# Patient Record
Sex: MALE | Race: BLACK OR AFRICAN AMERICAN | Employment: OTHER | URBAN - METROPOLITAN AREA
[De-identification: names, ages, dates, MRNs, and addresses within clinical notes are randomized per-mention and may not be internally consistent; named-entity substitution may affect disease eponyms.]

---

## 2017-01-22 ENCOUNTER — HOSPITAL ENCOUNTER (EMERGENCY)
Age: 47
Discharge: HOME | End: 2017-01-22
Attending: EMERGENCY MEDICINE
Payer: SELF-PAY

## 2017-01-22 VITALS
HEIGHT: 75 IN | HEART RATE: 73 BPM | WEIGHT: 247.8 LBS | DIASTOLIC BLOOD PRESSURE: 121 MMHG | RESPIRATION RATE: 16 BRPM | SYSTOLIC BLOOD PRESSURE: 154 MMHG | TEMPERATURE: 98.2 F | OXYGEN SATURATION: 99 % | BODY MASS INDEX: 30.81 KG/M2

## 2017-01-22 DIAGNOSIS — M10.9 ACUTE GOUT OF LEFT ELBOW, UNSPECIFIED CAUSE: Primary | ICD-10-CM

## 2017-01-22 DIAGNOSIS — M25.522 LEFT ELBOW PAIN: ICD-10-CM

## 2017-01-22 PROCEDURE — 74011250637 HC RX REV CODE- 250/637: Performed by: PHYSICIAN ASSISTANT

## 2017-01-22 PROCEDURE — 99284 EMERGENCY DEPT VISIT MOD MDM: CPT

## 2017-01-22 PROCEDURE — 74011250636 HC RX REV CODE- 250/636: Performed by: PHYSICIAN ASSISTANT

## 2017-01-22 PROCEDURE — 93005 ELECTROCARDIOGRAM TRACING: CPT

## 2017-01-22 PROCEDURE — 96372 THER/PROPH/DIAG INJ SC/IM: CPT

## 2017-01-22 RX ORDER — COLCHICINE 0.6 MG/1
0.6 TABLET ORAL DAILY
Qty: 10 TAB | Refills: 0 | Status: SHIPPED | OUTPATIENT
Start: 2017-01-22 | End: 2017-02-01

## 2017-01-22 RX ORDER — OXYCODONE AND ACETAMINOPHEN 5; 325 MG/1; MG/1
1-2 TABLET ORAL
Qty: 20 TAB | Refills: 0 | Status: SHIPPED | OUTPATIENT
Start: 2017-01-22 | End: 2017-01-27

## 2017-01-22 RX ORDER — INDOMETHACIN 50 MG/1
50 CAPSULE ORAL 3 TIMES DAILY
Qty: 30 CAP | Refills: 0 | Status: SHIPPED | OUTPATIENT
Start: 2017-01-22 | End: 2017-02-01

## 2017-01-22 RX ORDER — OXYCODONE AND ACETAMINOPHEN 10; 325 MG/1; MG/1
1 TABLET ORAL
Status: COMPLETED | OUTPATIENT
Start: 2017-01-22 | End: 2017-01-22

## 2017-01-22 RX ORDER — COLCHICINE 0.6 MG/1
0.6 TABLET ORAL
Status: COMPLETED | OUTPATIENT
Start: 2017-01-22 | End: 2017-01-22

## 2017-01-22 RX ORDER — INDOMETHACIN 25 MG/1
50 CAPSULE ORAL
Status: COMPLETED | OUTPATIENT
Start: 2017-01-22 | End: 2017-01-22

## 2017-01-22 RX ADMIN — INDOMETHACIN 50 MG: 25 CAPSULE ORAL at 17:54

## 2017-01-22 RX ADMIN — OXYCODONE HYDROCHLORIDE AND ACETAMINOPHEN 1 TABLET: 10; 325 TABLET ORAL at 17:54

## 2017-01-22 RX ADMIN — METHYLPREDNISOLONE SODIUM SUCCINATE 125 MG: 125 INJECTION, POWDER, FOR SOLUTION INTRAMUSCULAR; INTRAVENOUS at 17:54

## 2017-01-22 RX ADMIN — COLCHICINE 0.6 MG: 0.6 TABLET, FILM COATED ORAL at 18:20

## 2017-01-22 NOTE — ED PROVIDER NOTES
HPI Comments: Santhosh Lehman is a 55 y.o. male with pertinent PMHx of HTN and gout presenting ambulatory to the ED c/o 10/10 throbbing left elbow pain, with associated swelling, x this morning. Pt states that the pain radiates up and down his left arm, but denies any radiation to his neck or jaw. Pt states that he has taken BCs with no relief. Pt states that his current symptoms feel like his past history of gout, which was likely flared by increased consumption of alcohol last night. Pt notes that he was recently seen and had blood work done by his PCP, who told him that his \"acid\" and cholesterol were elevated. Pt denies any allergies to medications. Pt specifically denies any fevers/chills, N/V/D, abdominal pain, SOB or chest pain. PCP: SEMAJ Wan 310 Hx: - tobacco use, + alcohol use (beer/daily), - illicit drug use    There are no other complaints, changes, or physical findings at this time. The history is provided by the patient. No  was used. Past Medical History:   Diagnosis Date    Hypertension     Other ill-defined conditions(799.89)      gout       Past Surgical History:   Procedure Laterality Date    Hx orthopaedic       right knee petella tendon repair    Hx heent       eyes         Family History:   Problem Relation Age of Onset    Diabetes Neg Hx        Social History     Social History    Marital status: SINGLE     Spouse name: N/A    Number of children: N/A    Years of education: N/A     Occupational History    Not on file. Social History Main Topics    Smoking status: Never Smoker    Smokeless tobacco: Never Used    Alcohol use 12.0 oz/week     24 Cans of beer per week      Comment: drinks 12 pack a day    Drug use: No    Sexual activity: Not on file     Other Topics Concern    Not on file     Social History Narrative         ALLERGIES: Review of patient's allergies indicates no known allergies.     Review of Systems Constitutional: Negative. Negative for chills and fever. HENT: Negative. Respiratory: Negative. Negative for cough and shortness of breath. Cardiovascular: Negative. Negative for chest pain. Gastrointestinal: Negative. Negative for abdominal pain, diarrhea, nausea and vomiting. Genitourinary: Negative. Musculoskeletal: Positive for arthralgias (left elbow) and joint swelling (left elbow). Skin: Negative. Negative for rash. Neurological: Negative. Psychiatric/Behavioral: Negative. All other systems reviewed and are negative. Vitals:    01/22/17 1702 01/22/17 1833   BP: (!) 170/115 (!) 154/121   Pulse: 75 73   Resp: 18 16   Temp: 98.2 °F (36.8 °C)    SpO2: 100% 99%   Weight: 112.4 kg (247 lb 12.8 oz)    Height: 6' 3\" (1.905 m)             Physical Exam   Constitutional: He is oriented to person, place, and time. He appears well-developed and well-nourished. No distress. 54 yo -American male   HENT:   Head: Normocephalic and atraumatic. Eyes: Conjunctivae are normal. Right eye exhibits no discharge. Left eye exhibits no discharge. Neck: Normal range of motion. Neck supple. Cardiovascular: Normal rate, regular rhythm, normal heart sounds and intact distal pulses. No murmur heard. Pulmonary/Chest: Effort normal and breath sounds normal. No respiratory distress. He has no wheezes. Musculoskeletal:   L ELBOW: + swelling and marked TTP. ROM limited with pain. FROM of the wrist and fingers. Distal NV intact. Cap refill < 2 sec. Ambulatory. Neurological: He is alert and oriented to person, place, and time. Skin: Skin is warm and dry. He is not diaphoretic. Psychiatric: He has a normal mood and affect. His behavior is normal.   Nursing note and vitals reviewed.        MDM  Number of Diagnoses or Management Options  Diagnosis management comments: DDx: fracture, sprain, strain, bursitis, gout       Amount and/or Complexity of Data Reviewed  Tests in the medicine section of CPT®: ordered and reviewed  Review and summarize past medical records: yes  Independent visualization of images, tracings, or specimens: yes    Patient Progress  Patient progress: stable    ED Course       Procedures  5:35 PM    reviewed. Pt has only filled 3 controlled substance Rx in the past 12 months. LABORATORY TESTS:  Recent Results (from the past 12 hour(s))   EKG, 12 LEAD, INITIAL    Collection Time: 01/22/17  5:11 PM   Result Value Ref Range    Ventricular Rate 67 BPM    Atrial Rate 67 BPM    P-R Interval 184 ms    QRS Duration 94 ms    Q-T Interval 400 ms    QTC Calculation (Bezet) 422 ms    Calculated P Axis 0 degrees    Calculated R Axis -4 degrees    Calculated T Axis -15 degrees    Diagnosis       Normal sinus rhythm  Nonspecific T wave abnormality  Abnormal ECG  When compared with ECG of 02-MAR-2016 01:40,  Vent. rate has decreased BY  35 BPM  Nonspecific T wave abnormality now evident in Anterolateral leads  QT has shortened         MEDICATIONS GIVEN:  Medications   methylPREDNISolone (PF) (SOLU-MEDROL) injection 125 mg (125 mg IntraMUSCular Given 1/22/17 1754)   oxyCODONE-acetaminophen (PERCOCET 10)  mg per tablet 1 Tab (1 Tab Oral Given 1/22/17 1754)   indomethacin (INDOCIN) capsule 50 mg (50 mg Oral Given 1/22/17 1754)   colchicine tablet 0.6 mg (0.6 mg Oral Given 1/22/17 1820)       IMPRESSION:  1. Acute gout of left elbow, unspecified cause    2. Left elbow pain    3. Elevated blood pressure        PLAN:  1. Current Discharge Medication List      START taking these medications    Details   indomethacin (INDOCIN) 50 mg capsule Take 1 Cap by mouth three (3) times daily for 10 days. With food  Qty: 30 Cap, Refills: 0      colchicine 0.6 mg tablet Take 1 Tab by mouth daily for 10 days. Qty: 10 Tab, Refills: 0      oxyCODONE-acetaminophen (PERCOCET) 5-325 mg per tablet Take 1-2 Tabs by mouth every six (6) hours as needed for Pain for up to 5 days.  Max Daily Amount: 8 Tabs.  Qty: 20 Tab, Refills: 0         CONTINUE these medications which have NOT CHANGED    Details   lisinopril (PRINIVIL, ZESTRIL) 10 mg tablet Take 1 Tab by mouth daily. Qty: 14 Tab, Refills: 0      atenolol (TENORMIN) 25 mg tablet TAKE ONE TABLET BY MOUTH ONCE DAILY  Qty: 14 Tab, Refills: 5           2. Follow-up Information     Follow up With Details Comments [address] In 1 week  SANCHEZ Mckeon [address] [id number]  [phone]        Return to ED if worse   DISCHARGE NOTE:  6:37 PM  The patient is ready for discharge. The patient's signs, symptoms, diagnosis, and discharge instructions have been discussed and the patient and/or family has conveyed their understanding. The patient and/or family is to follow up as recommended or return to the ER should their symptoms worsen. Plan has been discussed and the patient and/or family is in agreement. Written by Arnold Hayden, ED Scribe, as dictated by HANK [facility]: This note is prepared by Akin Field. Samanta Hayden, acting as Scribe for HANK Valle: The scribe's documentation has been prepared under my direction and personally reviewed by me in its entirety. I confirm that the note above accurately reflects all work, treatment, procedures, and medical decision making performed by me.

## 2017-01-22 NOTE — DISCHARGE INSTRUCTIONS
Gout: Care Instructions  Your Care Instructions  Gout is a form of arthritis caused by a buildup of uric acid crystals in a joint. It causes sudden attacks of pain, swelling, redness, and stiffness, usually in one joint, especially the big toe. Gout usually comes on without a cause. But it can be brought on by drinking alcohol (especially beer) or eating seafood and red meat. Taking certain medicines, such as diuretics or aspirin, also can bring on an attack of gout. Taking your medicines as prescribed and following up with your doctor regularly can help you avoid gout attacks in the future. Follow-up care is a key part of your treatment and safety. Be sure to make and go to all appointments, and call your doctor if you are having problems. Its also a good idea to know your test results and keep a list of the medicines you take. How can you care for yourself at home? · If the joint is swollen, put ice or a cold pack on the area for 10 to 20 minutes at a time. Put a thin cloth between the ice and your skin. · Prop up the sore limb on a pillow when you ice it or anytime you sit or lie down during the next 3 days. Try to keep it above the level of your heart. This will help reduce swelling. · Rest sore joints. Avoid activities that put weight or strain on the joints for a few days. Take short rest breaks from your regular activities during the day. · Take your medicines exactly as prescribed. Call your doctor if you think you are having a problem with your medicine. · Take pain medicines exactly as directed. ¨ If the doctor gave you a prescription medicine for pain, take it as prescribed. ¨ If you are not taking a prescription pain medicine, ask your doctor if you can take an over-the-counter medicine. · Eat less seafood and red meat. · Check with your doctor before drinking alcohol. · Losing weight, if you are overweight, may help reduce attacks of gout. But do not go on a [employer]. \" Losing a lot of weight in a short amount of time can cause a gout attack. When should you call for help? Call your doctor now or seek immediate medical care if:  · You have a fever. · The joint is so painful you cannot use it. · You have sudden, unexplained swelling, redness, warmth, or severe pain in one or more joints. Watch closely for changes in your health, and be sure to contact your doctor if:  · You have joint pain. · Your symptoms get worse or are not improving after 2 or 3 days. Where can you learn more? Go to http://komal-kameron.info/. Enter Q780 in the search box to learn more about \"Gout: Care Instructions. \"  Current as of: February 24, 2016  Content Version: 11.1  © 5995-4707 [employer]. Care instructions adapted under license by [employer] (which disclaims liability or warranty for this information). If you have questions about a medical condition or this instruction, always ask your healthcare professional. [place] [address] any warranty or liability for your use of this information. High Blood Pressure: Care Instructions  Your Care Instructions  If your blood pressure is usually above 140/90, you have high blood pressure, or hypertension. That means the top number is 140 or higher or the bottom number is 90 or higher, or both. Despite what a lot of people think, high blood pressure usually doesn't cause headaches or make you feel dizzy or lightheaded. It usually has no symptoms. But it does increase your risk for heart attack, stroke, and kidney or eye damage. The higher your blood pressure, the more your risk increases. Your doctor will give you a goal for your blood pressure. Your goal will be based on your health and your age. An example of a goal is to keep your blood pressure below 140/90. Lifestyle changes, such as eating healthy and being active, are always important to help lower blood pressure.  You might also take medicine to reach your blood pressure goal.  Follow-up care is a key part of your treatment and safety. Be sure to make and go to all appointments, and call your doctor if you are having problems. It's also a good idea to know your test results and keep a list of the medicines you take. How can you care for yourself at home? Medical treatment  · If you stop taking your medicine, your blood pressure will go back up. You may take one or more types of medicine to lower your blood pressure. Be safe with medicines. Take your medicine exactly as prescribed. Call your doctor if you think you are having a problem with your medicine. · Talk to your doctor before you start taking aspirin every day. Aspirin can help certain people lower their risk of a heart attack or stroke. But taking aspirin isn't right for everyone, because it can cause serious bleeding. · See your doctor regularly. You may need to see the doctor more often at first or until your blood pressure comes down. · If you are taking blood pressure medicine, talk to your doctor before you take decongestants or anti-inflammatory medicine, such as ibuprofen. Some of these medicines can raise blood pressure. · Learn how to check your blood pressure at home. Lifestyle changes  · Stay at a healthy weight. This is especially important if you put on weight around the waist. Losing even 10 pounds can help you lower your blood pressure. · If your doctor recommends it, get more exercise. Walking is a good choice. Bit by bit, increase the amount you walk every day. Try for at least 30 minutes on most days of the week. You also may want to swim, bike, or do other activities. · Avoid or limit alcohol. Talk to your doctor about whether you can drink any alcohol. · Try to limit how much sodium you eat to less than 2,300 milligrams (mg) a day. Your doctor may ask you to try to eat less than 1,500 mg a day.   · Eat plenty of fruits (such as bananas and oranges), vegetables, legumes, whole grains, and low-fat dairy products. · Lower the amount of saturated fat in your diet. Saturated fat is found in animal products such as milk, cheese, and meat. Limiting these foods may help you lose weight and also lower your risk for heart disease. · Do not smoke. Smoking increases your risk for heart attack and stroke. If you need help quitting, talk to your doctor about stop-smoking programs and medicines. These can increase your chances of quitting for good. When should you call for help? Call 911 anytime you think you may need emergency care. This may mean having symptoms that suggest that your blood pressure is causing a serious heart or blood vessel problem. Your blood pressure may be over 180/110. For example, call 911 if:  · You have symptoms of a heart attack. These may include:  ¨ Chest pain or pressure, or a strange feeling in the chest.  ¨ Sweating. ¨ Shortness of breath. ¨ Nausea or vomiting. ¨ Pain, pressure, or a strange feeling in the back, neck, jaw, or upper belly or in one or both shoulders or arms. ¨ Lightheadedness or sudden weakness. ¨ A fast or irregular heartbeat. · You have symptoms of a stroke. These may include:  ¨ Sudden numbness, tingling, weakness, or loss of movement in your face, arm, or leg, especially on only one side of your body. ¨ Sudden vision changes. ¨ Sudden trouble speaking. ¨ Sudden confusion or trouble understanding simple statements. ¨ Sudden problems with walking or balance. ¨ A sudden, severe headache that is different from past headaches. · You have severe back or belly pain. Do not wait until your blood pressure comes down on its own. Get help right away. Call your doctor now or seek immediate care if:  · Your blood pressure is much higher than normal (such as 180/110 or higher), but you don't have symptoms. · You think high blood pressure is causing symptoms, such as:  ¨ Severe headache. ¨ Blurry vision.   Watch closely for changes in your health, and be sure to contact your doctor if:  · Your blood pressure measures 140/90 or higher at least 2 times. That means the top number is 140 or higher or the bottom number is 90 or higher, or both. · You think you may be having side effects from your blood pressure medicine. · Your blood pressure is usually normal, but it goes above normal at least 2 times. Where can you learn more? Go to http://komal-kameron.info/. Enter D964 in the search box to learn more about \"High Blood Pressure: Care Instructions. \"  Current as of: August 8, 2016  Content Version: 11.1  © 3998-2609 [employer]. Care instructions adapted under license by [employer] (which disclaims liability or warranty for this information). If you have questions about a medical condition or this instruction, always ask your healthcare professional. [address] any warranty or liability for your use of this information.

## 2017-01-22 NOTE — ED NOTES
Speedy STRICKLAND has reviewed discharge instructions with the patient. The patient verbalized understanding. Pt discharged with written instructions. No further concerns at this time.

## 2017-01-23 LAB
ATRIAL RATE: 67 BPM
CALCULATED P AXIS, ECG09: 0 DEGREES
CALCULATED R AXIS, ECG10: -4 DEGREES
CALCULATED T AXIS, ECG11: -15 DEGREES
DIAGNOSIS, 93000: NORMAL
P-R INTERVAL, ECG05: 184 MS
Q-T INTERVAL, ECG07: 400 MS
QRS DURATION, ECG06: 94 MS
QTC CALCULATION (BEZET), ECG08: 422 MS
VENTRICULAR RATE, ECG03: 67 BPM

## 2017-04-02 ENCOUNTER — HOSPITAL ENCOUNTER (EMERGENCY)
Age: 47
Discharge: HOME | End: 2017-04-02
Attending: EMERGENCY MEDICINE | Admitting: EMERGENCY MEDICINE
Payer: SELF-PAY

## 2017-04-02 VITALS
OXYGEN SATURATION: 100 % | HEIGHT: 73 IN | WEIGHT: 251.32 LBS | BODY MASS INDEX: 33.31 KG/M2 | RESPIRATION RATE: 22 BRPM | HEART RATE: 82 BPM | SYSTOLIC BLOOD PRESSURE: 174 MMHG | TEMPERATURE: 97.8 F | DIASTOLIC BLOOD PRESSURE: 114 MMHG

## 2017-04-02 DIAGNOSIS — Z76.0 MEDICATION REFILL: ICD-10-CM

## 2017-04-02 DIAGNOSIS — I10 ESSENTIAL HYPERTENSION: ICD-10-CM

## 2017-04-02 DIAGNOSIS — M10.9 ACUTE GOUT OF LEFT ELBOW, UNSPECIFIED CAUSE: Primary | ICD-10-CM

## 2017-04-02 PROCEDURE — 74011250636 HC RX REV CODE- 250/636: Performed by: PHYSICIAN ASSISTANT

## 2017-04-02 PROCEDURE — 99283 EMERGENCY DEPT VISIT LOW MDM: CPT

## 2017-04-02 PROCEDURE — 96372 THER/PROPH/DIAG INJ SC/IM: CPT

## 2017-04-02 PROCEDURE — 74011250637 HC RX REV CODE- 250/637: Performed by: PHYSICIAN ASSISTANT

## 2017-04-02 RX ORDER — ATENOLOL 25 MG/1
25 TABLET ORAL DAILY
COMMUNITY
End: 2017-07-11

## 2017-04-02 RX ORDER — LISINOPRIL 10 MG/1
10 TABLET ORAL DAILY
Qty: 14 TAB | Refills: 0 | Status: SHIPPED | OUTPATIENT
Start: 2017-04-02 | End: 2017-04-16

## 2017-04-02 RX ORDER — INDOMETHACIN 25 MG/1
25 CAPSULE ORAL 3 TIMES DAILY
Qty: 30 CAP | Refills: 0 | Status: SHIPPED | OUTPATIENT
Start: 2017-04-02 | End: 2017-04-12

## 2017-04-02 RX ORDER — METHYLPREDNISOLONE 4 MG/1
TABLET ORAL
Qty: 1 DOSE PACK | Refills: 0 | Status: SHIPPED | OUTPATIENT
Start: 2017-04-02 | End: 2017-05-04

## 2017-04-02 RX ORDER — OXYCODONE AND ACETAMINOPHEN 5; 325 MG/1; MG/1
2 TABLET ORAL
Status: COMPLETED | OUTPATIENT
Start: 2017-04-02 | End: 2017-04-02

## 2017-04-02 RX ORDER — LISINOPRIL 10 MG/1
10 TABLET ORAL DAILY
COMMUNITY
End: 2017-07-11

## 2017-04-02 RX ORDER — ATENOLOL 25 MG/1
25 TABLET ORAL DAILY
Qty: 14 TAB | Refills: 0 | Status: SHIPPED | OUTPATIENT
Start: 2017-04-02 | End: 2017-05-04

## 2017-04-02 RX ORDER — NAPROXEN 250 MG/1
500 TABLET ORAL
Status: COMPLETED | OUTPATIENT
Start: 2017-04-02 | End: 2017-04-02

## 2017-04-02 RX ORDER — OXYCODONE AND ACETAMINOPHEN 5; 325 MG/1; MG/1
1 TABLET ORAL
Qty: 15 TAB | Refills: 0 | Status: SHIPPED | OUTPATIENT
Start: 2017-04-02 | End: 2017-05-04

## 2017-04-02 RX ADMIN — NAPROXEN 500 MG: 250 TABLET ORAL at 23:14

## 2017-04-02 RX ADMIN — METHYLPREDNISOLONE SODIUM SUCCINATE 125 MG: 125 INJECTION, POWDER, FOR SOLUTION INTRAMUSCULAR; INTRAVENOUS at 23:14

## 2017-04-02 RX ADMIN — OXYCODONE HYDROCHLORIDE AND ACETAMINOPHEN 2 TABLET: 5; 325 TABLET ORAL at 23:14

## 2017-04-03 NOTE — DISCHARGE INSTRUCTIONS
Thank you for allowing us to provide you with care today. We hope we addressed all of your concerns and needs. We strive to provide excellent quality care in the Emergency Department. Please rate us as excellent, as anything less than excellent does not meet our expectations. If you feel that you have not received excellent quality care or timely care, please ask to speak to the nurse manager. Please choose us in the future for your continued health care needs. The exam and treatment you received in the Emergency Department were for an urgent problem and are not intended as complete care. It is important that you follow-up with a doctor, nurse practitioner, or  [id number] assistant to: (1) confirm your diagnosis, (2) re-evaluation of changes in your illness and treatment, and (3) for ongoing care. If your symptoms become worse or you do not improve as expected and you are unable to reach your usual health care provider, you should return to the Emergency Department. We are available 24 hours a day. Take this sheet with you when you go to your follow-up visit. If you have any problem arranging the follow-up visit, contact the Emergency Department immediately. Make an appointment with your Primary Care doctor for follow up of this visit. Return to the ER if you are unable to be seen in the time recommended on your discharge instructions.     ==========================    [facility] Departments     For adult and child immunizations, family planning, TB screening, STD testing and women's health services.    [facility]: [place] [phone]      [facility] 25   [address] [place] [address]   170 [facility]: [facility] 200 [facility] [phone]      [facility]          [address] [place] [address]     For primary care services, woman and child wellness, and some clinics providing specialty care. [facility]. 2525 [facility] [phone]   411 [facility] 200 [facility] 3614 [facility] [phone]   339 [facility] [address] [phone]   [address]  [employer] 1604 [facility] 5850  Community  [phone]   [address] [facility] [address] [address] [phone]   [facility] [address] [phone]   [facility] [place] 1051 [facility] [phone]   [facility]: [facility] 700 [address], ext [address] [place] [address], [address] [phone]     [address] [phone]   [facility] Outreach 5850 [facility]  [phone]   [employer]  [address], [address] ([email or web address]) [phone]         Sexual Health/Woman Wellness Clinics    For STD/HIV testing and treatment, pregnancy testing and services, men's health, birth control services, LGBT services, and hepatitis/HPV vaccine services. [employer] for [employer] 201 [address] [facility] 75 [facility] [address] [facility] 1579 600 NENA [address] [phone]   [facility] [address], 5th floor [phone]   Pregnancy [address] [facility] [address]  Patrick Hatch [phone]         [place] [address]67 [facility] 94 [facility]   [phone]   [place]   [phone]   Women, Infant and Children's Services: [address] [phone]       [address] [phone]   [facility] Crisis Intervention   [phone]   4801 \[facility]\""   [phone] 6125 [facility] Psychiatry     [phone]   [address] Crisis   [address] [facility] [address] [phone]     [facility] Primary [facility]  [facility] Family [facility]icians [phone]  MD Belen Álvarez MD Araceli Hanson, MD [facility] Doctors [phone]  [address], [facility]  MD Antony Lomeli MD Alinda Roux, MD [address] [facility] [phone]  [place] [address] [facility] [phone]  Divine Blanc, MD Rivas Valenzuela, MD Meena Llamas MD   [facility] [phone]  [facility] MZIDVE TK, MD Davonte Rasheed, MD Joyce Mcintosh, NP [address] [phone]  MD Surendra Claros, MD Vanesa Riojas, MD Pranav Camacho, MD Mark Ruiz, MD Lore Nino, MD Buddy Jewell MD   33 57 [facility]  Betty Johnston MD [facility] [phone]  Sienna Darnell, MD Domingo Wakefield, NP  Bonnie Lui, MD Tyson Davenport, MD Sheron Valadez, MD Lilian West, MD Sue Vega MD   8470 [facility] [phone]  MD Mackenzie Mercado, P  Royce Rubio, NP  Holli Trevizo, MD Emily Lama, MD Madeline Galo, MD Marva Archer MD [facility] [phone]  MD Apolinar Coronado MD Alecia Mylar, MD Jon Coke, MD Ana Jeronimo MD   [address] [phone]  MD Nii Benitez MD [place] [phone]  MD Dylan Lin MD Jacquelynn Matters, MD   [facility] Physicians [phone]  MD Angie Chavarria, MD Flory Ramirez, MD Lein Brown, MD Fabiola Nolan, NP  Oc Estes MD [address] [place] [address]   [phone]  Samanta Frost, MD Madeleine Phillip, MD Adelia Prsaad MD   2102 [facility] [phone]  Nidia Kwon, MD Misty Bender, KATHRYN Reyes, HANK Reyes, KATHRYN Yun, HANK Diaz, MD Simon Dickson, ROB Sifuentes, DO Miscellaneous:  Brett Pro [phone]

## 2017-04-03 NOTE — ED PROVIDER NOTES
HPI Comments: Aaron Murillo is a 55 y.o. male with significant PMHx of gout and hypertension, presents ambulatory  to the ED with c/o acute onset of worsening constant left elbow pain x this morning. Pt reports he has \"gout in my elbow\" and he normally goes to an ER when it flares up. He states that is in the process of getting a primary care and has an appointment scheduled for 4/30. Pt reports he is normally given a shot of prednisone, percocet, and indomethacin with relief. Pt requested for 2 weeks worth of blood pressure medication. Pt denies injury, currently having a PCP, nausea, vomiting, fever, or chills. PCP: None  Social Hx: -tobacco, + EtOH (14.4 oz per week)    There are no other complaints, changes or physical findings at this time. Written by Jose Antonio Magaña ED Scribe, as dictated by Elian Simental      The history is provided by the patient. Past Medical History:   Diagnosis Date    Gout     Hypertension        History reviewed. No pertinent surgical history. History reviewed. No pertinent family history. Social History     Social History    Marital status: SINGLE     Spouse name: N/A    Number of children: N/A    Years of education: N/A     Occupational History    Not on file. Social History Main Topics    Smoking status: Never Smoker    Smokeless tobacco: Not on file    Alcohol use 14.4 oz/week     24 Cans of beer per week    Drug use: No    Sexual activity: Not on file     Other Topics Concern    Not on file     Social History Narrative    No narrative on file         ALLERGIES: Review of patient's allergies indicates no known allergies. Review of Systems   Constitutional: Negative for chills, fatigue and fever. HENT: Negative for congestion, ear pain and rhinorrhea. Eyes: Negative for pain and redness. Respiratory: Negative for cough and wheezing. Cardiovascular: Negative for chest pain and palpitations.    Gastrointestinal: Negative for abdominal pain, nausea and vomiting. Genitourinary: Negative for dysuria, frequency and urgency. Musculoskeletal: Positive for arthralgias (left elbow). Negative for back pain, neck pain and neck stiffness. Skin: Negative for rash and wound. Neurological: Negative for weakness, light-headedness, numbness and headaches. All other systems reviewed and are negative. Vitals:    04/02/17 2205   BP: (!) 174/114   Pulse: 82   Resp: 22   Temp: 97.8 °F (36.6 °C)   SpO2: 100%   Weight: 114 kg (251 lb 5.2 oz)   Height: 6' 1\" (1.854 m)            Physical Exam   Constitutional: He is oriented to person, place, and time. He appears well-developed and well-nourished. Non-toxic appearance. No distress. HENT:   Head: Normocephalic and atraumatic. Head is without right periorbital erythema and without left periorbital erythema. Right Ear: External ear normal.   Left Ear: External ear normal.   Nose: Nose normal.   Mouth/Throat: Uvula is midline. No trismus in the jaw. Eyes: Conjunctivae and EOM are normal. Pupils are equal, round, and reactive to light. No scleral icterus. Neck: Normal range of motion and full passive range of motion without pain. Cardiovascular: Normal rate, regular rhythm and normal heart sounds. Pulmonary/Chest: Effort normal and breath sounds normal. No accessory muscle usage. No tachypnea. No respiratory distress. He has no decreased breath sounds. He has no wheezes. Abdominal: Soft. There is no tenderness. There is no rigidity and no guarding. Musculoskeletal: Normal range of motion. Left elbow: He exhibits swelling. LEFT ELBOW:  Mild swelling of olecranon, no break in skin or redness. Full active ROM with discomfort. Neurological: He is alert and oriented to person, place, and time. He is not disoriented. No cranial nerve deficit or sensory deficit. GCS eye subscore is 4. GCS verbal subscore is 5. GCS motor subscore is 6. Skin: Skin is intact. No rash noted. Psychiatric: He has a normal mood and affect. His speech is normal.   Nursing note and vitals reviewed. MDM  Number of Diagnoses or Management Options  Acute gout of left elbow, unspecified cause:   Essential hypertension:   Medication refill:   Diagnosis management comments:     DDx: gout flare, hypertension, medication refill     Patient Progress  Patient progress: stable    ED Course       Procedures      MEDICATIONS GIVEN:  Medications   methylPREDNISolone (PF) (SOLU-MEDROL) injection 125 mg (125 mg IntraMUSCular Given 4/2/17 2314)   oxyCODONE-acetaminophen (PERCOCET) 5-325 mg per tablet 2 Tab (2 Tabs Oral Given 4/2/17 2314)   naproxen (NAPROSYN) tablet 500 mg (500 mg Oral Given 4/2/17 2314)       IMPRESSION:  1. Acute gout of left elbow, unspecified cause    2. Essential hypertension    3. Medication refill        PLAN:  1. Discharge Medication List as of 4/2/2017 10:52 PM      START taking these medications    Details   !! atenolol (TENORMIN) 25 mg tablet Take 1 Tab by mouth daily. , Print, Disp-14 Tab, R-0      !! lisinopril (PRINIVIL) 10 mg tablet Take 1 Tab by mouth daily for 14 days. , Print, Disp-14 Tab, R-0      oxyCODONE-acetaminophen (PERCOCET) 5-325 mg per tablet Take 1 Tab by mouth every four (4) hours as needed for Pain. Max Daily Amount: 6 Tabs., Print, Disp-15 Tab, R-0      indomethacin (INDOCIN) 25 mg capsule Take 1 Cap by mouth three (3) times daily for 10 days. With food, Print, Disp-30 Cap, R-0      methylPREDNISolone (MEDROL, KANG,) 4 mg tablet Per Dose Pack instructions, Print, Disp-1 Dose Pack, R-0       !! - Potential duplicate medications found. Please discuss with provider. CONTINUE these medications which have NOT CHANGED    Details   !! atenolol (TENORMIN) 25 mg tablet Take 25 mg by mouth daily. , Historical Med      !! lisinopril (PRINIVIL, ZESTRIL) 10 mg tablet Take 10 mg by mouth daily. , Historical Med       !! - Potential duplicate medications found.  Please discuss with provider. 2.   Follow-up Information     Follow up With Details Comments [address] Schedule an appointment as soon as possible for a visit PRIMARY CARE: call tomorrow to schedule follow up [address] [id number] [phone]        Return to ED if worse     DISCHARGE NOTE  11:10 PM  The patient has been re-evaluated and is ready for discharge. Reviewed available results with patient. Counseled pt on diagnosis and care plan. Pt has expressed understanding, and all questions have been answered. Pt agrees with plan and agrees to F/U as recommended, or return to the ED if their sxs worsen. Discharge instructions have been provided and explained to the pt, along with reasons to return to the ED. Written by Cliff Henry, ED Scribe, as dictated by Nikhil Samuel. This note is prepared by Cliff Henry, acting as Scribe for Nikhil Samuel. HANK Love : The scribe's documentation has been prepared under my direction and personally reviewed by me in its entirety. I confirm that the note above accurately reflects all work, treatment, procedures, and medical decision making performed by me.

## 2017-04-03 NOTE — ED NOTES
Discharge instructions reviewed with pt and copy given along with RX by PERNELL Gutierrez. Patient ambulatory from ED accompanied by spouse in no sign of distress or discomfort.

## 2017-04-03 NOTE — ED NOTES
Patient reports Gabby Vogel is out of his atenolol 25 mg and lisinopril 10 mg prescription as well and needs a refill on his prescription because he has not taken his medication in about a week. \"

## 2017-04-03 NOTE — ED NOTES
Patient states \"I got the gout in my left elbow and I normally get a prednisone shot and a percocet for the pain and another pill that helps the pain. \" Patient reports left elbow pain started around noon today and is a 10/10. Patient denies all other complaints at this time.

## 2017-05-04 ENCOUNTER — APPOINTMENT (OUTPATIENT)
Dept: GENERAL RADIOLOGY | Age: 47
End: 2017-05-04
Attending: PHYSICIAN ASSISTANT
Payer: SELF-PAY

## 2017-05-04 ENCOUNTER — HOSPITAL ENCOUNTER (EMERGENCY)
Age: 47
Discharge: HOME | End: 2017-05-04
Attending: EMERGENCY MEDICINE
Payer: SELF-PAY

## 2017-05-04 VITALS
OXYGEN SATURATION: 99 % | TEMPERATURE: 97.7 F | SYSTOLIC BLOOD PRESSURE: 189 MMHG | HEIGHT: 75 IN | BODY MASS INDEX: 31.36 KG/M2 | DIASTOLIC BLOOD PRESSURE: 107 MMHG | WEIGHT: 252.21 LBS | RESPIRATION RATE: 18 BRPM | HEART RATE: 79 BPM

## 2017-05-04 DIAGNOSIS — I10 ESSENTIAL HYPERTENSION: ICD-10-CM

## 2017-05-04 DIAGNOSIS — M25.561 ACUTE PAIN OF RIGHT KNEE: Primary | ICD-10-CM

## 2017-05-04 PROCEDURE — 73562 X-RAY EXAM OF KNEE 3: CPT

## 2017-05-04 PROCEDURE — 99284 EMERGENCY DEPT VISIT MOD MDM: CPT

## 2017-05-04 PROCEDURE — L1830 KO IMMOB CANVAS LONG PRE OTS: HCPCS

## 2017-05-04 PROCEDURE — 74011250637 HC RX REV CODE- 250/637: Performed by: PHYSICIAN ASSISTANT

## 2017-05-04 RX ORDER — NAPROXEN 250 MG/1
500 TABLET ORAL
Status: COMPLETED | OUTPATIENT
Start: 2017-05-04 | End: 2017-05-04

## 2017-05-04 RX ORDER — OXYCODONE AND ACETAMINOPHEN 5; 325 MG/1; MG/1
1 TABLET ORAL
Qty: 10 TAB | Refills: 0 | Status: SHIPPED | OUTPATIENT
Start: 2017-05-04 | End: 2017-05-07

## 2017-05-04 RX ORDER — ATENOLOL 25 MG/1
25 TABLET ORAL DAILY
Qty: 60 TAB | Refills: 0 | Status: SHIPPED | OUTPATIENT
Start: 2017-05-04 | End: 2017-07-03

## 2017-05-04 RX ORDER — ACETAMINOPHEN 500 MG
1000 TABLET ORAL
Status: COMPLETED | OUTPATIENT
Start: 2017-05-04 | End: 2017-05-04

## 2017-05-04 RX ORDER — LISINOPRIL 10 MG/1
10 TABLET ORAL DAILY
Qty: 60 TAB | Refills: 0 | Status: SHIPPED | OUTPATIENT
Start: 2017-05-04 | End: 2017-07-03

## 2017-05-04 RX ORDER — NAPROXEN 500 MG/1
500 TABLET ORAL 2 TIMES DAILY WITH MEALS
Qty: 20 TAB | Refills: 0 | Status: SHIPPED | OUTPATIENT
Start: 2017-05-04 | End: 2017-05-14

## 2017-05-04 RX ORDER — LISINOPRIL 5 MG/1
10 TABLET ORAL
Status: COMPLETED | OUTPATIENT
Start: 2017-05-04 | End: 2017-05-04

## 2017-05-04 RX ORDER — ATENOLOL 25 MG/1
25 TABLET ORAL
Status: COMPLETED | OUTPATIENT
Start: 2017-05-04 | End: 2017-05-04

## 2017-05-04 RX ADMIN — ATENOLOL 25 MG: 25 TABLET ORAL at 11:37

## 2017-05-04 RX ADMIN — LISINOPRIL 10 MG: 5 TABLET ORAL at 11:24

## 2017-05-04 RX ADMIN — ACETAMINOPHEN 1000 MG: 500 TABLET ORAL at 10:32

## 2017-05-04 RX ADMIN — NAPROXEN 500 MG: 250 TABLET ORAL at 10:32

## 2017-05-04 NOTE — DISCHARGE INSTRUCTIONS
Thank you for allowing us to provide you with excellent care today. We hope we addressed all of your concerns and needs. We strive to provide excellent quality care in the Emergency Department. Please rate us as excellent, as anything less than excellent does not meet our expectations. If you feel that you have not received excellent quality care or timely care, please ask to speak to the nurse manager. Please choose us in the future for your continued health care needs. The exam and treatment you received in the Emergency Department were for an urgent problem and are not intended as complete care. It is important that you follow-up with a doctor, nurse practitioner, or physician assistant to:  (1) confirm your diagnosis,  (2) re-evaluation of changes in your illness and treatment, and  (3) for ongoing care. If your symptoms become worse or you do not improve as expected and you are unable to reach your usual health care provider, you should return to the Emergency Department. We are available 24 hours a day. Take this sheet with you when you go to your follow-up visit. If you have any problem arranging the follow-up visit, contact [address] [place] [address] 21 [phone])    Make an appointment with your Primary Care doctor for follow up of this visit. Return to the ER if you are unable to be seen in the time recommended on your discharge instructions. Joint Pain: Care Instructions  Your Care Instructions  Many people have small aches and pains from overuse or injury to muscles and joints. Joint injuries often happen during sports or recreation, work tasks, or projects around the home. An overuse injury can happen when you put too much stress on a joint or when you do an activity that stresses the joint over and over, such as using the computer or rowing a boat. You can take action at home to help your muscles and joints get better.  You should feel better in 1 to 2 weeks, but it can take 3 months or more to heal completely. Follow-up care is a key part of your treatment and safety. Be sure to make and go to all appointments, and call your doctor if you are having problems. It's also a good idea to know your test results and keep a list of the medicines you take. How can you care for yourself at home? · Do not put weight on the injured joint for at least a day or two. · For the first day or two after an injury, do not take hot showers or baths, and do not use hot packs. The heat could make swelling worse. · Put ice or a cold pack on the sore joint for 10 to 20 minutes at a time. Try to do this every 1 to 2 hours for the next 3 days (when you are awake) or until the swelling goes down. Put a thin cloth between the ice and your skin. · Wrap the injury in an elastic bandage. Do not wrap it too tightly because this can cause more swelling. · Prop up the sore joint on a pillow when you ice it or anytime you sit or lie down during the next 3 days. Try to keep it above the level of your heart. This will help reduce swelling. · Take an over-the-counter pain medicine, such as acetaminophen (Tylenol), ibuprofen (Advil, Motrin), or naproxen (Aleve). Read and follow all instructions on the label. · After 1 or 2 days of rest, begin moving the joint gently. While the joint is still healing, you can begin to exercise using activities that do not strain or hurt the painful joint. When should you call for help? Call your doctor now or seek immediate medical care if:  · You have signs of infection, such as:  ¨ Increased pain, swelling, warmth, and redness. ¨ Red streaks leading from the joint. ¨ A fever. Watch closely for changes in your health, and be sure to contact your doctor if:  · Your movement or symptoms are not getting better after 1 to 2 weeks of home treatment. Where can you learn more? Go to http://komal-kameron.info/.   Enter P205 in the search box to learn more about \"Joint Pain: Care Instructions. \"  Current as of: May 23, 2016  Content Version: 11.2  © 0727-3352 [employer], [facility]. Care instructions adapted under license by [employer] (which disclaims liability or warranty for this information). If you have questions about a medical condition or this instruction, always ask your healthcare professional. [place] [address] any warranty or liability for your use of this information.

## 2017-05-04 NOTE — LETTER
[address] 
\[facility]\"" EMERGENCY DEPT 
[address] [place] [address] [address] [id number] 
[phone] Work/School Note Date: 5/4/2017 To Whom It May concern: 
 
Sunny Josue was seen and treated today in the emergency room by the following provider(s): 
Attending Provider: Samia Tee. MD Brenda 
Physician Assistant: Jacqueline Singer PA-C. Sunny Josue may return to work on 7 May 2017. Sincerely, Jacqueline Singer PA-C

## 2017-05-04 NOTE — ED NOTES
Patient presents ambulatory to the ED with complaint of right knee swelling and pain after missing a step coming out of his truck yesterday. Patient denies falling or any other complaints. Patient reports surgery on right knee and injuring it in the past the same way about two years ago.

## 2017-05-04 NOTE — ED PROVIDER NOTES
The history is provided by the patient. No  was used. Maribel Harkins is a 55 y.o. 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 Patient did not fall, but rather he missed the bottom step and his right foot hit pavement hard causing a \"jamming\" type mechanism to right knee. Patient is able to bend right knee, but painfully. Patient continued to work the remainder of his shift and being in truck all day, getting into and out of truck frequently remainder of the day. As day progressed, the \"tightness\" in right knee worsened. Patient denies CP, SOB, h/a, left lower leg/calf pain or other specific c/o or concerns today. Patient took BC 0600 today without improvement. Patient with hx similar injury with same mechanism x 2 years ago; patient with hx right knee surgery/patellar tendon repair 1999 and again in 2004 due to hardware failure; both surgeries by Dr Tai Brock at [facility]. No crutches or knee immobilizer at home to use. PCP: None  Allergies: none  Social Hx: never tobacco, yes alcohol    There are no other complaints, changes or physical findings at this time. Past Medical History:   Diagnosis Date    Gout     Hypertension     Other ill-defined conditions     gout       Past Surgical History:   Procedure Laterality Date    HX HEENT      eyes    HX ORTHOPAEDIC      right knee petella tendon repair         Family History:   Problem Relation Age of Onset    Diabetes Neg Hx        Social History     Social History    Marital status: SINGLE     Spouse name: N/A    Number of children: N/A    Years of education: N/A     Occupational History    Not on file.      Social History Main Topics    Smoking status: Never Smoker    Smokeless tobacco: Never Used    Alcohol use 14.4 oz/week     24 Cans of beer per week      Comment: 12 a week    Drug use: No    Sexual activity: Not on file     Other Topics Concern    Not on file     Social History Narrative    ** Merged History Encounter **              ALLERGIES: Review of patient's allergies indicates no known allergies. Review of Systems   Constitutional: Negative for chills, diaphoresis and fever. HENT: Negative for rhinorrhea and sore throat. Eyes: Negative for pain. Respiratory: Negative for shortness of breath, wheezing and stridor. Cardiovascular: Negative for chest pain and leg swelling. Gastrointestinal: Negative for abdominal pain, diarrhea, nausea and vomiting. Genitourinary: Negative for difficulty urinating, dysuria, flank pain and hematuria. Musculoskeletal: Positive for arthralgias (right knee). Negative for back pain and neck stiffness. Right knee pain   Skin: Negative for rash and wound. Neurological: Negative for dizziness, seizures, syncope, weakness, light-headedness and headaches. Psychiatric/Behavioral: Negative for behavioral problems and confusion. All other systems reviewed and are negative. Vitals:    05/04/17 1004 05/04/17 1118   BP: (!) 170/103 (!) 189/107   Pulse: 79    Resp: 18    Temp: 97.7 °F (36.5 °C)    SpO2: 100% 99%   Weight: 114.4 kg (252 lb 3.3 oz)    Height: 6' 3\" (1.905 m)             Physical Exam   Constitutional: He is oriented to person, place, and time. He appears well-developed and well-nourished. No distress. 56 y/o AAM in no acute distress   HENT:   Head: Normocephalic and atraumatic. Right Ear: External ear normal.   Left Ear: External ear normal.   Nose: Nose normal.   Eyes: Conjunctivae and EOM are normal.   Neck: Normal range of motion. Neck supple. Cardiovascular: Normal rate. No murmur heard. Pulmonary/Chest: Effort normal.   Abdominal: Soft. Bowel sounds are normal. He exhibits no distension. There is no tenderness. There is no guarding. Musculoskeletal:        Right knee: He exhibits swelling and effusion.  He exhibits normal range of motion. Right knee: No erythema or bruising. Well healed midline surgical scar. Nontender to quadriceps or patellar tendon. Tenderness and edema to the medial knee. No patellar facet tenderness. No laxity to cruiciate or collateral ligaments. FROM of knee. NVI distally. Left knee: No erythema, edema, or bruising. Nontender to quadriceps tendon Nontender to patellar tendon or tibial tuberosity. No joint line tenderness laterally or medially. No patellar facet tenderness. No laxity to cruiciate or collateral ligaments. FROM and NVI distally   Neurological: He is alert and oriented to person, place, and time. Skin: Skin is warm and dry. No rash noted. He is not diaphoretic. Psychiatric: He has a normal mood and affect. His behavior is normal. Judgment normal.   Nursing note and vitals reviewed. MDM  Number of Diagnoses or Management Options  Acute pain of right knee:   Essential hypertension:   Diagnosis management comments: DDx: sprain, strain, contusion, joint effusion, fracture, hardware failure    Patient presents with knee pain since injury yesterday. Will get analgesics and xray to further evaluate. Amount and/or Complexity of Data Reviewed  Tests in the radiology section of CPT®: ordered and reviewed    Patient Progress  Patient progress: stable    ED Course       Procedures     Chief Complaint   Patient presents with    Knee Pain     Right knee pain after injured yesterday. 10:10 AM  The patients presenting problems have been discussed, and they are in agreement with the care plan formulated and outlined with them. I have encouraged them to ask questions as they arise throughout their visit.     MEDICATIONS GIVEN:  Medications   acetaminophen (TYLENOL) tablet 1,000 mg (1,000 mg Oral Given 5/4/17 1032)   naproxen (NAPROSYN) tablet 500 mg (500 mg Oral Given 5/4/17 1032)   lisinopril (PRINIVIL, ZESTRIL) tablet 10 mg (10 mg Oral Given 5/4/17 1124)   atenolol (TENORMIN) tablet 25 mg (25 mg Oral Given 5/4/17 1137)       LABS REVIEWED:  No results found for this or any previous visit (from the past 24 hour(s)). VITAL SIGNS:  Patient Vitals for the past 12 hrs:   Temp Pulse Resp BP SpO2   05/04/17 1118 - - - (!) 189/107 99 %   05/04/17 1004 97.7 °F (36.5 °C) 79 18 (!) 170/103 100 %       RADIOLOGY RESULTS:  The following have been ordered and reviewed:  XR KNEE RT 3 V   Final Result   INDICATION: pain/swelling     Exam: AP, lateral, oblique views of the right knee.     FINDINGS: There is no acute fracture-dislocation. There are small osteophytes  along the superior and inferior margins of the patella. Bones are  well-mineralized. Soft tissues are normal.     IMPRESSION  IMPRESSION: No acute fracture or dislocation. PROCEDURES:  Procedure Note - Splint Assessment: right knee immobilizer application  16:95 AM  Applied by ER nursing staff  Eval by EM Jacinto  Knee immobilizer applied to patient's right knee for stabilization/swelling control was evaluated by EM Jacinto. Immobilizer in good position. N/V intact after treatment. The procedure took 1-15  minutes, and patient tolerated well    PROGRESS NOTES:  10:10 AM  Initial assessment of patient complete, and patient was given the opportunity to ask questions. Plan of care reviewed with patient and will update when results are available. 11:04 AM  I have just reevaluated the patient. I have reviewed his vital signs and determined there is currently no worsening in their condition or physical exam. Results have been reviewed with them and their questions have been answered. Discussed with patient use of knee immobilizer and crutches. Patient also requesting note for work. Patient states that he has been out of his BP medications for the past week and is requesting refill of his BP medications until his appointment with his PCP on 6/16. HYPERTENSION COUNSELING  Patient denies chest pain, headache, shortness of breath.  Patient is made aware of their elevated blood pressure and is instructed to follow up this week with their Primary Care for a recheck. Patient is counseled regarding consequences of chronic, uncontrolled hypertension including kidney disease, heart disease, stroke or even death. Patient states their understanding and agrees to follow up this week. 11:37 AM  I have reviewed discharge instructions with the patient and explained medications that he is being discharged with. The patient verbalized understanding and agrees with plan. DIAGNOSIS:    1. Acute pain of right knee    2. Essential hypertension        PLAN:  1. Right knee immobilizer applied appropriately with crutches  2. Medications as directed  3. F/u with Orthopedic surgeon in 2-3 days  4. Return precautions reviewed  5. Discharge home    Follow-up Information     Follow up With Details Comments Contact Info    Your orthopedic surgeon Schedule an appointment as soon as possible for a visit      [facility] EMERGENCY DEPT  As needed, If symptoms worsen [address] [place] [address]  [phone]        Current Discharge Medication List      START taking these medications    Details   !! lisinopril (PRINIVIL) 10 mg tablet Take 1 Tab by mouth daily for 60 days. Qty: 60 Tab, Refills: 0      !! atenolol (TENORMIN) 25 mg tablet Take 1 Tab by mouth daily for 60 days. Qty: 60 Tab, Refills: 0      naproxen (NAPROSYN) 500 mg tablet Take 1 Tab by mouth two (2) times daily (with meals) for 10 days. Qty: 20 Tab, Refills: 0      oxyCODONE-acetaminophen (PERCOCET) 5-325 mg per tablet Take 1 Tab by mouth every six (6) hours as needed for Pain for up to 3 days. Max Daily Amount: 4 Tabs. Qty: 10 Tab, Refills: 0       !! - Potential duplicate medications found. Please discuss with provider. CONTINUE these medications which have NOT CHANGED    Details   !! atenolol (TENORMIN) 25 mg tablet Take 25 mg by mouth daily.       !! lisinopril (PRINIVIL, ZESTRIL) 10 mg tablet Take 10 mg by mouth daily. !! - Potential duplicate medications found. Please discuss with provider. ED COURSE: The patients hospital course has been uncomplicated. DISCHARGE NOTE:  11:36 AM  The care plan has been outline with the patient and/or family, who verbally conveyed understanding and agreement. Available results have been reviewed. Patient and/or family understand the follow up plan as outlined and discharge instructions. Should their condition deterioration at any time after discharge the patient agrees to return, follow up sooner than outlined or seek medical assistance at the closest Emergency Room as soon as possible. Questions have been answered. Discharge instructions and educational information regarding the patient's diagnosis as well a list of reasons why the patient would want to seek immediate medical attention, should their condition change, were reviewed directly with the patient/family     This note is prepared by Alton Reese, acting as Scribe for HANK Izquierdo PA-C: The scribe's documentation has been prepared under my direction and personally reviewed by me in its entirety. I confirm that the note above accurately reflects all work, treatment, procedures, and medical decision making performed by me.

## 2017-05-04 NOTE — ED NOTES
Shaquille Morrison reviewed discharge instructions with the patient. The patient verbalized understanding. EM Morrison at bedside to provide discharge paperwork. Vital signs stable. Pt in no apparent distress at this time. Mental status at baseline. Ambulatory to waiting room with steady gate, discharge paperwork in hand. Accompanied by self.

## 2017-05-04 NOTE — ED NOTES
Knee immobilizer applied to patient's right knee. Crutches fit to patient height. Patient comfortable. Patient educated on knee immobilizer and crutches. Patient verbalizes understanding.

## 2017-05-12 ENCOUNTER — TELEPHONE (OUTPATIENT)
Dept: FAMILY MEDICINE CLINIC | Age: 47
End: 2017-05-12

## 2017-06-02 ENCOUNTER — HOSPITAL ENCOUNTER (EMERGENCY)
Age: 47
Discharge: HOME | End: 2017-06-02
Attending: EMERGENCY MEDICINE
Payer: SELF-PAY

## 2017-06-02 ENCOUNTER — APPOINTMENT (OUTPATIENT)
Dept: GENERAL RADIOLOGY | Age: 47
End: 2017-06-02
Attending: EMERGENCY MEDICINE
Payer: SELF-PAY

## 2017-06-02 VITALS
HEART RATE: 91 BPM | OXYGEN SATURATION: 97 % | HEIGHT: 75 IN | BODY MASS INDEX: 31.85 KG/M2 | WEIGHT: 256.17 LBS | DIASTOLIC BLOOD PRESSURE: 94 MMHG | TEMPERATURE: 98.2 F | RESPIRATION RATE: 18 BRPM | SYSTOLIC BLOOD PRESSURE: 161 MMHG

## 2017-06-02 DIAGNOSIS — J06.9 ACUTE UPPER RESPIRATORY INFECTION: ICD-10-CM

## 2017-06-02 DIAGNOSIS — R05.9 COUGH: Primary | ICD-10-CM

## 2017-06-02 DIAGNOSIS — F14.91 HISTORY OF COCAINE USE: ICD-10-CM

## 2017-06-02 DIAGNOSIS — J01.00 ACUTE MAXILLARY SINUSITIS, RECURRENCE NOT SPECIFIED: ICD-10-CM

## 2017-06-02 LAB
ALBUMIN SERPL BCP-MCNC: 3.8 G/DL (ref 3.5–5)
ALBUMIN/GLOB SERPL: 1.1 {RATIO} (ref 1.1–2.2)
ALP SERPL-CCNC: 55 U/L (ref 45–117)
ALT SERPL-CCNC: 47 U/L (ref 12–78)
ANION GAP BLD CALC-SCNC: 8 MMOL/L (ref 5–15)
AST SERPL W P-5'-P-CCNC: 47 U/L (ref 15–37)
BASOPHILS # BLD AUTO: 0 K/UL (ref 0–0.1)
BASOPHILS # BLD: 1 % (ref 0–1)
BILIRUB SERPL-MCNC: 0.5 MG/DL (ref 0.2–1)
BUN SERPL-MCNC: 11 MG/DL (ref 6–20)
BUN/CREAT SERPL: 8 (ref 12–20)
CALCIUM SERPL-MCNC: 8.7 MG/DL (ref 8.5–10.1)
CHLORIDE SERPL-SCNC: 110 MMOL/L (ref 97–108)
CO2 SERPL-SCNC: 24 MMOL/L (ref 21–32)
CREAT SERPL-MCNC: 1.31 MG/DL (ref 0.7–1.3)
EOSINOPHIL # BLD: 0.3 K/UL (ref 0–0.4)
EOSINOPHIL NFR BLD: 6 % (ref 0–7)
ERYTHROCYTE [DISTWIDTH] IN BLOOD BY AUTOMATED COUNT: 14.3 % (ref 11.5–14.5)
GLOBULIN SER CALC-MCNC: 3.6 G/DL (ref 2–4)
GLUCOSE SERPL-MCNC: 109 MG/DL (ref 65–100)
HCT VFR BLD AUTO: 37.6 % (ref 36.6–50.3)
HGB BLD-MCNC: 12.1 G/DL (ref 12.1–17)
LACTATE SERPL-SCNC: 1.3 MMOL/L (ref 0.4–2)
LYMPHOCYTES # BLD AUTO: 20 % (ref 12–49)
LYMPHOCYTES # BLD: 1.1 K/UL (ref 0.8–3.5)
MCH RBC QN AUTO: 28.7 PG (ref 26–34)
MCHC RBC AUTO-ENTMCNC: 32.2 G/DL (ref 30–36.5)
MCV RBC AUTO: 89.1 FL (ref 80–99)
MONOCYTES # BLD: 0.5 K/UL (ref 0–1)
MONOCYTES NFR BLD AUTO: 9 % (ref 5–13)
NEUTS SEG # BLD: 3.7 K/UL (ref 1.8–8)
NEUTS SEG NFR BLD AUTO: 64 % (ref 32–75)
PLATELET # BLD AUTO: 140 K/UL (ref 150–400)
POTASSIUM SERPL-SCNC: 3.6 MMOL/L (ref 3.5–5.1)
PROT SERPL-MCNC: 7.4 G/DL (ref 6.4–8.2)
RBC # BLD AUTO: 4.22 M/UL (ref 4.1–5.7)
SODIUM SERPL-SCNC: 142 MMOL/L (ref 136–145)
WBC # BLD AUTO: 5.6 K/UL (ref 4.1–11.1)

## 2017-06-02 PROCEDURE — 85025 COMPLETE CBC W/AUTO DIFF WBC: CPT | Performed by: EMERGENCY MEDICINE

## 2017-06-02 PROCEDURE — 36415 COLL VENOUS BLD VENIPUNCTURE: CPT | Performed by: EMERGENCY MEDICINE

## 2017-06-02 PROCEDURE — 99283 EMERGENCY DEPT VISIT LOW MDM: CPT

## 2017-06-02 PROCEDURE — 74011000250 HC RX REV CODE- 250: Performed by: EMERGENCY MEDICINE

## 2017-06-02 PROCEDURE — 77030029684 HC NEB SM VOL KT MONA -A

## 2017-06-02 PROCEDURE — 71020 XR CHEST PA LAT: CPT

## 2017-06-02 PROCEDURE — 74011250637 HC RX REV CODE- 250/637: Performed by: EMERGENCY MEDICINE

## 2017-06-02 PROCEDURE — 74011250636 HC RX REV CODE- 250/636: Performed by: EMERGENCY MEDICINE

## 2017-06-02 PROCEDURE — 83605 ASSAY OF LACTIC ACID: CPT | Performed by: EMERGENCY MEDICINE

## 2017-06-02 PROCEDURE — 94640 AIRWAY INHALATION TREATMENT: CPT

## 2017-06-02 PROCEDURE — 96372 THER/PROPH/DIAG INJ SC/IM: CPT

## 2017-06-02 PROCEDURE — 80053 COMPREHEN METABOLIC PANEL: CPT | Performed by: EMERGENCY MEDICINE

## 2017-06-02 RX ORDER — CODEINE PHOSPHATE AND GUAIFENESIN 10; 100 MG/5ML; MG/5ML
10 SOLUTION ORAL
Status: COMPLETED | OUTPATIENT
Start: 2017-06-02 | End: 2017-06-02

## 2017-06-02 RX ORDER — IPRATROPIUM BROMIDE AND ALBUTEROL SULFATE 2.5; .5 MG/3ML; MG/3ML
3 SOLUTION RESPIRATORY (INHALATION) ONCE
Status: COMPLETED | OUTPATIENT
Start: 2017-06-02 | End: 2017-06-02

## 2017-06-02 RX ORDER — AMOXICILLIN AND CLAVULANATE POTASSIUM 875; 125 MG/1; MG/1
1 TABLET, FILM COATED ORAL 2 TIMES DAILY
Qty: 20 TAB | Refills: 0 | Status: SHIPPED | OUTPATIENT
Start: 2017-06-02 | End: 2017-06-12

## 2017-06-02 RX ORDER — AMOXICILLIN AND CLAVULANATE POTASSIUM 875; 125 MG/1; MG/1
1 TABLET, FILM COATED ORAL
Status: COMPLETED | OUTPATIENT
Start: 2017-06-02 | End: 2017-06-02

## 2017-06-02 RX ORDER — IBUPROFEN 600 MG/1
600 TABLET ORAL
Qty: 30 TAB | Refills: 0 | Status: SHIPPED | OUTPATIENT
Start: 2017-06-02 | End: 2018-02-08

## 2017-06-02 RX ORDER — KETOROLAC TROMETHAMINE 30 MG/ML
30 INJECTION, SOLUTION INTRAMUSCULAR; INTRAVENOUS
Status: COMPLETED | OUTPATIENT
Start: 2017-06-02 | End: 2017-06-02

## 2017-06-02 RX ADMIN — KETOROLAC TROMETHAMINE 30 MG: 30 INJECTION, SOLUTION INTRAMUSCULAR at 03:19

## 2017-06-02 RX ADMIN — GUAIFENESIN AND CODEINE PHOSPHATE 10 ML: 100; 10 SOLUTION ORAL at 02:38

## 2017-06-02 RX ADMIN — IPRATROPIUM BROMIDE AND ALBUTEROL SULFATE 3 ML: .5; 3 SOLUTION RESPIRATORY (INHALATION) at 03:19

## 2017-06-02 RX ADMIN — AMOXICILLIN AND CLAVULANATE POTASSIUM 1 TABLET: 875; 125 TABLET, FILM COATED ORAL at 03:56

## 2017-06-02 NOTE — ED NOTES
Dr. Gold Mar reviewed discharge instructions with the patient. The patient verbalized understanding.

## 2017-06-02 NOTE — DISCHARGE INSTRUCTIONS
Cough: Care Instructions  Your Care Instructions  A cough is your body's response to something that bothers your throat or airways. Many things can cause a cough. You might cough because of a cold or the flu, bronchitis, or asthma. Smoking, postnasal drip, allergies, and stomach acid that backs up into your throat also can cause coughs. A cough is a symptom, not a disease. Most coughs stop when the cause, such as a cold, goes away. You can take a few steps at home to cough less and feel better. Follow-up care is a key part of your treatment and safety. Be sure to make and go to all appointments, and call your doctor if you are having problems. It's also a good idea to know your test results and keep a list of the medicines you take. How can you care for yourself at home? · Drink lots of water and other fluids. This helps thin the mucus and soothes a dry or sore throat. Honey or lemon juice in hot water or tea may ease a dry cough. · Take cough medicine as directed by your doctor. · Prop up your head on pillows to help you breathe and ease a dry cough. · Try cough drops to soothe a dry or sore throat. Cough drops don't stop a cough. Medicine-flavored cough drops are no better than candy-flavored drops or hard candy. · Do not smoke. Avoid secondhand smoke. If you need help quitting, talk to your doctor about stop-smoking programs and medicines. These can increase your chances of quitting for good. When should you call for help? Call 911 anytime you think you may need emergency care. For example, call if:  · You have severe trouble breathing. Call your doctor now or seek immediate medical care if:  · You cough up blood. · You have new or worse trouble breathing. · You have a new or higher fever. · You have a new rash.   Watch closely for changes in your health, and be sure to contact your doctor if:  · You cough more deeply or more often, especially if you notice more mucus or a change in the color of your mucus. · You have new symptoms, such as a sore throat, an earache, or sinus pain. · You do not get better as expected. Where can you learn more? Go to http://komal-kameron.info/. Enter D279 in the search box to learn more about \"Cough: Care Instructions. \"  Current as of: May 27, 2016  Content Version: 11.2  © 1323-0646 [employer]. Care instructions adapted under license by [employer] (which disclaims liability or warranty for this information). If you have questions about a medical condition or this instruction, always ask your healthcare professional. [address] any warranty or liability for your use of this information.

## 2017-06-02 NOTE — ED PROVIDER NOTES
HPI Comments: Reynold Hines is a 55 y.o. Male, with a pertinent PMHx of HTN, who presents ambulatory to the ED c/o a progressively worsening dry cough for 3 days. Pt reports associated symptoms of chills, congestion, and generalized aching. Pt reports he is currently in a treatment facility for cocaine use. Pt denies a h/o asthma. Pt specifically denies a fever. Of note, pt reports he is compliant with taking his BP medication. Social hx: - Tobacco use, + EtOH use, - Illicit drug use    PCP: None    There are no other complaints, changes or physical findings at this time. The history is provided by the patient. No  was used. Past Medical History:   Diagnosis Date    Gout     Hypertension     Other ill-defined conditions     gout       Past Surgical History:   Procedure Laterality Date    HX HEENT      eyes    HX ORTHOPAEDIC      right knee petella tendon repair         Family History:   Problem Relation Age of Onset    Diabetes Neg Hx        Social History     Social History    Marital status: SINGLE     Spouse name: N/A    Number of children: N/A    Years of education: N/A     Occupational History    Not on file. Social History Main Topics    Smoking status: Never Smoker    Smokeless tobacco: Never Used    Alcohol use 14.4 oz/week     24 Cans of beer per week      Comment: 12 a week    Drug use: No    Sexual activity: Not on file     Other Topics Concern    Not on file     Social History Narrative    ** Merged History Encounter **              ALLERGIES: Review of patient's allergies indicates no known allergies. Review of Systems   Constitutional: Positive for chills. Negative for fever. HENT: Positive for congestion. Negative for rhinorrhea, sneezing and sore throat. Eyes: Negative. Negative for redness and visual disturbance. Respiratory: Positive for cough. Negative for shortness of breath and wheezing. Cardiovascular: Negative.   Negative for chest pain and leg swelling. Gastrointestinal: Negative. Negative for abdominal pain, diarrhea, nausea and vomiting. Genitourinary: Negative. Negative for difficulty urinating, discharge and frequency. Musculoskeletal: Positive for myalgias (diffuse). Negative for arthralgias, back pain and neck stiffness. Skin: Negative. Negative for color change and rash. Neurological: Negative. Negative for dizziness, syncope, weakness, numbness and headaches. Hematological: Negative for adenopathy. Psychiatric/Behavioral: Negative. All other systems reviewed and are negative. Vitals:    06/02/17 0159 06/02/17 0202   BP:  (!) 161/94   Pulse: 91    Resp: 18    Temp: 98.2 °F (36.8 °C)    SpO2: 96% 97%   Weight: 116.2 kg (256 lb 2.8 oz)    Height: 6' 3\" (1.905 m)             Physical Exam   Constitutional: He is oriented to person, place, and time. HENT:   Head: Atraumatic. Frontal and maxillary sinus tenderness, BL nasal congestion, intermittent paroxysm of dry cough   Eyes: EOM are normal.   Cardiovascular: Normal rate, regular rhythm, normal heart sounds and intact distal pulses. Exam reveals no gallop and no friction rub. No murmur heard. Pulmonary/Chest: Effort normal and breath sounds normal. No respiratory distress. He has no wheezes. He has no rales. He exhibits no tenderness. Lungs were clear   Abdominal: Soft. Bowel sounds are normal. He exhibits no distension and no mass. There is no tenderness. There is no rebound and no guarding. Musculoskeletal: Normal range of motion. He exhibits no edema or tenderness. Neurological: He is alert and oriented to person, place, and time. Psychiatric: He has a normal mood and affect. Nursing note and vitals reviewed.        MDM  Number of Diagnoses or Management Options  Diagnosis management comments: DDx: URI, bronchitis, low suspicion for PNA, sinusitis, in patient currently undergoing rehabilitation for cocaine addiction       Amount and/or Complexity of Data Reviewed  Clinical lab tests: ordered and reviewed  Tests in the radiology section of CPT®: ordered and reviewed  Review and summarize past medical records: yes  Independent visualization of images, tracings, or specimens: yes    Patient Progress  Patient progress: stable    ED Course       Procedures  PROGRESS NOTE:  3:38 AM  Pt has been re-evaluated. Pt states he feels an improvement with his cough after the treatment. Pt's vital signs remain stable up to the time of discharge. LABORATORY TESTS:  Recent Results (from the past 12 hour(s))   CBC WITH AUTOMATED DIFF    Collection Time: 06/02/17  2:12 AM   Result Value Ref Range    WBC 5.6 4.1 - 11.1 K/uL    RBC 4.22 4.10 - 5.70 M/uL    HGB 12.1 12.1 - 17.0 g/dL    HCT 37.6 36.6 - 50.3 %    MCV 89.1 80.0 - 99.0 FL    MCH 28.7 26.0 - 34.0 PG    MCHC 32.2 30.0 - 36.5 g/dL    RDW 14.3 11.5 - 14.5 %    PLATELET 789 (L) 081 - 400 K/uL    NEUTROPHILS 64 32 - 75 %    LYMPHOCYTES 20 12 - 49 %    MONOCYTES 9 5 - 13 %    EOSINOPHILS 6 0 - 7 %    BASOPHILS 1 0 - 1 %    ABS. NEUTROPHILS 3.7 1.8 - 8.0 K/UL    ABS. LYMPHOCYTES 1.1 0.8 - 3.5 K/UL    ABS. MONOCYTES 0.5 0.0 - 1.0 K/UL    ABS. EOSINOPHILS 0.3 0.0 - 0.4 K/UL    ABS. BASOPHILS 0.0 0.0 - 0.1 K/UL   METABOLIC PANEL, COMPREHENSIVE    Collection Time: 06/02/17  2:12 AM   Result Value Ref Range    Sodium 142 136 - 145 mmol/L    Potassium 3.6 3.5 - 5.1 mmol/L    Chloride 110 (H) 97 - 108 mmol/L    CO2 24 21 - 32 mmol/L    Anion gap 8 5 - 15 mmol/L    Glucose 109 (H) 65 - 100 mg/dL    BUN 11 6 - 20 MG/DL    Creatinine 1.31 (H) 0.70 - 1.30 MG/DL    BUN/Creatinine ratio 8 (L) 12 - 20      GFR est AA >60 >60 ml/min/1.73m2    GFR est non-AA 59 (L) >60 ml/min/1.73m2    Calcium 8.7 8.5 - 10.1 MG/DL    Bilirubin, total 0.5 0.2 - 1.0 MG/DL    ALT (SGPT) 47 12 - 78 U/L    AST (SGOT) 47 (H) 15 - 37 U/L    Alk.  phosphatase 55 45 - 117 U/L    Protein, total 7.4 6.4 - 8.2 g/dL    Albumin 3.8 3.5 - 5.0 g/dL Globulin 3.6 2.0 - 4.0 g/dL    A-G Ratio 1.1 1.1 - 2.2     LACTIC ACID, PLASMA    Collection Time: 06/02/17  2:12 AM   Result Value Ref Range    Lactic acid 1.3 0.4 - 2.0 MMOL/L       IMAGING RESULTS:  CXR Results  (Last 48 hours)               06/02/17 0252  XR CHEST PA LAT Final result    Impression:  Impression: No acute process or change compared to the prior exam.           Narrative:  Exam:  2 view chest       Indication: Cough       Comparison to 12/26/2016. PA and lateral views demonstrate normal heart size. There is no acute process in   the lung fields. The osseous structures are unremarkable. MEDICATIONS GIVEN:  Medications   guaiFENesin-codeine (ROBITUSSIN AC) 100-10 mg/5 mL solution 10 mL (10 mL Oral Given 6/2/17 0238)   albuterol-ipratropium (DUO-NEB) 2.5 MG-0.5 MG/3 ML (3 mL Nebulization Given 6/2/17 0319)   ketorolac (TORADOL) injection 30 mg (30 mg IntraMUSCular Given 6/2/17 0319)       IMPRESSION:  1. Cough    2. Acute maxillary sinusitis, recurrence not specified    3. Acute upper respiratory infection    4. History of cocaine use        PLAN:  1. Discharge  Current Discharge Medication List      START taking these medications    Details   amoxicillin-clavulanate (AUGMENTIN) 875-125 mg per tablet Take 1 Tab by mouth two (2) times a day for 10 days. Qty: 20 Tab, Refills: 0      ibuprofen (MOTRIN) 600 mg tablet Take 1 Tab by mouth every eight (8) hours as needed for Pain. Qty: 30 Tab, Refills: 0           2. Follow-up Information     Follow up With Details Comments Contact Info    [facility]  As needed, If symptoms worsen [address]  [place] [address] [address]      [facility] EMERGENCY DEPT  As needed, If symptoms worsen [address] [place] [address]  [phone]        Return to ED if worse     DISCHARGE NOTE  3:38 AM  The patient has been re-evaluated and is ready for discharge. Reviewed available results with patient.  Counseled patient on diagnosis and care plan. Patient has expressed understanding, and all questions have been answered. Patient agrees with plan and agrees to follow up as recommended, or return to the ED if their symptoms worsen. Discharge instructions have been provided and explained to the patient, along with reasons to return to the ED. ATTESTATION:  This note is prepared by Romie Singh, acting as Scribe for Loretta Doyle MD.    Loretta Doyle MD: The scribe's documentation has been prepared under my direction and personally reviewed by me in its entirety. I confirm that the note above accurately reflects all work, treatment, procedures, and medical decision making performed by me.

## 2017-07-11 ENCOUNTER — APPOINTMENT (OUTPATIENT)
Dept: GENERAL RADIOLOGY | Age: 47
End: 2017-07-11
Attending: EMERGENCY MEDICINE
Payer: SELF-PAY

## 2017-07-11 ENCOUNTER — HOSPITAL ENCOUNTER (EMERGENCY)
Age: 47
Discharge: HOME | End: 2017-07-11
Attending: EMERGENCY MEDICINE
Payer: SELF-PAY

## 2017-07-11 VITALS
SYSTOLIC BLOOD PRESSURE: 147 MMHG | TEMPERATURE: 97.4 F | DIASTOLIC BLOOD PRESSURE: 96 MMHG | RESPIRATION RATE: 22 BRPM | WEIGHT: 249.78 LBS | BODY MASS INDEX: 31.06 KG/M2 | HEIGHT: 75 IN | OXYGEN SATURATION: 99 %

## 2017-07-11 DIAGNOSIS — R05.9 COUGH: Primary | ICD-10-CM

## 2017-07-11 DIAGNOSIS — R09.81 NASAL CONGESTION: ICD-10-CM

## 2017-07-11 DIAGNOSIS — R06.2 WHEEZE: ICD-10-CM

## 2017-07-11 PROCEDURE — 71020 XR CHEST PA LAT: CPT

## 2017-07-11 PROCEDURE — 94640 AIRWAY INHALATION TREATMENT: CPT

## 2017-07-11 PROCEDURE — 77030018744 HC FLOMTR PEAK FLO -A

## 2017-07-11 PROCEDURE — 74011636637 HC RX REV CODE- 636/637: Performed by: EMERGENCY MEDICINE

## 2017-07-11 PROCEDURE — 99283 EMERGENCY DEPT VISIT LOW MDM: CPT

## 2017-07-11 PROCEDURE — 74011000250 HC RX REV CODE- 250: Performed by: EMERGENCY MEDICINE

## 2017-07-11 PROCEDURE — 77030029684 HC NEB SM VOL KT MONA -A

## 2017-07-11 RX ORDER — IPRATROPIUM BROMIDE AND ALBUTEROL SULFATE 2.5; .5 MG/3ML; MG/3ML
3 SOLUTION RESPIRATORY (INHALATION)
Status: COMPLETED | OUTPATIENT
Start: 2017-07-11 | End: 2017-07-11

## 2017-07-11 RX ORDER — ATENOLOL 25 MG/1
25 TABLET ORAL DAILY
Qty: 30 TAB | Refills: 0 | Status: SHIPPED | OUTPATIENT
Start: 2017-07-11 | End: 2017-08-10

## 2017-07-11 RX ORDER — PREDNISONE 20 MG/1
60 TABLET ORAL DAILY
Qty: 15 TAB | Refills: 0 | Status: SHIPPED | OUTPATIENT
Start: 2017-07-11 | End: 2017-07-16

## 2017-07-11 RX ORDER — ALBUTEROL SULFATE 0.83 MG/ML
2.5 SOLUTION RESPIRATORY (INHALATION) ONCE
Status: COMPLETED | OUTPATIENT
Start: 2017-07-11 | End: 2017-07-11

## 2017-07-11 RX ORDER — BENZONATATE 100 MG/1
200 CAPSULE ORAL
Qty: 20 CAP | Refills: 0 | Status: SHIPPED | OUTPATIENT
Start: 2017-07-11 | End: 2017-07-18

## 2017-07-11 RX ORDER — PREDNISONE 20 MG/1
60 TABLET ORAL
Status: COMPLETED | OUTPATIENT
Start: 2017-07-11 | End: 2017-07-11

## 2017-07-11 RX ORDER — LISINOPRIL 10 MG/1
10 TABLET ORAL DAILY
Qty: 30 TAB | Refills: 0 | Status: SHIPPED | OUTPATIENT
Start: 2017-07-11 | End: 2017-09-05

## 2017-07-11 RX ORDER — PREDNISONE 20 MG/1
60 TABLET ORAL DAILY
Qty: 15 TAB | Refills: 0 | Status: SHIPPED | OUTPATIENT
Start: 2017-07-11 | End: 2017-07-11

## 2017-07-11 RX ADMIN — ALBUTEROL SULFATE 2.5 MG: 2.5 SOLUTION RESPIRATORY (INHALATION) at 10:03

## 2017-07-11 RX ADMIN — PREDNISONE 60 MG: 20 TABLET ORAL at 10:02

## 2017-07-11 RX ADMIN — IPRATROPIUM BROMIDE AND ALBUTEROL SULFATE 3 ML: .5; 3 SOLUTION RESPIRATORY (INHALATION) at 10:03

## 2017-07-11 NOTE — ED NOTES
Pt reports onset of nasal congestion/cough x several weeks. Pt denies any fever Pt also requests to have BP medication refilled Pt states he is due to see MD in Aug for BP but states he ran out.  Pt alert oriented x 4 Skin warm dry intact Updated on plan with pending evaluation by MD.

## 2017-07-11 NOTE — ED PROVIDER NOTES
HPI Comments:   Karla Payne is a 55 y.o. male with a hx of HTN, seasonal allergies, and gout presenting to the ED C/O nasal congestion which started over 1 month ago. Associated symptoms include cough with occasional brown sputum production and SOB. He has been taking Mucinex with no relief. Pt was previously seen in this ED for the symptoms 1.5 months ago, rx'd an inhaler, Augmentin, and Prednisone, and his cough had resolved, but has since returned along with the ongoing congestion. Pt denies any cigarette use or secondhand smoke exposure and had a hx of asthma as a child but it resolved. He is also requesting a refill of his HTN medications as he was unable to schedule an appointment with his PCP for several weeks and has run out of his current prescription. Pt takes lisinopril 10mg and atenolol 25mg for his HTN. Patient denies leg swelling or any other symptoms or complaints. There are no other complaints, changes or physical findings at this time. Written by CJ Lew, as dictated by Ashley Howell MD      The history is provided by the patient. No  was used. Past Medical History:   Diagnosis Date    Gout     Hypertension     Other ill-defined conditions     gout       Past Surgical History:   Procedure Laterality Date    HX HEENT      eyes    HX ORTHOPAEDIC      right knee petella tendon repair         Family History:   Problem Relation Age of Onset    Diabetes Neg Hx        Social History     Social History    Marital status: SINGLE     Spouse name: N/A    Number of children: N/A    Years of education: N/A     Occupational History    Not on file.      Social History Main Topics    Smoking status: Never Smoker    Smokeless tobacco: Never Used    Alcohol use 14.4 oz/week     24 Cans of beer per week      Comment: 12 a week    Drug use: No    Sexual activity: Not on file     Other Topics Concern    Not on file     Social History Narrative    ** Merged History Encounter **              ALLERGIES: Review of patient's allergies indicates no known allergies. Review of Systems   HENT: Positive for congestion. Respiratory: Positive for cough and shortness of breath. Cardiovascular: Negative for leg swelling. All other systems reviewed and are negative. Vitals:    07/11/17 0833   BP: (!) 147/96   Resp: 22   Temp: 97.4 °F (36.3 °C)   SpO2: 99%   Weight: 113.3 kg (249 lb 12.5 oz)   Height: 6' 3\" (1.905 m)            Physical Exam     Vital signs and nursing notes reviewed    CONSTITUTIONAL: Alert, in no apparent distress; well-developed; well-nourished. HEAD:  Normocephalic, atraumatic  EYES: PERRL; EOM's intact. ENTM: Clear effusions in bilateral TM's; Nose: clear nasal congestion; Throat: no erythema or exudate, mucous membranes moist  Neck:  Supple. trachea is midline. RESP: Chest clear, equal breath sounds. - W/R/R  CV: S1 and S2 WNL; No murmurs, gallops or rubs. 2+ radial and DP pulses bilaterally. GI: non-distended, normal bowel sounds, abdomen soft and non-tender. No masses or organomegaly. : No costo-vertebral angle tenderness. BACK:  Non-tender, normal appearance  UPPER EXT:  Normal inspection. no joint or soft tissue swelling  LOWER EXT: No edema, no calf tenderness. NEURO: Alert and oriented x3, 5/5 strength and light touch sensation intact in bilateral upper and lower extremities. SKIN: No rashes; Warm and dry  PSYCH: Normal mood, normal affect     MDM  Number of Diagnoses or Management Options  Diagnosis management comments: 55 y.o. male with childhood hx of asthma with coughing and wheezing without hypoxia, differential includes RAD, sinusitis with post nasal drip causing reactive bronchitis, no overt risk factors for lung CA, no hemoptysis, plan for peak flow, treatment of obstructive airway symptoms, discharge with pulmonary info for f/u.        Amount and/or Complexity of Data Reviewed  Tests in the radiology section of CPT®: ordered and reviewed  Review and summarize past medical records: yes    Patient Progress  Patient progress: stable    Procedures    IMAGING RESULTS:  CXR Results  (Last 48 hours)               07/11/17 0943  XR CHEST PA LAT Final result    Impression:  IMPRESSION: No acute cardiopulmonary disease. Narrative: Indication:  acute cough with brown sputum production        Exam: PA and lateral views of the chest.       Direct comparison is made to prior CXR dated 6/2017. Findings: Cardiomediastinal silhouette is within normal limits. Lungs are clear   bilaterally. Pleural spaces are normal. Osseous structures are intact. MEDICATIONS GIVEN:  Medications   albuterol-ipratropium (DUO-NEB) 2.5 MG-0.5 MG/3 ML (3 mL Nebulization Given 7/11/17 1003)   albuterol (PROVENTIL VENTOLIN) nebulizer solution 2.5 mg (2.5 mg Nebulization Given 7/11/17 1003)   predniSONE (DELTASONE) tablet 60 mg (60 mg Oral Given 7/11/17 1002)       IMPRESSION:  1. Cough    2. Wheeze    3. Nasal congestion        PLAN:  1. Current Discharge Medication List      START taking these medications    Details   albuterol sulfate 90 mcg/actuation aepb Take 2 Puffs by inhalation every four (4) hours for 10 days. With spacer  Qty: 1 Inhaler, Refills: 2      predniSONE (DELTASONE) 20 mg tablet Take 3 Tabs by mouth daily for 5 days. Qty: 15 Tab, Refills: 0      benzonatate (TESSALON PERLES) 100 mg capsule Take 2 Caps by mouth three (3) times daily as needed for Cough for up to 7 days. Qty: 20 Cap, Refills: 0         CONTINUE these medications which have CHANGED    Details   atenolol (TENORMIN) 25 mg tablet Take 1 Tab by mouth daily for 30 doses. Qty: 30 Tab, Refills: 0      lisinopril (PRINIVIL, ZESTRIL) 10 mg tablet Take 1 Tab by mouth daily. Qty: 30 Tab, Refills: 0           2.    Follow-up Information     Follow up With Details Comments HEATHER Reyna MD  Please call for an appointment to be seen about your coughing and wheezing. [address]  [facility]  Thai Deleon  [facility]  [phone]      [facility] EMERGENCY DEPT  If symptoms worsen [address] [facility] [address]  [phone]        Return to ED if worse     Discharge Note:  10:31 AM  The patient is ready for discharge. The patient's signs, symptoms, diagnosis, and discharge instruction have been discussed and the patient has conveyed their understanding. The patient is to follow up as recommended or return to the ER should their symptoms worsen. Plan has been discussed and the patient is in agreement. Written by Alvin Rincon, ED Scribe, as dictated by Marlana Seip, MD.     Attestation: This note is prepared by Alvin Rincon, acting as Scribe for Marlana Seip, MD.    Marlana Seip, MD: The scribe's documentation has been prepared under my direction and personally reviewed by me in its entirety. I confirm that the note above accurately reflects all work, treatment, procedures, and medical decision making performed by me.

## 2017-07-11 NOTE — ED NOTES
Pt discharged by Dr Kenny Rousseau. Pt provided with discharge instructions Rx and instructions on follow up care. Pt out of ED under own power with steady gait accompanied by self.

## 2017-07-11 NOTE — DISCHARGE INSTRUCTIONS
Please  and take as directed your inhaler, steroids and cough suppressant. Prescriptions for your blood pressure medications were also sent to your preferred pharmacy. Please call Dr. Crista Sorensen office for lung appointment about your recurrent wheezing. Wheezing or Bronchoconstriction: Care Instructions  Your Care Instructions  Wheezing is a whistling noise made during breathing. It occurs when the small airways, or bronchial tubes, that lead to your lungs swell or contract (spasm) and become narrow. This narrowing is called bronchoconstriction. When your airways constrict, it is hard for air to pass through and this makes it hard for you to breathe. Wheezing and bronchoconstriction can be caused by many problems, including:  · An infection such as the flu or a cold. · Allergies such as hay fever. · Diseases such as asthma or chronic obstructive pulmonary disease. · Smoking. Treatment for your wheezing depends on what is causing the problem. Your wheezing may get better without treatment. But you may need to pay attention to things that cause your wheezing and avoid them. Or you may need medicine to help treat the wheezing and to reduce the swelling or to relieve spasms in your lungs. Follow-up care is a key part of your treatment and safety. Be sure to make and go to all appointments, and call your doctor if you are having problems. It is also a good idea to know your test results and keep a list of the medicines you take. How can you care for yourself at home? · Take your medicine exactly as prescribed. Call your doctor if you think you are having a problem with your medicine. You will get more details on the specific medicine your doctor prescribes. · If your doctor prescribed antibiotics, take them as directed. Do not stop taking them just because you feel better. You need to take the full course of antibiotics.   · Breathe moist air from a humidifier, hot shower, or sink filled with hot water. This may help ease your symptoms and make it easier for you to breathe. · If you have congestion in your nose and throat, drinking plenty of fluids, especially hot fluids, may help relieve your symptoms. If you have kidney, heart, or liver disease and have to limit fluids, talk with your doctor before you increase the amount of fluids you drink. · If you have mucus in your airways, it may help to breathe deeply and cough. · Do not smoke or allow others to smoke around you. Smoking can make your wheezing worse. If you need help quitting, talk to your doctor about stop-smoking programs and medicines. These can increase your chances of quitting for good. · Avoid things that may cause your wheezing. These may include colds, smoke, air pollution, dust, pollen, pets, cockroaches, stress, and cold air. When should you call for help? Call 911 anytime you think you may need emergency care. For example, call if:  · You have severe trouble breathing. · You passed out (lost consciousness). Call your doctor now or seek immediate medical care if:  · You cough up yellow, dark brown, or bloody mucus (sputum). · You have new or worse shortness of breath. · Your wheezing is not getting better or it gets worse after you start taking your medicine. Watch closely for changes in your health, and be sure to contact your doctor if:  · You do not get better as expected. Where can you learn more? Go to http://komal-kameron.info/. Enter 454 6587 in the search box to learn more about \"Wheezing or Bronchoconstriction: Care Instructions. \"  Current as of: March 25, 2017  Content Version: 11.3  © 0297-1507 [employer] Care instructions adapted under license by [employer] (which disclaims liability or warranty for this information).  If you have questions about a medical condition or this instruction, always ask your healthcare professional. Alli Ferreira disclaims any warranty or liability for your use of this information.

## 2017-07-11 NOTE — LETTER
[address] 
[facility] EMERGENCY DEPT 
[address] [place] [address] [address] [phone] 
[phone] Work/School Note Date: 7/11/2017 To Whom It May concern: 
 
Bhavya Arroyo was seen and treated today in the emergency room by the following provider(s): 
Attending Provider: Cally Bravo MD. Bhavya Arroyo Please excuse from work on 7/11/17. Sincerely, Cally Barvo MD

## 2017-09-05 ENCOUNTER — HOSPITAL ENCOUNTER (EMERGENCY)
Age: 47
Discharge: HOME | End: 2017-09-05
Attending: EMERGENCY MEDICINE | Admitting: EMERGENCY MEDICINE
Payer: SELF-PAY

## 2017-09-05 VITALS
DIASTOLIC BLOOD PRESSURE: 108 MMHG | TEMPERATURE: 98.2 F | SYSTOLIC BLOOD PRESSURE: 186 MMHG | RESPIRATION RATE: 16 BRPM | OXYGEN SATURATION: 100 % | HEART RATE: 74 BPM

## 2017-09-05 DIAGNOSIS — M10.9 ACUTE GOUT INVOLVING TOE OF RIGHT FOOT, UNSPECIFIED CAUSE: Primary | ICD-10-CM

## 2017-09-05 DIAGNOSIS — Z76.0 MEDICATION REFILL: ICD-10-CM

## 2017-09-05 DIAGNOSIS — I10 ESSENTIAL HYPERTENSION: ICD-10-CM

## 2017-09-05 PROCEDURE — 74011250637 HC RX REV CODE- 250/637: Performed by: PHYSICIAN ASSISTANT

## 2017-09-05 PROCEDURE — 99283 EMERGENCY DEPT VISIT LOW MDM: CPT

## 2017-09-05 PROCEDURE — 74011250636 HC RX REV CODE- 250/636: Performed by: PHYSICIAN ASSISTANT

## 2017-09-05 PROCEDURE — 96372 THER/PROPH/DIAG INJ SC/IM: CPT

## 2017-09-05 RX ORDER — INDOMETHACIN 25 MG/1
CAPSULE ORAL 3 TIMES DAILY
COMMUNITY
End: 2017-09-05

## 2017-09-05 RX ORDER — ATENOLOL 25 MG/1
25 TABLET ORAL DAILY
COMMUNITY
End: 2017-09-05

## 2017-09-05 RX ORDER — OXYCODONE AND ACETAMINOPHEN 5; 325 MG/1; MG/1
1 TABLET ORAL
Status: COMPLETED | OUTPATIENT
Start: 2017-09-05 | End: 2017-09-05

## 2017-09-05 RX ORDER — ATENOLOL 25 MG/1
25 TABLET ORAL DAILY
Qty: 30 TAB | Refills: 0 | Status: SHIPPED | OUTPATIENT
Start: 2017-09-05 | End: 2017-11-14

## 2017-09-05 RX ORDER — OXYCODONE AND ACETAMINOPHEN 5; 325 MG/1; MG/1
1 TABLET ORAL
Qty: 15 TAB | Refills: 0 | Status: SHIPPED | OUTPATIENT
Start: 2017-09-05 | End: 2017-11-14

## 2017-09-05 RX ORDER — INDOMETHACIN 25 MG/1
50 CAPSULE ORAL 3 TIMES DAILY
Qty: 30 CAP | Refills: 1 | Status: SHIPPED | OUTPATIENT
Start: 2017-09-05 | End: 2017-11-14

## 2017-09-05 RX ORDER — LISINOPRIL 10 MG/1
10 TABLET ORAL DAILY
Qty: 30 TAB | Refills: 0 | Status: SHIPPED | OUTPATIENT
Start: 2017-09-05 | End: 2017-11-14

## 2017-09-05 RX ADMIN — METHYLPREDNISOLONE SODIUM SUCCINATE 125 MG: 125 INJECTION, POWDER, FOR SOLUTION INTRAMUSCULAR; INTRAVENOUS at 08:39

## 2017-09-05 RX ADMIN — OXYCODONE HYDROCHLORIDE AND ACETAMINOPHEN 1 TABLET: 5; 325 TABLET ORAL at 08:39

## 2017-09-05 NOTE — ED NOTES
Patient arrives for gout flare up that stared yesterday. Patient reports that he is out of his indomethacin. Patient also reports that he takes medication for hypertension that he is out of. No distress noted.

## 2017-09-05 NOTE — ED PROVIDER NOTES
HPI Comments: Serafin Ortiz is a 55 y.o. male with pertinent PMHx of hypertension and gout who presents ambulatory to ED c/o constant, moderate, right great toe pain since yesterday. Pt states he has a history of similar pain with gout in the past, and he typically takes Indomethacin, Percocet, and steroids for previous flare ups. Pt adds that he ran out of his prescription hypertensive medications Atenolol 25 mg and Lisinopril 10 mg last week, and he is scheduled to see his PCP at [address] [place] [address] in 3-4 weeks. Pt denies any recent injury or trauma. Social Hx:  tobacco use (-), EtOH use (+)    There are no other complaints, changes or physical findings at this time. The history is provided by the patient. No  was used. Past Medical History:   Diagnosis Date    Gout     Hypertension     Other ill-defined conditions     gout       Past Surgical History:   Procedure Laterality Date    HX HEENT      eyes    HX ORTHOPAEDIC      right knee petella tendon repair         Family History:   Problem Relation Age of Onset    Diabetes Neg Hx        Social History     Social History    Marital status: SINGLE     Spouse name: N/A    Number of children: N/A    Years of education: N/A     Occupational History    Not on file. Social History Main Topics    Smoking status: Never Smoker    Smokeless tobacco: Never Used    Alcohol use 14.4 oz/week     24 Cans of beer per week      Comment: 12 a week    Drug use: No    Sexual activity: Not on file     Other Topics Concern    Not on file     Social History Narrative    ** Merged History Encounter **              ALLERGIES: Review of patient's allergies indicates no known allergies. Review of Systems   Constitutional: Negative for chills and fever. HENT: Negative for ear pain and sore throat. Eyes: Negative for pain, redness and visual disturbance.    Respiratory: Negative for chest tightness, shortness of breath and wheezing. Cardiovascular: Negative for chest pain and palpitations. Gastrointestinal: Negative for abdominal pain, diarrhea, nausea and vomiting. Genitourinary: Negative for dysuria, frequency and urgency. Musculoskeletal: Positive for arthralgias (right great toe). Negative for back pain, myalgias and neck pain. Skin: Negative for rash and wound. Neurological: Negative for dizziness, weakness, numbness and headaches. Hematological: Negative for adenopathy. Psychiatric/Behavioral: Negative for dysphoric mood. The patient is not nervous/anxious. Patient Vitals for the past 12 hrs:   Temp Pulse Resp BP SpO2   09/05/17 0836 98.2 °F (36.8 °C) 74 16 (!) 186/108 100 %         Physical Exam   Constitutional: He is oriented to person, place, and time. He appears well-developed and well-nourished. Non-toxic appearance. No distress. HENT:   Head: Normocephalic and atraumatic. Head is without right periorbital erythema and without left periorbital erythema. Right Ear: External ear normal.   Left Ear: External ear normal.   Nose: Nose normal.   Mouth/Throat: Uvula is midline. No trismus in the jaw. Eyes: Conjunctivae and EOM are normal. Pupils are equal, round, and reactive to light. No scleral icterus. Neck: Normal range of motion and full passive range of motion without pain. Cardiovascular: Normal rate, regular rhythm and normal heart sounds. Pulmonary/Chest: Effort normal and breath sounds normal. No accessory muscle usage. No tachypnea. No respiratory distress. He has no decreased breath sounds. He has no wheezes. Abdominal: Soft. There is no tenderness. There is no rigidity and no guarding. Musculoskeletal: Normal range of motion. Tenderness to palpation of right 1st MTPJ. No bruising, redness, or swelling. Neurological: He is alert and oriented to person, place, and time. He is not disoriented. No cranial nerve deficit or sensory deficit. GCS eye subscore is 4.  GCS verbal subscore is 5. GCS motor subscore is 6. Skin: Skin is intact. No rash noted. Psychiatric: He has a normal mood and affect. His speech is normal.   Nursing note and vitals reviewed. MDM  Number of Diagnoses or Management Options  Acute gout involving toe of right foot, unspecified cause:   Essential hypertension:   Medication refill:   Diagnosis management comments: Monoarticular joint pain of the right great toe without injury, and with a history of gout. Will treat as gout flare. He has a primary care appointment pending; will offer a limited supply of blood pressure medication. Discouraged from using the emergency department for routine medication refills. Amount and/or Complexity of Data Reviewed  Review and summarize past medical records: yes    Patient Progress  Patient progress: stable    ED Course       Procedures    8:29 AM  EHR and  reviewed. MEDICATIONS GIVEN:  Medications   methylPREDNISolone (PF) (SOLU-MEDROL) injection 125 mg (125 mg IntraMUSCular Given 9/5/17 0839)   oxyCODONE-acetaminophen (PERCOCET) 5-325 mg per tablet 1 Tab (1 Tab Oral Given 9/5/17 0839)       IMPRESSION:  1. Acute gout involving toe of right foot, unspecified cause    2. Essential hypertension    3. Medication refill        PLAN:  1. Discharge Medication List as of 9/5/2017  8:45 AM      START taking these medications    Details   oxyCODONE-acetaminophen (PERCOCET) 5-325 mg per tablet Take 1 Tab by mouth every four (4) hours as needed for Pain. Max Daily Amount: 6 Tabs., Print, Disp-15 Tab, R-0         CONTINUE these medications which have CHANGED    Details   indomethacin (INDOCIN) 25 mg capsule Take 2 Caps by mouth three (3) times daily. , Print, Disp-30 Cap, R-1      atenolol (TENORMIN) 25 mg tablet Take 1 Tab by mouth daily. , Print, Disp-30 Tab, R-0      lisinopril (PRINIVIL, ZESTRIL) 10 mg tablet Take 1 Tab by mouth daily. , Print, Disp-30 Tab, R-0         CONTINUE these medications which have NOT CHANGED    Details   ibuprofen (MOTRIN) 600 mg tablet Take 1 Tab by mouth every eight (8) hours as needed for Pain., Normal, Disp-30 Tab, R-0           2. Follow-up Information     Follow up With Details Comments [address] Schedule an appointment as soon as possible for a visit PRIMARY CARE: call today to schedule follow up [address]/ [address] [phone]        Return to ED if worse       DISCHARGE NOTE  8:46 AM  The patient has been re-evaluated and is ready for discharge. Reviewed available results with patient. Counseled pt on diagnosis and care plan. Pt has expressed understanding, and all questions have been answered. Pt agrees with plan and agrees to follow up as recommended, or return to the ED if their symptoms worsen. Discharge instructions have been provided and explained to the pt, along with reasons to return to the ED. Attestations: This note is prepared by Yifan Murcia. Nadja Foy, acting as Scribe for Dara Henry. HANK Helm: The scribe's documentation has been prepared under my direction and personally reviewed by me in its entirety. I confirm that the note above accurately reflects all work, treatment, procedures, and medical decision making performed by me.

## 2017-09-05 NOTE — ED TRIAGE NOTES
\"I have gout in my right foot. I ran out of my Indomethacin, I need some more. \" Patient also wants refills on blood pressure medications.

## 2017-09-05 NOTE — DISCHARGE INSTRUCTIONS
Thank you for allowing us to provide you with care today. We hope we addressed all of your concerns and needs. We strive to provide excellent quality care in the Emergency Department. Please rate us as excellent, as anything less than excellent does not meet our expectations. If you feel that you have not received excellent quality care or timely care, please ask to speak to the nurse manager. Please choose us in the future for your continued health care needs. The exam and treatment you received in the Emergency Department were for an urgent problem and are not intended as complete care. It is important that you follow-up with a doctor, nurse practitioner, or  [id number] assistant to: (1) confirm your diagnosis, (2) re-evaluation of changes in your illness and treatment, and (3) for ongoing care. If your symptoms become worse or you do not improve as expected and you are unable to reach your usual health care provider, you should return to the Emergency Department. We are available 24 hours a day. Take this sheet with you when you go to your follow-up visit. If you have any problem arranging the follow-up visit, contact the Emergency Department immediately. Make an appointment with your Primary Care doctor for follow up of this visit. Return to the ER if you are unable to be seen in the time recommended on your discharge instructions.

## 2017-09-05 NOTE — LETTER
[address] 
[facility] EMERGENCY DEPT 
[address] [id number] 
[phone] Work/School Note Date: 9/5/2017 To Whom It May concern: 
 
Padmini Brand was seen and treated today in the emergency room by the following provider(s): 
Attending Provider: Jennifer Rivera MD 
Physician Assistant: EM Herrera. Padmini Brand may return to work on [id number]. Sincerely, EM Herrera

## 2017-11-14 ENCOUNTER — HOSPITAL ENCOUNTER (EMERGENCY)
Age: 47
Discharge: HOME | End: 2017-11-14
Attending: EMERGENCY MEDICINE
Payer: SELF-PAY

## 2017-11-14 VITALS
OXYGEN SATURATION: 98 % | BODY MASS INDEX: 31.11 KG/M2 | DIASTOLIC BLOOD PRESSURE: 116 MMHG | SYSTOLIC BLOOD PRESSURE: 165 MMHG | WEIGHT: 250.22 LBS | HEART RATE: 66 BPM | TEMPERATURE: 97.6 F | RESPIRATION RATE: 16 BRPM | HEIGHT: 75 IN

## 2017-11-14 DIAGNOSIS — M10.9 ACUTE GOUT OF RIGHT HAND, UNSPECIFIED CAUSE: Primary | ICD-10-CM

## 2017-11-14 PROCEDURE — 96372 THER/PROPH/DIAG INJ SC/IM: CPT

## 2017-11-14 PROCEDURE — 99283 EMERGENCY DEPT VISIT LOW MDM: CPT

## 2017-11-14 PROCEDURE — 74011250637 HC RX REV CODE- 250/637: Performed by: EMERGENCY MEDICINE

## 2017-11-14 PROCEDURE — 74011250636 HC RX REV CODE- 250/636: Performed by: EMERGENCY MEDICINE

## 2017-11-14 RX ORDER — METHYLPREDNISOLONE 4 MG/1
TABLET ORAL
Qty: 1 DOSE PACK | Refills: 0 | Status: SHIPPED | OUTPATIENT
Start: 2017-11-14 | End: 2018-02-08

## 2017-11-14 RX ORDER — OXYCODONE AND ACETAMINOPHEN 5; 325 MG/1; MG/1
1 TABLET ORAL
Qty: 15 TAB | Refills: 0 | Status: SHIPPED | OUTPATIENT
Start: 2017-11-14 | End: 2018-02-08

## 2017-11-14 RX ORDER — LISINOPRIL 10 MG/1
10 TABLET ORAL DAILY
Qty: 30 TAB | Refills: 0 | Status: SHIPPED | OUTPATIENT
Start: 2017-11-14 | End: 2018-02-08

## 2017-11-14 RX ORDER — COLCHICINE 0.6 MG/1
0.6 TABLET ORAL
Status: COMPLETED | OUTPATIENT
Start: 2017-11-14 | End: 2017-11-14

## 2017-11-14 RX ORDER — OMEPRAZOLE 20 MG/1
20 CAPSULE, DELAYED RELEASE ORAL DAILY
Qty: 20 CAP | Refills: 0 | Status: SHIPPED | OUTPATIENT
Start: 2017-11-14 | End: 2018-02-08

## 2017-11-14 RX ORDER — OXYCODONE AND ACETAMINOPHEN 5; 325 MG/1; MG/1
2 TABLET ORAL
Status: COMPLETED | OUTPATIENT
Start: 2017-11-14 | End: 2017-11-14

## 2017-11-14 RX ORDER — OXYCODONE AND ACETAMINOPHEN 5; 325 MG/1; MG/1
1 TABLET ORAL
Qty: 15 TAB | Refills: 0 | Status: SHIPPED | OUTPATIENT
Start: 2017-11-14 | End: 2017-11-14

## 2017-11-14 RX ORDER — ATENOLOL 25 MG/1
25 TABLET ORAL DAILY
Qty: 30 TAB | Refills: 0 | Status: SHIPPED | OUTPATIENT
Start: 2017-11-14 | End: 2018-02-08

## 2017-11-14 RX ORDER — COLCHICINE 0.6 MG/1
0.6 TABLET ORAL DAILY
Qty: 20 TAB | Refills: 0 | Status: SHIPPED | OUTPATIENT
Start: 2017-11-14 | End: 2018-02-08

## 2017-11-14 RX ORDER — INDOMETHACIN 25 MG/1
50 CAPSULE ORAL 3 TIMES DAILY
Qty: 30 CAP | Refills: 1 | Status: SHIPPED | OUTPATIENT
Start: 2017-11-14 | End: 2018-02-08

## 2017-11-14 RX ADMIN — METHYLPREDNISOLONE SODIUM SUCCINATE 125 MG: 125 INJECTION, POWDER, FOR SOLUTION INTRAMUSCULAR; INTRAVENOUS at 05:08

## 2017-11-14 RX ADMIN — COLCHICINE 0.6 MG: 0.6 TABLET, FILM COATED ORAL at 05:59

## 2017-11-14 RX ADMIN — OXYCODONE HYDROCHLORIDE AND ACETAMINOPHEN 2 TABLET: 5; 325 TABLET ORAL at 05:08

## 2017-11-14 NOTE — ED TRIAGE NOTES
Chief Complaint: 2nd finger pain due to gout   Patient states started yesterday with pain and numbness to right 2nd finger  Onset yesterday  Pt arrived via private car. Family with patient.

## 2017-11-14 NOTE — DISCHARGE INSTRUCTIONS
Gout: Care Instructions  Your Care Instructions    Gout is a form of arthritis caused by a buildup of uric acid crystals in a joint. It causes sudden attacks of pain, swelling, redness, and stiffness, usually in one joint, especially the big toe. Gout usually comes on without a cause. But it can be brought on by drinking alcohol (especially beer) or eating seafood and red meat. Taking certain medicines, such as diuretics or aspirin, also can bring on an attack of gout. Taking your medicines as prescribed and following up with your doctor regularly can help you avoid gout attacks in the future. Follow-up care is a key part of your treatment and safety. Be sure to make and go to all appointments, and call your doctor if you are having problems. It's also a good idea to know your test results and keep a list of the medicines you take. How can you care for yourself at home? · If the joint is swollen, put ice or a cold pack on the area for 10 to 20 minutes at a time. Put a thin cloth between the ice and your skin. · Prop up the sore limb on a pillow when you ice it or anytime you sit or lie down during the next 3 days. Try to keep it above the level of your heart. This will help reduce swelling. · Rest sore joints. Avoid activities that put weight or strain on the joints for a few days. Take short rest breaks from your regular activities during the day. · Take your medicines exactly as prescribed. Call your doctor if you think you are having a problem with your medicine. · Take pain medicines exactly as directed. ¨ If the doctor gave you a prescription medicine for pain, take it as prescribed. ¨ If you are not taking a prescription pain medicine, ask your doctor if you can take an over-the-counter medicine. · Eat less seafood and red meat. · Check with your doctor before drinking alcohol. · Losing weight, if you are overweight, may help reduce attacks of gout. But do not go on a [employer]. \" Losing a lot of weight in a short amount of time can cause a gout attack. When should you call for help? Call your doctor now or seek immediate medical care if:  ? · You have a fever. ? · The joint is so painful you cannot use it. ? · You have sudden, unexplained swelling, redness, warmth, or severe pain in one or more joints. ? Watch closely for changes in your health, and be sure to contact your doctor if:  ? · You have joint pain. ? · Your symptoms get worse or are not improving after 2 or 3 days. Where can you learn more? Go to http://komal-kameron.info/. Enter K368 in the search box to learn more about \"Gout: Care Instructions. \"  Current as of: October 31, 2016  Content Version: 11.4  © 2280-0380 [employer]. Care instructions adapted under license by [employer] (which disclaims liability or warranty for this information). If you have questions about a medical condition or this instruction, always ask your healthcare professional. [place] [address] any warranty or liability for your use of this information. Purine-Restricted Diet: Care Instructions  Your Care Instructions    Purines are substances that are found in some foods. Your body turns purines into uric acid. High levels of uric acid can cause gout, which is a form of arthritis that causes pain and inflammation in joints. You may be able to help control the amount of uric acid in your body by limiting high-purine foods in your diet. Follow-up care is a key part of your treatment and safety. Be sure to make and go to all appointments, and call your doctor if you are having problems. It's also a good idea to know your test results and keep a list of the medicines you take. How can you care for yourself at home? · Plan your meals and snacks around foods that are low in purines and are safe for you to eat.  These foods include:  ¨ Green vegetables and tomatoes. ¨ Fruits. ¨ Whole-grain breads, rice, and cereals. ¨ Eggs, peanut butter, and nuts. ¨ Low-fat milk, cheese, and other milk products. ¨ Popcorn. ¨ Gelatin desserts, chocolate, cocoa, and cakes and sweets, in small amounts. · You can eat certain foods that are medium-high in purines, but eat them only once in a while. These foods include:  ¨ Legumes, such as dried beans and dried peas. You can have 1 cup cooked legumes each day. ¨ Asparagus, cauliflower, spinach, mushrooms, and green peas. ¨ Fish and seafood (other than very high-purine seafood). ¨ Oatmeal, wheat bran, and wheat germ. · Limit very high-purine foods, including:  ¨ Organ meats, such as liver, kidneys, sweetbreads, and brains. ¨ Meats, including johnson, beef, pork, and lamb. ¨ Game meats and any other meats in large amounts. ¨ Anchovies, sardines, herring, mackerel, and scallops. ¨ Gravy. ¨ Beer. Where can you learn more? Go to http://komal-kameron.info/. Enter F448 in the search box to learn more about \"Purine-Restricted Diet: Care Instructions. \"  Current as of: May 12, 2017  Content Version: 11.4  © 7527-7946 [employer]. Care instructions adapted under license by [employer] (which disclaims liability or warranty for this information). If you have questions about a medical condition or this instruction, always ask your healthcare professional. [place] [address] any warranty or liability for your use of this information.

## 2017-11-14 NOTE — ED PROVIDER NOTES
[facility] [place] [facility]\"" [facility].  EMERGENCY DEPARTMENT HISTORY AND PHYSICAL EXAM         Date of Service: 11/14/2017   Patient Name: Kathy King   YOB: 1970  Medical Record Number: [id number]    History of Presenting Illness     Chief Complaint   Patient presents with    Hand Pain        History Provided By:  patient    Additional History:   Kathy King is a 55 y.o. male with PMhx significant for gout who presents ambulatory to the ED with cc of right 2nd and 3rd finger pain x yesterday. Pt endorses some numbness to the tip of his fingers. He has had episodes of gout in the past in his hand, feet, and knee, but this episode is the worst. He denies any trauma or injury to his right hand. Pt does admit to consuming spicy food and alcohol this weekend. He has tried taking leftover Indomethacin with no significant relief. He denies fever. Pt typically has improvement to symptoms with steroid injections, Percocet, and Colchicine. Pt works as a  and is right hand dominant. Social Hx: - Tobacco, + EtOH, - Illicit Drugs    There are no other complaints, changes or physical findings at this time. Primary Care Provider: None   Specialist:    Past History     Past Medical History:   Past Medical History:   Diagnosis Date    Gout     Hypertension     Other ill-defined conditions(799.89)     gout        Past Surgical History:   Past Surgical History:   Procedure Laterality Date    HX HEENT      eyes    HX ORTHOPAEDIC      right knee petella tendon repair        Family History:   Family History   Problem Relation Age of Onset    Diabetes Neg Hx         Social History:   Social History   Substance Use Topics    Smoking status: Never Smoker    Smokeless tobacco: Never Used    Alcohol use 8.4 oz/week     14 Cans of beer per week        Allergies:   No Known Allergies     Review of Systems   Review of Systems   Constitutional: Negative for chills and fever.    HENT: Negative for congestion, ear pain, rhinorrhea and sore throat. Respiratory: Negative for cough and shortness of breath. Cardiovascular: Negative for chest pain, palpitations and leg swelling. Gastrointestinal: Negative for abdominal pain, constipation, diarrhea, nausea and vomiting. No melena  No hematochezia   Endocrine: Negative for polyuria. Genitourinary: Negative for dysuria, frequency and hematuria. Musculoskeletal: Positive for arthralgias (right finger pain). Neurological: Positive for numbness (right fingers). Negative for dizziness, weakness, light-headedness and headaches. No tingling   All other systems reviewed and are negative. Physical Exam  Physical Exam  General appearance - well nourished, well appearing, and uncomfortable in appearance  Eyes - pupils equal and reactive, extraocular eye movements intact  ENT - mucous membranes moist, pharynx normal without lesions  Neck - supple, no significant adenopathy; non-tender to palpation  Chest - clear to auscultation, no wheezes, rales or rhonchi; non-tender to palpation  Heart - normal rate and regular rhythm, S1 and S2 normal, no murmurs noted, normal pulses, normal capillary refill  Abdomen - soft, nontender, nondistended, no masses or organomegaly  Musculoskeletal - mild erythema and tenderness to right 2nd and 3rd MTP joints with mild swelling  Extremities - peripheral pulses normal, no pedal edema  Skin - normal coloration and turgor, no rashes  Neurological - alert, oriented x3, normal speech, no focal findings or movement disorder noted, normal sensation      Medical Decision Making   I am the first provider for this patient. I reviewed the vital signs, available nursing notes, past medical history, past surgical history, family history and social history. Old Medical Records: Old medical records. Nursing notes. Provider Notes:   DDx: gout     ED Course:  4:54 AM   Initial assessment performed.  The patients presenting problems have been discussed, and they are in agreement with the care plan formulated and outlined with them. I have encouraged them to ask questions as they arise throughout their visit. Progress Note:  5:00 AM  The pt was counseled to avoid spicy foods and EtOH, as these may exacerbate his pain. He conveys his understanding of this . Written by Nancy Lemus ED Scribe, as dictated by Carter Abel MD.    Progress Note:  6:10 AM  The patient was re-evaluated, and he states that his pain has improved with ED treatment. Will discharge at this time. The patient is also requesting a refill of his Atenolol and Lisinopril. Will re-prescribe these medications for him. Written by Nancy Lemus ED Scribe, as dictated by Carter Abel MD.      Vital Signs-Reviewed the patient's vital signs. Patient Vitals for the past 12 hrs:   Temp Pulse Resp BP SpO2   11/14/17 0533 - - - - 98 %   11/14/17 0500 - - - (!) 165/116 99 %   11/14/17 0436 97.6 °F (36.4 °C) 66 16 (!) 162/114 100 %       Medications Given in the ED:  Medications   oxyCODONE-acetaminophen (PERCOCET) 5-325 mg per tablet 2 Tab (2 Tabs Oral Given 11/14/17 0508)   colchicine tablet 0.6 mg (0.6 mg Oral Given 11/14/17 0559)   methylPREDNISolone (PF) (SOLU-MEDROL) injection 125 mg (125 mg IntraMUSCular Given 11/14/17 0508)       Diagnosis:  Clinical Impression:   1. Acute gout of right hand, unspecified cause         Plan:  1:   Follow-up Information     Follow up With Details Comments Contact Info    [facility] EMERGENCY DEPT  If symptoms worsen [address] [place] [address]  [phone]          2:   Current Discharge Medication List      START taking these medications    Details   methylPREDNISolone (MEDROL, KANG,) 4 mg tablet As directed  Qty: 1 Dose Pack, Refills: 0      omeprazole (PRILOSEC) 20 mg capsule Take 1 Cap by mouth daily. Qty: 20 Cap, Refills: 0      colchicine 0.6 mg tablet Take 1 Tab by mouth daily.   Qty: 20 Tab, Refills: 0         CONTINUE these medications which have CHANGED    Details   indomethacin (INDOCIN) 25 mg capsule Take 2 Caps by mouth three (3) times daily. Qty: 30 Cap, Refills: 1      atenolol (TENORMIN) 25 mg tablet Take 1 Tab by mouth daily. Qty: 30 Tab, Refills: 0      lisinopril (PRINIVIL, ZESTRIL) 10 mg tablet Take 1 Tab by mouth daily. Qty: 30 Tab, Refills: 0      oxyCODONE-acetaminophen (PERCOCET) 5-325 mg per tablet Take 1 Tab by mouth every four (4) hours as needed for Pain. Max Daily Amount: 6 Tabs. Qty: 15 Tab, Refills: 0           Return to ED if worse. Disposition:  Discharge Note:  6:10 AM  The pt is ready for discharge. The pt's signs, symptoms, diagnosis, and discharge instructions have been discussed and pt has conveyed their understanding. The pt is to follow up as recommended or return to ER should their symptoms worsen. Plan has been discussed and pt is in agreement. Attestation: This note is prepared by Janiya Clark, acting as Scribe for MD Anni Momin MD: The scribe's documentation has been prepared under my direction and personally reviewed by me in its entirety. I confirm that the note above accurately reflects all work, treatment, procedures, and medical decision making performed by me. This note will not be viewable in [address]

## 2017-11-14 NOTE — LETTER
[address] 
[facility] EMERGENCY DEPT 
[address] [place] [address] [address] [id number] [phone] Work/School Note Date: 11/14/2017 To Whom It May concern: 
 
Kenny Magaña was seen and treated today in the emergency room by the following provider(s): 
Attending Provider: Ibrahima Jaramillo MD. Kenny Magaña should be excused from work on 11/14/2017. Sincerely, Ibrahima Jaramillo MD

## 2018-02-08 ENCOUNTER — APPOINTMENT (OUTPATIENT)
Dept: GENERAL RADIOLOGY | Age: 48
End: 2018-02-08
Attending: PHYSICIAN ASSISTANT
Payer: SELF-PAY

## 2018-02-08 ENCOUNTER — HOSPITAL ENCOUNTER (EMERGENCY)
Age: 48
Discharge: HOME | End: 2018-02-08
Attending: EMERGENCY MEDICINE
Payer: SELF-PAY

## 2018-02-08 VITALS
DIASTOLIC BLOOD PRESSURE: 112 MMHG | SYSTOLIC BLOOD PRESSURE: 161 MMHG | HEART RATE: 101 BPM | BODY MASS INDEX: 30.84 KG/M2 | WEIGHT: 248.02 LBS | HEIGHT: 75 IN | OXYGEN SATURATION: 97 % | RESPIRATION RATE: 20 BRPM | TEMPERATURE: 99.1 F

## 2018-02-08 DIAGNOSIS — R68.89 INFLUENZA-LIKE SYMPTOMS: Primary | ICD-10-CM

## 2018-02-08 DIAGNOSIS — I10 ESSENTIAL HYPERTENSION: ICD-10-CM

## 2018-02-08 PROCEDURE — 71046 X-RAY EXAM CHEST 2 VIEWS: CPT

## 2018-02-08 PROCEDURE — 99282 EMERGENCY DEPT VISIT SF MDM: CPT

## 2018-02-08 RX ORDER — LISINOPRIL 10 MG/1
10 TABLET ORAL DAILY
Qty: 30 TAB | Refills: 0 | Status: SHIPPED | OUTPATIENT
Start: 2018-02-08 | End: 2018-09-18

## 2018-02-08 RX ORDER — ATENOLOL 25 MG/1
25 TABLET ORAL DAILY
Qty: 30 TAB | Refills: 0 | Status: SHIPPED | OUTPATIENT
Start: 2018-02-08 | End: 2018-09-18

## 2018-02-08 RX ORDER — CODEINE PHOSPHATE AND GUAIFENESIN 10; 100 MG/5ML; MG/5ML
5 SOLUTION ORAL
Qty: 120 ML | Refills: 0 | Status: SHIPPED | OUTPATIENT
Start: 2018-02-08 | End: 2018-03-28

## 2018-02-08 RX ORDER — ACETAMINOPHEN 325 MG/1
650 TABLET ORAL
Qty: 20 TAB | Refills: 0 | Status: SHIPPED | OUTPATIENT
Start: 2018-02-08 | End: 2018-03-28

## 2018-02-08 RX ORDER — IBUPROFEN 800 MG/1
800 TABLET ORAL
Qty: 20 TAB | Refills: 0 | Status: SHIPPED | OUTPATIENT
Start: 2018-02-08 | End: 2018-02-15

## 2018-02-08 NOTE — LETTER
[address] 
[facility] EMERGENCY DEPT 
[address] [place] [address] [address] [id number] [phone] Work/School Note Date: 2/8/2018 To Whom It May concern: 
 
Marva Cobb was seen and treated today in the emergency room by the following provider(s): 
Attending Provider: Olga Rodas DO Physician Assistant: EM [place]. Marva Cobb may return to work on [id number]. Sincerely, EM [place]

## 2018-02-08 NOTE — ED PROVIDER NOTES
EMERGENCY DEPARTMENT HISTORY AND PHYSICAL EXAM      Date: 2/8/2018  Patient Name: Harvey Pagan    History of Presenting Illness     Chief Complaint   Patient presents with    Generalized Body Aches     x 2 days    Cough    Chills    Medication Refill     ran out BP meds 2 wks ago    Vomiting     onset yesterday       History Provided By: Patient    HPI: Harvey Pagan, 52 y.o. male with PMHx significant for HTN, presents ambulatory to the ED with cc of intermittent chills, progressively worsening generalized body aches, and a non-productive cough x 3 days. He denies taking any OTC medications for his symptoms. He specifically denies fevers, diarrhea, chest pain, SOB, abdominal pain, or other complaints at this time. There are no other complaints, changes, or physical findings at this time. PCP: None    Current Outpatient Prescriptions   Medication Sig Dispense Refill    atenolol (TENORMIN) 25 mg tablet Take 1 Tab by mouth daily. 30 Tab 0    lisinopril (PRINIVIL, ZESTRIL) 10 mg tablet Take 1 Tab by mouth daily. 30 Tab 0    guaiFENesin-codeine (ROBITUSSIN AC) 100-10 mg/5 mL solution Take 5 mL by mouth three (3) times daily as needed for Cough. Max Daily Amount: 15 mL. 120 mL 0    ibuprofen (MOTRIN) 800 mg tablet Take 1 Tab by mouth every eight (8) hours as needed for Pain for up to 7 days. 20 Tab 0    acetaminophen (TYLENOL) 325 mg tablet Take 2 Tabs by mouth every four (4) hours as needed for Pain.  20 Tab 0       Past History     Past Medical History:  Past Medical History:   Diagnosis Date    Gout     Hypertension     Other ill-defined conditions(439.89)     gout       Past Surgical History:  Past Surgical History:   Procedure Laterality Date    HX HEENT      eyes    HX ORTHOPAEDIC      right knee petella tendon repair       Family History:  Family History   Problem Relation Age of Onset    Diabetes Neg Hx        Social History:  Social History   Substance Use Topics    Smoking status: Never Smoker    Smokeless tobacco: Never Used    Alcohol use 8.4 oz/week     14 Cans of beer per week       Allergies:  No Known Allergies    Review of Systems   Review of Systems   Constitutional: Positive for chills. Negative for fatigue and fever. HENT: Negative for congestion, ear pain and rhinorrhea. Eyes: Negative for pain and redness. Respiratory: Positive for cough. Negative for shortness of breath and wheezing. Cardiovascular: Negative for chest pain and palpitations. Gastrointestinal: Negative for abdominal pain, diarrhea, nausea and vomiting. Genitourinary: Negative for dysuria, frequency and urgency. Musculoskeletal: Positive for myalgias. Negative for back pain, neck pain and neck stiffness. Skin: Negative for rash and wound. Neurological: Negative for weakness, light-headedness, numbness and headaches. Physical Exam   Physical Exam   Constitutional: He is oriented to person, place, and time. He appears well-developed and well-nourished. Non-toxic appearance. No distress. HENT:   Head: Normocephalic and atraumatic. Head is without right periorbital erythema and without left periorbital erythema. Right Ear: External ear normal.   Left Ear: External ear normal.   Nose: Nose normal.   Mouth/Throat: Uvula is midline. No trismus in the jaw. Eyes: Conjunctivae and EOM are normal. Pupils are equal, round, and reactive to light. No scleral icterus. Neck: Normal range of motion and full passive range of motion without pain. Cardiovascular: Normal rate, regular rhythm and normal heart sounds. Pulmonary/Chest: Effort normal and breath sounds normal. No accessory muscle usage. No tachypnea. No respiratory distress. He has no decreased breath sounds. He has no wheezes. Abdominal: Soft. There is no tenderness. There is no rigidity and no guarding. Musculoskeletal: Normal range of motion. Neurological: He is alert and oriented to person, place, and time.  He is not disoriented. No cranial nerve deficit or sensory deficit. GCS eye subscore is 4. GCS verbal subscore is 5. GCS motor subscore is 6. Skin: Skin is intact. No rash noted. Psychiatric: He has a normal mood and affect. His speech is normal.   Nursing note and vitals reviewed. Diagnostic Study Results     Radiologic Studies -   CXR Results  (Last 48 hours)               02/08/18 0939  XR CHEST PA LAT Final result    Impression:  IMPRESSION: No acute process                       Narrative:  EXAM:  XR CHEST PA LAT       INDICATION:   cough; fever       COMPARISON: 2017. FINDINGS: PA and lateral radiographs of the chest demonstrate clear lungs. The   cardiac and mediastinal contours and pulmonary vascularity are normal.  The   bones and soft tissues are within normal limits. Medical Decision Making   I am the first provider for this patient. I reviewed the vital signs, available nursing notes, past medical history, past surgical history, family history and social history. Vital Signs-Reviewed the patient's vital signs. Patient Vitals for the past 12 hrs:   Temp Pulse Resp BP SpO2   02/08/18 0747 99.1 °F (37.3 °C) (!) 101 20 (!) 161/112 97 %       Records Reviewed: Nursing Notes and Old Medical Records    Provider Notes (Medical Decision Making):   DDx: Influenza, PNA, viral illness, bronchitis, URI. ED Course:   Initial assessment performed. The patients presenting problems have been discussed, and they are in agreement with the care plan formulated and outlined with them. I have encouraged them to ask questions as they arise throughout their visit. Progress Note:  9:49 AM  The patient was documented to have an elevated blood pressure reading in the ED. The patient reports that he is prescribed Lisinopril 10 mg and Atenolol 25 mg, but he has recently run out. He notes that he has a follow up appointment at [facility] on 3/21/2018.  Will provide a refill for his medications. Pt was informed of the plan to obtain a cxray to rule out PNA. Will forego influenza screening given duration of time during initial onset of symptoms. Written by CJ Mansfield, as dictated by Dara Henry. Progress Note:  10:36 AM  At time of discharge, the patient was informed of his negative cxray results. Pt conveys his understanding. He was counseled on getting rest, drinking an adequate amount of fluids, and symptomatic treatment. Pt is in agreement with the plan. Written by CJ Mansfield, as dictated by Dara Henry. Critical Care Time: 0 minutes    Discharge Note:  10:40 AM  The pt is ready for discharge. The pt's signs, symptoms, diagnosis, and discharge instructions have been discussed and pt has conveyed their understanding. The pt is to follow up as recommended or return to ER should their symptoms worsen. Plan has been discussed and pt is in agreement. PLAN:  1. Discharge Medication List as of 2/8/2018  9:57 AM      START taking these medications    Details   guaiFENesin-codeine (ROBITUSSIN AC) 100-10 mg/5 mL solution Take 5 mL by mouth three (3) times daily as needed for Cough. Max Daily Amount: 15 mL. , Print, Disp-120 mL, R-0      ibuprofen (MOTRIN) 800 mg tablet Take 1 Tab by mouth every eight (8) hours as needed for Pain for up to 7 days. , Print, Disp-20 Tab, R-0      acetaminophen (TYLENOL) 325 mg tablet Take 2 Tabs by mouth every four (4) hours as needed for Pain., Print, Disp-20 Tab, R-0         CONTINUE these medications which have CHANGED    Details   atenolol (TENORMIN) 25 mg tablet Take 1 Tab by mouth daily. , Print, Disp-30 Tab, R-0      lisinopril (PRINIVIL, ZESTRIL) 10 mg tablet Take 1 Tab by mouth daily. , Print, Disp-30 Tab, R-0           2.    Follow-up Information     Follow up With Details Comments [address] Schedule an appointment as soon as possible for a visit PRIMARY CARE: call to schedule follow up [address]  [phone]        Return to ED if worse     Diagnosis     Clinical Impression:   1. Influenza-like symptoms    2. Essential hypertension        Attestations: This note is prepared by Cathy Mayo, acting as a Scribe for Maninder Hickman. HANK Soriano Course: The scribe's documentation has been prepared under my direction and personally reviewed by me in its entirety. I confirm that the notes above accurately reflects all work, treatment, procedures, and medical decision making performed by me. This note will not be viewable in [address]

## 2018-03-28 ENCOUNTER — HOSPITAL ENCOUNTER (EMERGENCY)
Age: 48
Discharge: HOME | End: 2018-03-28
Attending: EMERGENCY MEDICINE
Payer: SELF-PAY

## 2018-03-28 VITALS
HEIGHT: 75 IN | HEART RATE: 77 BPM | RESPIRATION RATE: 16 BRPM | WEIGHT: 247.36 LBS | DIASTOLIC BLOOD PRESSURE: 112 MMHG | BODY MASS INDEX: 30.76 KG/M2 | TEMPERATURE: 97.8 F | SYSTOLIC BLOOD PRESSURE: 175 MMHG | OXYGEN SATURATION: 100 %

## 2018-03-28 DIAGNOSIS — Z87.39 HISTORY OF GOUT: ICD-10-CM

## 2018-03-28 DIAGNOSIS — J01.10 ACUTE NON-RECURRENT FRONTAL SINUSITIS: ICD-10-CM

## 2018-03-28 DIAGNOSIS — Z76.0 MEDICATION REFILL: ICD-10-CM

## 2018-03-28 DIAGNOSIS — I10 ESSENTIAL HYPERTENSION: Primary | ICD-10-CM

## 2018-03-28 PROCEDURE — 99282 EMERGENCY DEPT VISIT SF MDM: CPT

## 2018-03-28 RX ORDER — METHYLPREDNISOLONE 4 MG/1
TABLET ORAL
Qty: 1 DOSE PACK | Refills: 0 | OUTPATIENT
Start: 2018-03-28 | End: 2019-10-10

## 2018-03-28 RX ORDER — ATENOLOL 25 MG/1
25 TABLET ORAL DAILY
Qty: 14 TAB | Refills: 0 | Status: SHIPPED | OUTPATIENT
Start: 2018-03-28 | End: 2018-09-18

## 2018-03-28 RX ORDER — LISINOPRIL 10 MG/1
10 TABLET ORAL DAILY
Qty: 10 TAB | Refills: 0 | Status: SHIPPED | OUTPATIENT
Start: 2018-03-28 | End: 2018-04-07

## 2018-03-28 RX ORDER — AMOXICILLIN AND CLAVULANATE POTASSIUM 875; 125 MG/1; MG/1
1 TABLET, FILM COATED ORAL 2 TIMES DAILY
Qty: 20 TAB | Refills: 0 | OUTPATIENT
Start: 2018-03-28 | End: 2019-10-10

## 2018-03-28 RX ORDER — CETIRIZINE HCL 10 MG
10 TABLET ORAL DAILY
Qty: 20 TAB | Refills: 0 | Status: SHIPPED | OUTPATIENT
Start: 2018-03-28

## 2018-03-28 RX ORDER — INDOMETHACIN 25 MG/1
25 CAPSULE ORAL 3 TIMES DAILY
Qty: 30 CAP | Refills: 0 | Status: SHIPPED | OUTPATIENT
Start: 2018-03-28 | End: 2018-04-07

## 2018-03-28 NOTE — ED PROVIDER NOTES
EMERGENCY DEPARTMENT HISTORY AND PHYSICAL EXAM      Date: 3/28/2018  Patient Name: Alyssa Gordillo    History of Presenting Illness     Chief Complaint   Patient presents with    Headache     headache intermittent for 2 days, reports sinus sx. Out of BP meds for 10 days. History Provided By: Patient    HPI: Alyssa Gordillo, 52 y.o. male with PMHx significant for gout and HTN, presents ambulatory to the ED with cc of intermittent, mild HA x 2 days with associated congestion and rhinorrhea. Pt denies any use of medications for his symptoms. Additionally, he is asking for a refill of his regularly prescribed medications noting that he missed his last appointment with his PCP; he states that since it was his third missed appointment, he has to re-apply to be followed by the office and his appointment isn't until June. His medications include Atenolol 25 mg, Lisinopril 10 mg, and Indocin 25 mg. He denies any CP, SOB, N/V/D, or weakness. PCP: None    There are no other complaints, changes, or physical findings at this time. Current Outpatient Prescriptions   Medication Sig Dispense Refill    COLCHICINE PO Take  by mouth.  atenolol (TENORMIN) 25 mg tablet Take 1 Tab by mouth daily. 14 Tab 0    lisinopril (PRINIVIL) 10 mg tablet Take 1 Tab by mouth daily for 10 days. 10 Tab 0    amoxicillin-clavulanate (AUGMENTIN) 875-125 mg per tablet Take 1 Tab by mouth two (2) times a day. 20 Tab 0    indomethacin (INDOCIN) 25 mg capsule Take 1 Cap by mouth three (3) times daily for 10 days. With food 30 Cap 0    methylPREDNISolone (MEDROL, KANG,) 4 mg tablet Per Dose Pack instructions 1 Dose Pack 0    cetirizine (ZYRTEC) 10 mg tablet Take 1 Tab by mouth daily. 20 Tab 0    atenolol (TENORMIN) 25 mg tablet Take 1 Tab by mouth daily. 30 Tab 0    lisinopril (PRINIVIL, ZESTRIL) 10 mg tablet Take 1 Tab by mouth daily.  30 Tab 0       Past History     Past Medical History:  Past Medical History:   Diagnosis Date    Gout     Hypertension     Other ill-defined conditions(799.89)     gout       Past Surgical History:  Past Surgical History:   Procedure Laterality Date    HX HEENT      eyes    HX ORTHOPAEDIC      right knee petella tendon repair       Family History:  Family History   Problem Relation Age of Onset    Diabetes Neg Hx        Social History:  Social History   Substance Use Topics    Smoking status: Never Smoker    Smokeless tobacco: Never Used    Alcohol use 8.4 oz/week     14 Cans of beer per week      Comment: \"6pack beer day\"       Allergies:  No Known Allergies      Review of Systems   Review of Systems   Constitutional: Negative for fatigue and fever. HENT: Positive for congestion and rhinorrhea. Negative for ear pain. Eyes: Negative for pain and redness. Respiratory: Negative for cough and wheezing. Cardiovascular: Negative for chest pain and palpitations. Gastrointestinal: Negative for abdominal pain, diarrhea, nausea and vomiting. Genitourinary: Negative for dysuria, frequency and urgency. Musculoskeletal: Negative for back pain, neck pain and neck stiffness. Skin: Negative for rash and wound. Neurological: Positive for headaches. Negative for weakness, light-headedness and numbness. Physical Exam   Physical Exam   Constitutional: He is oriented to person, place, and time. He appears well-developed and well-nourished. Non-toxic appearance. No distress. HENT:   Head: Normocephalic and atraumatic. Head is without right periorbital erythema and without left periorbital erythema. Right Ear: External ear normal.   Left Ear: External ear normal.   Nose: Right sinus exhibits maxillary sinus tenderness and frontal sinus tenderness. Left sinus exhibits maxillary sinus tenderness and frontal sinus tenderness. Mouth/Throat: Uvula is midline. No trismus in the jaw. Nasal congestion. Eyes: Conjunctivae and EOM are normal. Pupils are equal, round, and reactive to light.  No scleral icterus. Neck: Normal range of motion and full passive range of motion without pain. Cardiovascular: Normal rate, regular rhythm and normal heart sounds. Pulmonary/Chest: Effort normal and breath sounds normal. No accessory muscle usage. No tachypnea. No respiratory distress. He has no decreased breath sounds. He has no wheezes. Abdominal: Soft. There is no tenderness. There is no rigidity and no guarding. Musculoskeletal: Normal range of motion. Neurological: He is alert and oriented to person, place, and time. He is not disoriented. No cranial nerve deficit or sensory deficit. GCS eye subscore is 4. GCS verbal subscore is 5. GCS motor subscore is 6. No focal neurological deficits. Skin: Skin is intact. No rash noted. Psychiatric: He has a normal mood and affect. His speech is normal.   Nursing note and vitals reviewed. Medical Decision Making   I am the first provider for this patient. I reviewed the vital signs, available nursing notes, past medical history, past surgical history, family history and social history. Vital Signs-Reviewed the patient's vital signs. Patient Vitals for the past 12 hrs:   Temp Pulse Resp BP SpO2   03/28/18 1001 97.8 °F (36.6 °C) 77 16 (!) 175/112 100 %       Records Reviewed: Nursing Notes and Old Medical Records    Provider Notes (Medical Decision Making):   DDx: Medication noncompliance, HTN, sinusitis, gout, medication refill  Discussed need to establish care with PCP for further management and prescribing of medications. ED Course:   Initial assessment performed. The patients presenting problems have been discussed, and they are in agreement with the care plan formulated and outlined with them. I have encouraged them to ask questions as they arise throughout their visit. 11:03 AM  I have reviewed discharge instructions with the patient and explained medications that he is being discharged with.  The patient verbalized understanding and agrees with plan. Disposition:  Discharge Note:  11:03 AM  The patient has been re-evaluated and is ready for discharge. Reviewed available results with patient. Counseled patient on diagnosis and care plan. Patient has expressed understanding, and all questions have been answered. Patient agrees with plan and agrees to follow up as recommended, or return to the ED if their symptoms worsen. Discharge instructions have been provided and explained to the patient, along with reasons to return to the ED. PLAN:  1. Discharge Medication List as of 3/28/2018 11:01 AM      START taking these medications    Details   !! atenolol (TENORMIN) 25 mg tablet Take 1 Tab by mouth daily. , Print, Disp-14 Tab, R-0      !! lisinopril (PRINIVIL) 10 mg tablet Take 1 Tab by mouth daily for 10 days. , Print, Disp-10 Tab, R-0      amoxicillin-clavulanate (AUGMENTIN) 875-125 mg per tablet Take 1 Tab by mouth two (2) times a day., Print, Disp-20 Tab, R-0      indomethacin (INDOCIN) 25 mg capsule Take 1 Cap by mouth three (3) times daily for 10 days. With food, Print, Disp-30 Cap, R-0      methylPREDNISolone (MEDROL, KANG,) 4 mg tablet Per Dose Pack instructions, Print, Disp-1 Dose Pack, R-0      cetirizine (ZYRTEC) 10 mg tablet Take 1 Tab by mouth daily. , Print, Disp-20 Tab, R-0       !! - Potential duplicate medications found. Please discuss with provider. CONTINUE these medications which have NOT CHANGED    Details   COLCHICINE PO Take  by mouth., Historical Med      !! atenolol (TENORMIN) 25 mg tablet Take 1 Tab by mouth daily. , Print, Disp-30 Tab, R-0      !! lisinopril (PRINIVIL, ZESTRIL) 10 mg tablet Take 1 Tab by mouth daily. , Print, Disp-30 Tab, R-0       !! - Potential duplicate medications found. Please discuss with provider.         2.   Follow-up Information     Follow up With Details Comments [address] [place] [address] Street Schedule an appointment as soon as possible for a visit PRIMARY CARE: call to schedule follow up [phone]  [phone]        Return to ED if worse     Diagnosis     Clinical Impression:   1. Essential hypertension    2. Acute non-recurrent frontal sinusitis    3. History of gout    4. Medication refill        Attestations: This note is prepared by Nathaniel Garcia, acting as Scribe for Dara Henry. HANK Rodney Bears: The scribe's documentation has been prepared under my direction and personally reviewed by me in its entirety. I confirm that the note above accurately reflects all work, treatment, procedures, and medical decision making performed by me.

## 2018-03-28 NOTE — ED NOTES
Received patient to exam room, sitting in a chair in a position of comfort, call bell within reach; pt states he knows his BP is elevated because he feels dizzy,  states he ran out of his meds 10 days ago, pt also reports ongoing sinus congestion, denies fever

## 2018-03-28 NOTE — DISCHARGE INSTRUCTIONS
[facility] SYSTEMS Departments     For adult and child immunizations, family planning, TB screening, STD testing and women's health services. [facility]: [place] [phone]      [facility] 25   [address]   [address]   170 [facility]: [place] [address] [phone]      [address]          [address] [place] [address] [address]     For primary care services, woman and child wellness, and some clinics providing specialty care. [facility] -- [address] [facility]5 [facility] [phone]   411 [facility] 200 [facility] 3614 [facility] [phone]   339 [facility] [address] [facility] [address] [phone] [address] [facility] Machine Talker [address] [place] [address] 5850  Community  [phone]   [address] [phone]   [facility] 81 [facility] [phone]   [facility] [address] [place] [address] [phone]   [facility]: [facility] [address] [facility] [address] [phone]     Eugenio [address] [facility] [address] [phone]   [facility] Outreach 5850  Community  [phone]   [employer]  [address] ([email or web address]) [phone]         Sexual Health/Woman Wellness Clinics    For STD/HIV testing and treatment, pregnancy testing and services, men's health, birth control services, LGBT services, and hepatitis/HPV vaccine services. [employer] for [place] [employer] [address] [facility] [address] [facility] [address] [facility] 1579 [address] [phone]   [facility] [address], 5th floor [phone]   Pregnancy [address] for Women [address]  Shobha Sport [phone]         Specialty Service 1708 [facility]   [phone]   [place]   [phone]   Women, Infant and Children's Services: [address] [phone]       600 [facility]   [phone]   [address]   6959 [facility]     [phone]   [address] Crisis   [address] [facility] [address] [phone]     Local Primary Care Physicians  [facility] [phone]  MD Claire Kaye MD Ingrid Pleva, MD [facility] [phone]  Yvonne Leyva, P  MD Keturah Watts MD Pernell Hazel, MD [address] [facility] [phone]  MD Dre Bonilla [place] [address] [facility] [phone]  MD Morteza Anderson MD Marylyn Griffes, MD Iran Ards, MD   [facility] [phone]  MD Sarah FENTON II, MD Murphy Sicilian, NP [address] [phone]  MD Dirk Prdao MD Nyra Sams, MD Waneta Grammes, MD Alexandra Jones, MD Ulysees Pollack, MD Leila Corns, MD   [address] [facility]  Radha Maynard MD [address] [phone]  MD Autumn Shore, NP  MD Shaye Clark MD April Plain, MD Carolan Jumbo, MD Lisabeth Schmidt, MD   6157 [facility] [phone]  MD Surya Etienne, FNP  Onelia Valdivia, NP  Drucilla Doughty, MD Lowanda Feast, MD Billie Cha, MD Denver Rod, MD [facility] [phone]  MD Cecilio Bateman MD Waldon Pi, MD Maddy Angel MD   [address] [phone]  TaqueriaMD Chelita Cobos MD [place] [phone]  MD Juanita Olvera MD Jeffry Downer Rita Meza, [address] [facility] [phone]  MD Becki Kamara, MD Geremias Hayden, MD Carlin Vargas, MD Princess Jiménez, ROB Gallegos MD 1619 [facility]   [phone]  MD Stef Ness, MD hCris Quijano MD   2102 [facility] [phone]  Nidia Kwon, MD Chandan Roman, FNP  Tabatha Jules, PA-C  Tabatha Jules, FNP  Cally Balderrama, PA-C Gerhardt Sell, MD Andrey Medici, NP   Philip Ryan, DO Miscellaneous:  Cristopher Archer [phone]

## 2018-08-23 ENCOUNTER — DOCUMENTATION ONLY (OUTPATIENT)
Dept: FAMILY MEDICINE CLINIC | Age: 48
End: 2018-08-23

## 2018-08-23 ENCOUNTER — TELEPHONE (OUTPATIENT)
Dept: FAMILY MEDICINE CLINIC | Age: 48
End: 2018-08-23

## 2018-09-18 ENCOUNTER — HOSPITAL ENCOUNTER (EMERGENCY)
Age: 48
Discharge: HOME | End: 2018-09-18
Attending: EMERGENCY MEDICINE
Payer: SELF-PAY

## 2018-09-18 VITALS
OXYGEN SATURATION: 100 % | RESPIRATION RATE: 18 BRPM | TEMPERATURE: 98 F | HEIGHT: 75 IN | HEART RATE: 85 BPM | DIASTOLIC BLOOD PRESSURE: 101 MMHG | SYSTOLIC BLOOD PRESSURE: 153 MMHG | BODY MASS INDEX: 29.69 KG/M2 | WEIGHT: 238.76 LBS

## 2018-09-18 DIAGNOSIS — I10 HYPERTENSION, UNSPECIFIED TYPE: ICD-10-CM

## 2018-09-18 DIAGNOSIS — M10.9 ACUTE GOUT INVOLVING TOE, UNSPECIFIED CAUSE, UNSPECIFIED LATERALITY: Primary | ICD-10-CM

## 2018-09-18 PROCEDURE — 74011636637 HC RX REV CODE- 636/637: Performed by: EMERGENCY MEDICINE

## 2018-09-18 PROCEDURE — 99283 EMERGENCY DEPT VISIT LOW MDM: CPT

## 2018-09-18 PROCEDURE — 74011250637 HC RX REV CODE- 250/637: Performed by: EMERGENCY MEDICINE

## 2018-09-18 RX ORDER — LISINOPRIL 5 MG/1
10 TABLET ORAL
Status: COMPLETED | OUTPATIENT
Start: 2018-09-18 | End: 2018-09-18

## 2018-09-18 RX ORDER — OXYCODONE AND ACETAMINOPHEN 5; 325 MG/1; MG/1
1 TABLET ORAL
Status: COMPLETED | OUTPATIENT
Start: 2018-09-18 | End: 2018-09-18

## 2018-09-18 RX ORDER — NAPROXEN 500 MG/1
500 TABLET ORAL
Qty: 20 TAB | Refills: 0 | Status: SHIPPED | OUTPATIENT
Start: 2018-09-18 | End: 2018-09-19

## 2018-09-18 RX ORDER — ATENOLOL 25 MG/1
25 TABLET ORAL DAILY
Qty: 30 TAB | Refills: 0 | Status: SHIPPED | OUTPATIENT
Start: 2018-09-18 | End: 2018-09-19

## 2018-09-18 RX ORDER — PREDNISONE 10 MG/1
40 TABLET ORAL
Status: COMPLETED | OUTPATIENT
Start: 2018-09-18 | End: 2018-09-18

## 2018-09-18 RX ORDER — LISINOPRIL 10 MG/1
10 TABLET ORAL DAILY
Qty: 10 TAB | Refills: 0 | Status: SHIPPED | OUTPATIENT
Start: 2018-09-18 | End: 2018-09-19

## 2018-09-18 RX ORDER — NAPROXEN 250 MG/1
500 TABLET ORAL
Status: COMPLETED | OUTPATIENT
Start: 2018-09-18 | End: 2018-09-18

## 2018-09-18 RX ORDER — OXYCODONE AND ACETAMINOPHEN 5; 325 MG/1; MG/1
1 TABLET ORAL
Qty: 10 TAB | Refills: 0 | OUTPATIENT
Start: 2018-09-18 | End: 2019-10-10

## 2018-09-18 RX ORDER — PREDNISONE 20 MG/1
40 TABLET ORAL DAILY
Qty: 10 TAB | Refills: 0 | Status: SHIPPED | OUTPATIENT
Start: 2018-09-18 | End: 2018-09-19

## 2018-09-18 RX ORDER — ATENOLOL 25 MG/1
25 TABLET ORAL ONCE
Status: COMPLETED | OUTPATIENT
Start: 2018-09-18 | End: 2018-09-18

## 2018-09-18 RX ADMIN — ATENOLOL 25 MG: 25 TABLET ORAL at 22:02

## 2018-09-18 RX ADMIN — OXYCODONE HYDROCHLORIDE AND ACETAMINOPHEN 1 TABLET: 5; 325 TABLET ORAL at 21:51

## 2018-09-18 RX ADMIN — PREDNISONE 40 MG: 10 TABLET ORAL at 21:51

## 2018-09-18 RX ADMIN — LISINOPRIL 10 MG: 5 TABLET ORAL at 22:00

## 2018-09-18 RX ADMIN — NAPROXEN 500 MG: 250 TABLET ORAL at 21:51

## 2018-09-19 RX ORDER — LISINOPRIL 10 MG/1
10 TABLET ORAL DAILY
Qty: 10 TAB | Refills: 0 | Status: SHIPPED | OUTPATIENT
Start: 2018-09-19 | End: 2018-09-29

## 2018-09-19 RX ORDER — NAPROXEN 500 MG/1
500 TABLET ORAL
Qty: 20 TAB | Refills: 0 | OUTPATIENT
Start: 2018-09-19 | End: 2019-10-10

## 2018-09-19 RX ORDER — ATENOLOL 25 MG/1
25 TABLET ORAL DAILY
Qty: 30 TAB | Refills: 0 | Status: SHIPPED | OUTPATIENT
Start: 2018-09-19 | End: 2019-10-10

## 2018-09-19 RX ORDER — PREDNISONE 20 MG/1
40 TABLET ORAL DAILY
Qty: 10 TAB | Refills: 0 | Status: SHIPPED | OUTPATIENT
Start: 2018-09-19 | End: 2018-09-29

## 2018-09-19 NOTE — DISCHARGE INSTRUCTIONS
Gout: Care Instructions  Your Care Instructions    Gout is a form of arthritis caused by a buildup of uric acid crystals in a joint. It causes sudden attacks of pain, swelling, redness, and stiffness, usually in one joint, especially the big toe. Gout usually comes on without a cause. But it can be brought on by drinking alcohol (especially beer) or eating seafood and red meat. Taking certain medicines, such as diuretics or aspirin, also can bring on an attack of gout. Taking your medicines as prescribed and following up with your doctor regularly can help you avoid gout attacks in the future. Follow-up care is a key part of your treatment and safety. Be sure to make and go to all appointments, and call your doctor if you are having problems. It's also a good idea to know your test results and keep a list of the medicines you take. How can you care for yourself at home? · If the joint is swollen, put ice or a cold pack on the area for 10 to 20 minutes at a time. Put a thin cloth between the ice and your skin. · Prop up the sore limb on a pillow when you ice it or anytime you sit or lie down during the next 3 days. Try to keep it above the level of your heart. This will help reduce swelling. · Rest sore joints. Avoid activities that put weight or strain on the joints for a few days. Take short rest breaks from your regular activities during the day. · Take your medicines exactly as prescribed. Call your doctor if you think you are having a problem with your medicine. · Take pain medicines exactly as directed. ¨ If the doctor gave you a prescription medicine for pain, take it as prescribed. ¨ If you are not taking a prescription pain medicine, ask your doctor if you can take an over-the-counter medicine. · Eat less seafood and red meat. · Check with your doctor before drinking alcohol. · Losing weight, if you are overweight, may help reduce attacks of gout. But do not go on a [employer]. \" Losing a lot of weight in a short amount of time can cause a gout attack. When should you call for help? Call your doctor now or seek immediate medical care if:    · You have a fever.     · The joint is so painful you cannot use it.     · You have sudden, unexplained swelling, redness, warmth, or severe pain in one or more joints.    Watch closely for changes in your health, and be sure to contact your doctor if:    · You have joint pain.     · Your symptoms get worse or are not improving after 2 or 3 days. Where can you learn more? Go to http://komal-kameron.info/. Enter J511 in the search box to learn more about \"Gout: Care Instructions. \"  Current as of: October 10, 2017  Content Version: 11.7  © 8346-5856 [employer]. Care instructions adapted under license by [employer] (which disclaims liability or warranty for this information). If you have questions about a medical condition or this instruction, always ask your healthcare professional. [place] [address] any warranty or liability for your use of this information.

## 2018-09-19 NOTE — ED PROVIDER NOTES
EMERGENCY DEPARTMENT HISTORY AND PHYSICAL EXAM      Date: 9/18/2018  Patient Name: Monalisa Mayorga    History of Presenting Illness     Chief Complaint   Patient presents with    Toe Pain     Patient ambulatory to triage with slow steady gait and complain of right great toe pain and states \"Its the gout\"       History Provided By: Patient    HPI: Monalisa Mayorga, 52 y.o. male with PMHx significant for gout, HTN presents ambulatory to the ED with cc of constant right 1st toe pain, redness,  and swelling for the past 2 days. Patient states current symptoms are similar to past episodes of gout. He denies fever, chills, or pain to any other joints. Sx have been constant and worse with ambulation      There are no other complaints, changes, or physical findings at this time. PCP: Leidy Cabello MD    Current Facility-Administered Medications   Medication Dose Route Frequency Provider Last Rate Last Dose    oxyCODONE-acetaminophen (PERCOCET) 5-325 mg per tablet 1 Tab  1 Tab Oral NOW Arsen Pace MD        naproxen (NAPROSYN) tablet 500 mg  500 mg Oral NOW Arsen Pace MD        predniSONE (DELTASONE) tablet 40 mg  40 mg Oral NOW Arsen Pace MD         Current Outpatient Prescriptions   Medication Sig Dispense Refill    oxyCODONE-acetaminophen (PERCOCET) 5-325 mg per tablet Take 1 Tab by mouth every eight (8) hours as needed for Pain. Max Daily Amount: 3 Tabs. Indications: Pain 10 Tab 0    naproxen (NAPROSYN) 500 mg tablet Take 1 Tab by mouth every twelve (12) hours as needed for Pain. 20 Tab 0    predniSONE (DELTASONE) 20 mg tablet Take 2 Tabs by mouth daily for 10 days. With Breakfast 10 Tab 0    COLCHICINE PO Take  by mouth.  atenolol (TENORMIN) 25 mg tablet Take 1 Tab by mouth daily. 14 Tab 0    amoxicillin-clavulanate (AUGMENTIN) 875-125 mg per tablet Take 1 Tab by mouth two (2) times a day.  20 Tab 0    methylPREDNISolone (MEDROL, KANG,) 4 mg tablet Per Dose Pack instructions 1 Dose Pack 0    cetirizine (ZYRTEC) 10 mg tablet Take 1 Tab by mouth daily. 20 Tab 0    atenolol (TENORMIN) 25 mg tablet Take 1 Tab by mouth daily. 30 Tab 0    lisinopril (PRINIVIL, ZESTRIL) 10 mg tablet Take 1 Tab by mouth daily. 30 Tab 0       Past History     Past Medical History:  Past Medical History:   Diagnosis Date    Gout     Hypertension     Other ill-defined conditions(799.89)     gout       Past Surgical History:  Past Surgical History:   Procedure Laterality Date    HX HEENT      eyes    HX ORTHOPAEDIC      right knee petella tendon repair       Family History:  Family History   Problem Relation Age of Onset    Diabetes Neg Hx        Social History:  Social History   Substance Use Topics    Smoking status: Never Smoker    Smokeless tobacco: Never Used    Alcohol use 8.4 oz/week     14 Cans of beer per week      Comment: \"6pack beer day\"       Allergies:  No Known Allergies      Review of Systems   Review of Systems   Constitutional: Negative for chills, fatigue and fever. HENT: Negative for congestion, ear pain and rhinorrhea. Eyes: Negative for pain and visual disturbance. Respiratory: Negative for cough and shortness of breath. Cardiovascular: Negative for chest pain and leg swelling. Gastrointestinal: Negative for abdominal pain, diarrhea, nausea and vomiting. Genitourinary: Negative for dysuria and flank pain. Musculoskeletal: Positive for arthralgias and joint swelling. Negative for back pain and neck pain. Skin: Negative for rash and wound. Neurological: Negative for dizziness, syncope and headaches. Psychiatric/Behavioral: Negative for self-injury and suicidal ideas. Physical Exam   Physical Exam     GENERAL: alert and oriented, no acute distress  EYES: PEERL, No injection, discharge or icterus. ENT: Mucous membranes pink and moist.  NECK: Supple  LUNGS: Airway patent. Non-labored respirations. Breath sounds clear with good air entry bilaterally.   HEART: Regular rate and rhythm. No peripheral edema  ABDOMEN: Non-distended and non-tender, without guarding or rebound. SKIN:  warm, dry  EXTREMITIES: no deformity, symmetric with normal ROM. Right 1st MTP joint is swollen, warm, erythematous, and tender  NEUROLOGICAL: Alert, oriented      Diagnostic Study Results     Labs -   No results found for this or any previous visit (from the past 12 hour(s)). Radiologic Studies -   No orders to display     CT Results  (Last 48 hours)    None        CXR Results  (Last 48 hours)    None            Medical Decision Making   I am the first provider for this patient. I reviewed the vital signs, available nursing notes, past medical history, past surgical history, family history and social history. Vital Signs-Reviewed the patient's vital signs. Patient Vitals for the past 12 hrs:   Temp Pulse Resp BP SpO2   09/18/18 1857 98 °F (36.7 °C) 83 17 (!) 195/127 100 %       Pulse Oximetry Analysis - 100% on RA    Cardiac Monitor:   Rate: 83 bpm  Rhythm: Normal Sinus Rhythm    . Records Reviewed: Nursing Notes and Old Medical Records    Provider Notes (Medical Decision Making): On presentation the patient is well appearing, in no acute distress with normal vital signs. Based on the history and exam the differential diagnosis for this patient includes gout, pseudogout, septic arthritis, cellulitis. Patient has hx of gout flares in the affected MTP with similar presentations so feel that gout flare most likely. There was no trauma to the area so will not obtain xray. He is having no systemic symptoms and otherwise lower suspicion for septic arthritis or cellulitis and otherwise his clinical presentation makes these diagnosis less likely. Will treat for gout flare. The nature of gout is fully explained, including dietary relationship, acute and interval phase and treatment of both. Long term complications such as kidney stones, tophi and arthritis are discussed.  Avoidance of alcohol recommended, and written literature is given along with a low purine diet. Proper use of NSAIDS for acute attacks discussed, and its side effects. Will also treat with short course of steroids as this has helped him in the past and short course of percocet. Follow up with PCP or ED if further attacks occur, or this one does not resolve promptly or you develop fevers, chills or inability to ambulate. .        ED Course:   Initial assessment performed. The patients presenting problems have been discussed, and they are in agreement with the care plan formulated and outlined with them. I have encouraged them to ask questions as they arise throughout their visit. Critical Care Time:   0 minutes    Disposition:  DISCHARGE NOTE:  9:36 PM  The patient is ready for discharge. The patients signs, symptoms, diagnosis, and instructions for discharge have been discussed and the pt has conveyed their understanding. The patient is to follow up as recommended or return to the ER should their symptoms worsen. Plan has been discussed and patient has conveyed their agreement. PLAN:  1. Current Discharge Medication List      START taking these medications    Details   oxyCODONE-acetaminophen (PERCOCET) 5-325 mg per tablet Take 1 Tab by mouth every eight (8) hours as needed for Pain. Max Daily Amount: 3 Tabs. Indications: Pain  Qty: 10 Tab, Refills: 0    Associated Diagnoses: Acute gout involving toe, unspecified cause, unspecified laterality      naproxen (NAPROSYN) 500 mg tablet Take 1 Tab by mouth every twelve (12) hours as needed for Pain. Qty: 20 Tab, Refills: 0      predniSONE (DELTASONE) 20 mg tablet Take 2 Tabs by mouth daily for 10 days. With Breakfast  Qty: 10 Tab, Refills: 0           2.    Follow-up Information     Follow up With Details Comments Contact Info    Phys Other, MD Schedule an appointment as soon as possible for a visit in 2 days  Patient can only remember the practice name and not the physician Return to ED if worse     Diagnosis     Clinical Impression:   1. Acute gout involving toe, unspecified cause, unspecified laterality        Attestations: This note is prepared by Bianca Amezcua, acting as Scribe for [employer]. MD Krystian Perez MD: The scribe's documentation has been prepared under my direction and personally reviewed by me in its entirety. I confirm that the note above accurately reflects all work, treatment, procedures, and medical decision making performed by me.

## 2019-10-10 ENCOUNTER — HOSPITAL ENCOUNTER (EMERGENCY)
Age: 49
Discharge: HOME | End: 2019-10-10
Attending: EMERGENCY MEDICINE
Payer: SELF-PAY

## 2019-10-10 VITALS
HEIGHT: 75 IN | OXYGEN SATURATION: 100 % | SYSTOLIC BLOOD PRESSURE: 183 MMHG | WEIGHT: 249.56 LBS | BODY MASS INDEX: 31.03 KG/M2 | DIASTOLIC BLOOD PRESSURE: 115 MMHG | RESPIRATION RATE: 16 BRPM | HEART RATE: 79 BPM | TEMPERATURE: 97.6 F

## 2019-10-10 DIAGNOSIS — M54.12 CERVICAL RADICULOPATHY: Primary | ICD-10-CM

## 2019-10-10 DIAGNOSIS — I10 ESSENTIAL HYPERTENSION: ICD-10-CM

## 2019-10-10 LAB
ATRIAL RATE: 75 BPM
CALCULATED P AXIS, ECG09: -8 DEGREES
CALCULATED R AXIS, ECG10: -14 DEGREES
CALCULATED T AXIS, ECG11: -33 DEGREES
DIAGNOSIS, 93000: NORMAL
P-R INTERVAL, ECG05: 184 MS
Q-T INTERVAL, ECG07: 386 MS
QRS DURATION, ECG06: 92 MS
QTC CALCULATION (BEZET), ECG08: 431 MS
VENTRICULAR RATE, ECG03: 75 BPM

## 2019-10-10 PROCEDURE — 99283 EMERGENCY DEPT VISIT LOW MDM: CPT

## 2019-10-10 PROCEDURE — 96372 THER/PROPH/DIAG INJ SC/IM: CPT

## 2019-10-10 PROCEDURE — 74011250636 HC RX REV CODE- 250/636: Performed by: EMERGENCY MEDICINE

## 2019-10-10 PROCEDURE — 93005 ELECTROCARDIOGRAM TRACING: CPT

## 2019-10-10 RX ORDER — ATENOLOL 25 MG/1
25 TABLET ORAL DAILY
Qty: 30 TAB | Refills: 2 | Status: SHIPPED | OUTPATIENT
Start: 2019-10-10 | End: 2019-12-07

## 2019-10-10 RX ORDER — NAPROXEN 500 MG/1
500 TABLET ORAL
Qty: 20 TAB | Refills: 0 | Status: SHIPPED | OUTPATIENT
Start: 2019-10-10 | End: 2020-10-06

## 2019-10-10 RX ORDER — METHOCARBAMOL 500 MG/1
500 TABLET, FILM COATED ORAL 4 TIMES DAILY
Qty: 20 TAB | Refills: 0 | Status: SHIPPED | OUTPATIENT
Start: 2019-10-10 | End: 2020-10-06

## 2019-10-10 RX ORDER — LISINOPRIL 10 MG/1
10 TABLET ORAL DAILY
COMMUNITY
End: 2019-12-07

## 2019-10-10 RX ORDER — LISINOPRIL 10 MG/1
20 TABLET ORAL DAILY
Qty: 60 TAB | Refills: 0 | Status: SHIPPED | OUTPATIENT
Start: 2019-10-10 | End: 2019-11-09

## 2019-10-10 RX ORDER — KETOROLAC TROMETHAMINE 30 MG/ML
30 INJECTION, SOLUTION INTRAMUSCULAR; INTRAVENOUS
Status: COMPLETED | OUTPATIENT
Start: 2019-10-10 | End: 2019-10-10

## 2019-10-10 RX ORDER — METHYLPREDNISOLONE 4 MG/1
TABLET ORAL
Qty: 1 DOSE PACK | Refills: 0 | OUTPATIENT
Start: 2019-10-10 | End: 2019-12-07

## 2019-10-10 RX ADMIN — KETOROLAC TROMETHAMINE 30 MG: 30 INJECTION, SOLUTION INTRAMUSCULAR at 07:50

## 2019-10-10 NOTE — ED NOTES
Pt states that he has had pain in his right shoulder that radiates down his arm. His distal arm and hands feel tingle and numbness. He states that he is a . Has not been on his bp medicines due to change in job and no insurance established. Pt states taht he feels SOB but no chest pain or other s/sx.

## 2019-10-10 NOTE — LETTER
[address] 
[facility] EMERGENCY DEPT 
[address] [place] [address] Ariana Manzanares [address] 
[phone] Work/School Note Date: 10/10/2019 To Whom It May concern: 
 
Eusebio Chavez was seen and treated today in the emergency room by the following provider(s): 
Attending Provider: Sonia Brown MD. Eusebio Chavez may return to work on 10/12. Sincerely, Naheed Crook MD

## 2019-10-10 NOTE — ED NOTES
Dr Elmer Santiago reviewed discharge instructions with the patient. The patient verbalized understanding. All questions and concerns were addressed. The patient declined a wheelchair and is discharged ambulatory in the care of family members with instructions and prescriptions in hand. Pt is alert and oriented x 4. Respirations are clear and unlabored.

## 2019-10-10 NOTE — ED PROVIDER NOTES
EMERGENCY DEPARTMENT HISTORY AND PHYSICAL EXAM      Date: 10/10/2019  Patient Name: Sharla Askew  Patient Age and Sex: 50 y.o. male     History of Presenting Illness     Chief Complaint   Patient presents with    Neck Pain     Ambulatory c/o R side neck pain radiating into R shoulder and \"tingling\" to R arm/hands/fingers ongoing x 3 weeks Pt reports he ran out of BP medications approx month ago but states symptoms were happening while on BP medication       History Provided By: Patient    HPI: Sharla Askew is a 42-year-old male with a history of hypertension presenting for neck pain. Patient states that he is a  and for the past 3 weeks has been having right-sided neck pain radiating down his right arm. States that he is now having numbness over that right arm and in the right hand. Patient states that he does work long hours as a  and does have his arm up most the time. Denies any chest pain, shortness of breath but his symptoms are associated with some dizziness at times. Patient also states that he has run out of his blood pressure medications and because he does not have insurance has not been able to afford paying for new ones. There are no other complaints, changes, or physical findings at this time. PCP: Other, MD Leiyd    No current facility-administered medications on file prior to encounter. Current Outpatient Medications on File Prior to Encounter   Medication Sig Dispense Refill    lisinopril (PRINIVIL, ZESTRIL) 10 mg tablet Take 10 mg by mouth daily.  [DISCONTINUED] naproxen (NAPROSYN) 500 mg tablet Take 1 Tab by mouth every twelve (12) hours as needed for Pain. 20 Tab 0    [DISCONTINUED] atenolol (TENORMIN) 25 mg tablet Take 1 Tab by mouth daily. 30 Tab 0    [DISCONTINUED] oxyCODONE-acetaminophen (PERCOCET) 5-325 mg per tablet Take 1 Tab by mouth every eight (8) hours as needed for Pain. Max Daily Amount: 3 Tabs.  Indications: Pain 10 Tab 0    COLCHICINE PO Take  by mouth.  cetirizine (ZYRTEC) 10 mg tablet Take 1 Tab by mouth daily. 20 Tab 0    [DISCONTINUED] amoxicillin-clavulanate (AUGMENTIN) 875-125 mg per tablet Take 1 Tab by mouth two (2) times a day. 20 Tab 0    [DISCONTINUED] methylPREDNISolone (MEDROL, KANG,) 4 mg tablet Per Dose Pack instructions 1 Dose Pack 0       Past History     Past Medical History:  Past Medical History:   Diagnosis Date    Gout     Hypertension     Other ill-defined conditions(799.89)     gout       Past Surgical History:  Past Surgical History:   Procedure Laterality Date    HX HEENT      eyes    HX ORTHOPAEDIC      right knee petella tendon repair       Family History:  Family History   Problem Relation Age of Onset    Diabetes Neg Hx        Social History:  Social History     Tobacco Use    Smoking status: Never Smoker    Smokeless tobacco: Never Used   Substance Use Topics    Alcohol use: Yes     Alcohol/week: 14.0 standard drinks     Types: 14 Cans of beer per week     Comment: \"6pack beer day\"    Drug use: No       Allergies:  No Known Allergies      Review of Systems   Review of Systems   Constitutional: Negative for chills and fever. Respiratory: Negative for cough and shortness of breath. Cardiovascular: Negative for chest pain. Gastrointestinal: Negative for abdominal pain, constipation, diarrhea, nausea and vomiting. Musculoskeletal: Positive for arthralgias, myalgias and neck pain. Neurological: Positive for dizziness. Negative for weakness and numbness. All other systems reviewed and are negative. Physical Exam   Physical Exam   Constitutional: He is oriented to person, place, and time. He appears well-developed and well-nourished. HENT:   Head: Normocephalic and atraumatic. Eyes: Conjunctivae and EOM are normal.   Neck: Normal range of motion. Neck supple. Cardiovascular: Normal rate and regular rhythm.    Pulmonary/Chest: Effort normal and breath sounds normal. No respiratory distress. Abdominal: Soft. He exhibits no distension. There is no tenderness. Musculoskeletal: Normal range of motion. Able to range the entire right shoulder without any difficulty. However when rotating externally, pain elicited in the neck and shoulder causing shooting pains down his arm. 5 out of 5 strength in all the joints. Neurological: He is alert and oriented to person, place, and time. Skin: Skin is warm and dry. Psychiatric: He has a normal mood and affect. Nursing note and vitals reviewed. Diagnostic Study Results     Labs -     Recent Results (from the past 12 hour(s))   EKG, 12 LEAD, INITIAL    Collection Time: 10/10/19  7:41 AM   Result Value Ref Range    Ventricular Rate 75 BPM    Atrial Rate 75 BPM    P-R Interval 184 ms    QRS Duration 92 ms    Q-T Interval 386 ms    QTC Calculation (Bezet) 431 ms    Calculated P Axis -8 degrees    Calculated R Axis -14 degrees    Calculated T Axis -33 degrees    Diagnosis       Normal sinus rhythm  Minimal voltage criteria for LVH, may be normal variant  Cannot rule out Anterior infarct , age undetermined  When compared with ECG of 22-JAN-2017 17:11,  Minimal criteria for Anterior infarct are now present         Radiologic Studies -   No orders to display     CT Results  (Last 48 hours)    None        CXR Results  (Last 48 hours)    None            Medical Decision Making   I am the first provider for this patient. I reviewed the vital signs, available nursing notes, past medical history, past surgical history, family history and social history. Vital Signs-Reviewed the patient's vital signs. Patient Vitals for the past 12 hrs:   Temp Pulse Resp BP SpO2   10/10/19 0730 97.6 °F (36.4 °C) 79 16 (!) 183/115 100 %       Records Reviewed: Nursing Notes and Old Medical Records    Provider Notes (Medical Decision Making):   Patient presenting with neck pain shooting down his arm as well as numbness.   Most likely cervical radiculopathy. Will get EKG given he is hypertensive here, but denies any chest pain and unlikely ACS. He has not been taking his blood pressure medications so more likely due to noncompliance. Will treat with Solu-Medrol, naproxen, Robaxin and give prescriptions for blood pressure medications for $4 at [facility] and free at [facility] [place]. ED Course:   Initial assessment performed. The patients presenting problems have been discussed, and they are in agreement with the care plan formulated and outlined with them. I have encouraged them to ask questions as they arise throughout their visit. Critical Care Time:   0    Disposition:  Discharge Note:  The patient has been re-evaluated and is ready for discharge. Reviewed available results with patient. Counseled patient on diagnosis and care plan. Patient has expressed understanding, and all questions have been answered. Patient agrees with plan and agrees to follow up as recommended, or to return to the ED if their symptoms worsen. Discharge instructions have been provided and explained to the patient, along with reasons to return to the ED. PLAN:  Discharge Medication List as of 10/10/2019  7:52 AM      START taking these medications    Details   methocarbamol (ROBAXIN) 500 mg tablet Take 1 Tab by mouth four (4) times daily. , Normal, Disp-20 Tab, R-0      !! lisinopril (PRINIVIL) 10 mg tablet Take 2 Tabs by mouth daily for 30 days. , Print, Disp-60 Tab, R-0       !! - Potential duplicate medications found. Please discuss with provider. CONTINUE these medications which have CHANGED    Details   methylPREDNISolone (MEDROL, KANG,) 4 mg tablet Take as directed, Normal, Disp-1 Dose Pack, R-0      naproxen (NAPROSYN) 500 mg tablet Take 1 Tab by mouth every twelve (12) hours as needed for Pain., Normal, Disp-20 Tab, R-0      atenolol (TENORMIN) 25 mg tablet Take 1 Tab by mouth daily. , Normal, Disp-30 Tab, R-2         CONTINUE these medications which have NOT CHANGED    Details   !! lisinopril (PRINIVIL, ZESTRIL) 10 mg tablet Take 10 mg by mouth daily. , Historical Med      COLCHICINE PO Take  by mouth., Historical Med      cetirizine (ZYRTEC) 10 mg tablet Take 1 Tab by mouth daily. , Print, Disp-20 Tab, R-0       !! - Potential duplicate medications found. Please discuss with provider. STOP taking these medications       oxyCODONE-acetaminophen (PERCOCET) 5-325 mg per tablet Comments:   Reason for Stopping:         amoxicillin-clavulanate (AUGMENTIN) 875-125 mg per tablet Comments:   Reason for Stoppin.   Follow-up Information     Follow up With Specialties Details Why Contact Info    [facility] EMERGENCY DEPT Emergency Medicine  If symptoms worsen [address]  [address]   [address] [address] [phone]        3. Return to ED if worse     Diagnosis     Clinical Impression:   1. Cervical radiculopathy    2. Essential hypertension        Attestations:    Orquidea Warren M.D. Please note that this dictation was completed with [employer], the computer voice recognition software. Quite often unanticipated grammatical, syntax, homophones, and other interpretive errors are inadvertently transcribed by the computer software. Please disregard these errors. Please excuse any errors that have escaped final proofreading. Thank you.

## 2019-12-07 ENCOUNTER — HOSPITAL ENCOUNTER (EMERGENCY)
Age: 49
Discharge: HOME | End: 2019-12-07
Attending: EMERGENCY MEDICINE
Payer: SELF-PAY

## 2019-12-07 VITALS
BODY MASS INDEX: 30.46 KG/M2 | OXYGEN SATURATION: 100 % | RESPIRATION RATE: 18 BRPM | TEMPERATURE: 97.7 F | SYSTOLIC BLOOD PRESSURE: 161 MMHG | HEIGHT: 75 IN | WEIGHT: 245 LBS | HEART RATE: 79 BPM | DIASTOLIC BLOOD PRESSURE: 102 MMHG

## 2019-12-07 DIAGNOSIS — I10 ESSENTIAL HYPERTENSION: ICD-10-CM

## 2019-12-07 DIAGNOSIS — Z76.0 MEDICATION REFILL: ICD-10-CM

## 2019-12-07 DIAGNOSIS — M10.9 ACUTE GOUT OF LEFT ELBOW, UNSPECIFIED CAUSE: ICD-10-CM

## 2019-12-07 DIAGNOSIS — M10.9 ACUTE GOUT OF RIGHT FOOT, UNSPECIFIED CAUSE: Primary | ICD-10-CM

## 2019-12-07 PROCEDURE — 74011250636 HC RX REV CODE- 250/636: Performed by: PHYSICIAN ASSISTANT

## 2019-12-07 PROCEDURE — 99282 EMERGENCY DEPT VISIT SF MDM: CPT

## 2019-12-07 RX ORDER — ATENOLOL 25 MG/1
25 TABLET ORAL DAILY
Qty: 30 TAB | Refills: 0 | Status: SHIPPED | OUTPATIENT
Start: 2019-12-07 | End: 2021-01-06 | Stop reason: SDUPTHER

## 2019-12-07 RX ORDER — INDOMETHACIN 25 MG/1
25 CAPSULE ORAL 3 TIMES DAILY
Qty: 30 CAP | Refills: 0 | Status: SHIPPED | OUTPATIENT
Start: 2019-12-07 | End: 2019-12-17

## 2019-12-07 RX ORDER — OXYCODONE AND ACETAMINOPHEN 7.5; 325 MG/1; MG/1
1 TABLET ORAL
Qty: 10 TAB | Refills: 0 | Status: SHIPPED | OUTPATIENT
Start: 2019-12-07 | End: 2019-12-12

## 2019-12-07 RX ORDER — LISINOPRIL 10 MG/1
10 TABLET ORAL DAILY
Qty: 30 TAB | Refills: 0 | Status: SHIPPED | OUTPATIENT
Start: 2019-12-07 | End: 2021-01-06 | Stop reason: SDUPTHER

## 2019-12-07 RX ORDER — METHYLPREDNISOLONE 4 MG/1
TABLET ORAL
Qty: 1 DOSE PACK | Refills: 0 | Status: SHIPPED | OUTPATIENT
Start: 2019-12-07 | End: 2020-10-06

## 2019-12-07 RX ADMIN — METHYLPREDNISOLONE SODIUM SUCCINATE 125 MG: 125 INJECTION, POWDER, FOR SOLUTION INTRAMUSCULAR; INTRAVENOUS at 18:21

## 2019-12-07 NOTE — DISCHARGE INSTRUCTIONS
Patient Education        Gout: Care Instructions  Your Care Instructions    Gout is a form of arthritis caused by a buildup of uric acid crystals in a joint. It causes sudden attacks of pain, swelling, redness, and stiffness, usually in one joint, especially the big toe. Gout usually comes on without a cause. But it can be brought on by drinking alcohol (especially beer) or eating seafood and red meat. Taking certain medicines, such as diuretics or aspirin, also can bring on an attack of gout. Taking your medicines as prescribed and following up with your doctor regularly can help you avoid gout attacks in the future. Follow-up care is a key part of your treatment and safety. Be sure to make and go to all appointments, and call your doctor if you are having problems. It's also a good idea to know your test results and keep a list of the medicines you take. How can you care for yourself at home? · If the joint is swollen, put ice or a cold pack on the area for 10 to 20 minutes at a time. Put a thin cloth between the ice and your skin. · Prop up the sore limb on a pillow when you ice it or anytime you sit or lie down during the next 3 days. Try to keep it above the level of your heart. This will help reduce swelling. · Rest sore joints. Avoid activities that put weight or strain on the joints for a few days. Take short rest breaks from your regular activities during the day. · Take your medicines exactly as prescribed. Call your doctor if you think you are having a problem with your medicine. · Take pain medicines exactly as directed. ? If the doctor gave you a prescription medicine for pain, take it as prescribed. ? If you are not taking a prescription pain medicine, ask your doctor if you can take an over-the-counter medicine. · Eat less seafood and red meat. · Check with your doctor before drinking alcohol. · Losing weight, if you are overweight, may help reduce attacks of gout.  But do not go on a \"crash diet. \" Losing a lot of weight in a short amount of time can cause a gout attack. When should you call for help? Call your doctor now or seek immediate medical care if:    · You have a fever.     · The joint is so painful you cannot use it.     · You have sudden, unexplained swelling, redness, warmth, or severe pain in one or more joints.    Watch closely for changes in your health, and be sure to contact your doctor if:    · You have joint pain.     · Your symptoms get worse or are not improving after 2 or 3 days. Where can you learn more? Go to http://komal-kameron.info/. Enter C241 in the search box to learn more about \"Gout: Care Instructions. \"  Current as of: April 1, 2019  Content Version: 12.2  © 4379-5546 [employer]. Care instructions adapted under license by [employer] (which disclaims liability or warranty for this information). If you have questions about a medical condition or this instruction, always ask your healthcare professional. [place] [address] any warranty or liability for your use of this information. Patient Education        DASH Diet: Care Instructions  Your Care Instructions    The DASH diet is an eating plan that can help lower your blood pressure. DASH stands for Dietary Approaches to Stop Hypertension. Hypertension is high blood pressure. The DASH diet focuses on eating foods that are high in calcium, potassium, and magnesium. These nutrients can lower blood pressure. The foods that are highest in these nutrients are fruits, vegetables, low-fat dairy products, nuts, seeds, and legumes. But taking calcium, potassium, and magnesium supplements instead of eating foods that are high in those nutrients does not have the same effect. The DASH diet also includes whole grains, fish, and poultry. The DASH diet is one of several lifestyle changes your doctor may recommend to lower your high blood pressure.  Your doctor may also want you to decrease the amount of sodium in your diet. Lowering sodium while following the DASH diet can lower blood pressure even further than just the DASH diet alone. Follow-up care is a key part of your treatment and safety. Be sure to make and go to all appointments, and call your doctor if you are having problems. It's also a good idea to know your test results and keep a list of the medicines you take. How can you care for yourself at home? Following the DASH diet  · Eat 4 to 5 servings of fruit each day. A serving is 1 medium-sized piece of fruit, ½ cup chopped or canned fruit, 1/4 cup dried fruit, or 4 ounces (½ cup) of fruit juice. Choose fruit more often than fruit juice. · Eat 4 to 5 servings of vegetables each day. A serving is 1 cup of lettuce or raw leafy vegetables, ½ cup of chopped or cooked vegetables, or 4 ounces (½ cup) of vegetable juice. Choose vegetables more often than vegetable juice. · Get 2 to 3 servings of low-fat and fat-free dairy each day. A serving is 8 ounces of milk, 1 cup of yogurt, or 1 ½ ounces of cheese. · Eat 6 to 8 servings of grains each day. A serving is 1 slice of bread, 1 ounce of dry cereal, or ½ cup of cooked rice, pasta, or cooked cereal. Try to choose whole-grain products as much as possible. · Limit lean meat, poultry, and fish to 2 servings each day. A serving is 3 ounces, about the size of a deck of cards. · Eat 4 to 5 servings of nuts, seeds, and legumes (cooked dried beans, lentils, and split peas) each week. A serving is 1/3 cup of nuts, 2 tablespoons of seeds, or ½ cup of cooked beans or peas. · Limit fats and oils to 2 to 3 servings each day. A serving is 1 teaspoon of vegetable oil or 2 tablespoons of salad dressing. · Limit sweets and added sugars to 5 servings or less a week. A serving is 1 tablespoon jelly or jam, ½ cup sorbet, or 1 cup of lemonade. · Eat less than 2,300 milligrams (mg) of sodium a day.  If you limit your sodium to 1,500 mg a day, you can lower your blood pressure even more. Tips for success  · Start small. Do not try to make dramatic changes to your diet all at once. You might feel that you are missing out on your favorite foods and then be more likely to not follow the plan. Make small changes, and stick with them. Once those changes become habit, add a few more changes. · Try some of the following:  ? Make it a goal to eat a fruit or vegetable at every meal and at snacks. This will make it easy to get the recommended amount of fruits and vegetables each day. ? Try yogurt topped with fruit and nuts for a snack or healthy dessert. ? Add lettuce, tomato, cucumber, and onion to sandwiches. ? Combine a ready-made pizza crust with low-fat mozzarella cheese and lots of vegetable toppings. Try using tomatoes, squash, spinach, broccoli, carrots, cauliflower, and onions. ? Have a variety of cut-up vegetables with a low-fat dip as an appetizer instead of chips and dip. ? Sprinkle sunflower seeds or chopped almonds over salads. Or try adding chopped walnuts or almonds to cooked vegetables. ? Try some vegetarian meals using beans and peas. Add garbanzo or kidney beans to salads. Make burritos and tacos with mashed callaway beans or black beans. Where can you learn more? Go to http://komal-kameron.info/. Enter X235 in the search box to learn more about \"DASH Diet: Care Instructions. \"  Current as of: April 9, 2019  Content Version: 12.2  © 1124-8306 [employer]. Care instructions adapted under license by [employer] (which disclaims liability or warranty for this information). If you have questions about a medical condition or this instruction, always ask your healthcare professional. [place] [address] any warranty or liability for your use of this information.

## 2019-12-07 NOTE — ED PROVIDER NOTES
EMERGENCY DEPARTMENT HISTORY AND PHYSICAL EXAM      Date: 12/7/2019  Patient Name: Ludmila Domínguez    Please note that this dictation was completed with [employer], the computer voice recognition software. Quite often unanticipated grammatical, syntax, homophones, and other interpretive errors are inadvertently transcribed by the computer software. Please disregard these errors. Please excuse any errors that have escaped final proofreading. History of Presenting Illness     Chief Complaint   Patient presents with    Foot Pain     Into triage stating, \"my gout is acting up. \" C/o non-traumatic Rt foot and Lt elbow pain.  Elbow Pain       History Provided By: Patient    HPI: Ludmila Domínguez, 50 y.o. male with PMHx significant for gout and HTN, presents ambulatory to the ED with cc of \"my gout is acting up. \"  Patient states that his pain is been present for about the last 3 to 4 days. He states that he has experienced gout numerous times in his current pain feels similar. He states he has taken Motrin with no lasting relief of his symptoms. He denies any injury or trauma that could have contributed to his pain. He denies any skin color changes, open wounds, ulcers. Of note, patient states that he is usually prescribed lisinopril and atenolol for his high blood pressure. He has not taken these medications in the last month or so. He states that he has been taking his medications for years but got incarcerated and has not been able to follow-up with his primary care doctor since being released. he denies any chest pain, shortness of breath, headache. PCP: Other, MD Leidy    There are no other complaints, changes, or physical findings at this time. Current Outpatient Medications   Medication Sig Dispense Refill    oxyCODONE-acetaminophen (PERCOCET) 7.5-325 mg per tablet Take 1 Tab by mouth every eight (8) hours as needed for Pain for up to 5 days. Max Daily Amount: 3 Tabs.  Indications: pain 10 Tab 0    methylPREDNISolone (MEDROL, KANG,) 4 mg tablet Per dose pack instructions 1 Dose Pack 0    indomethacin (INDOCIN) 25 mg capsule Take 1 Cap by mouth three (3) times daily for 10 days. With food 30 Cap 0    atenolol (TENORMIN) 25 mg tablet Take 1 Tab by mouth daily. 30 Tab 0    lisinopril (PRINIVIL, ZESTRIL) 10 mg tablet Take 1 Tab by mouth daily. 30 Tab 0    methocarbamol (ROBAXIN) 500 mg tablet Take 1 Tab by mouth four (4) times daily. 20 Tab 0    naproxen (NAPROSYN) 500 mg tablet Take 1 Tab by mouth every twelve (12) hours as needed for Pain. 20 Tab 0    COLCHICINE PO Take  by mouth.  cetirizine (ZYRTEC) 10 mg tablet Take 1 Tab by mouth daily. 20 Tab 0       Past History     Past Medical History:  Past Medical History:   Diagnosis Date    Gout     Hypertension     Other ill-defined conditions(989.89)     gout       Past Surgical History:  Past Surgical History:   Procedure Laterality Date    HX HEENT      eyes    HX ORTHOPAEDIC      right knee petella tendon repair       Family History:  Family History   Problem Relation Age of Onset    Diabetes Neg Hx        Social History:  Social History     Tobacco Use    Smoking status: Never Smoker    Smokeless tobacco: Never Used   Substance Use Topics    Alcohol use: Yes     Alcohol/week: 14.0 standard drinks     Types: 14 Cans of beer per week     Comment: \"6pack beer day\"    Drug use: No       Allergies:  No Known Allergies      Review of Systems   Review of Systems   Constitutional: Negative. Negative for chills and fever. Respiratory: Negative for cough and shortness of breath. Cardiovascular: Negative for chest pain and palpitations. Gastrointestinal: Negative for nausea and vomiting. Genitourinary: Negative for dysuria and hematuria. Musculoskeletal: Positive for arthralgias (Right foot, left ankle). Negative for myalgias. Skin: Negative for color change, rash and wound. Neurological: Negative for numbness and headaches.    All other systems reviewed and are negative. Physical Exam   Physical Exam  Vitals signs and nursing note reviewed. Constitutional:       General: He is not in acute distress. Appearance: He is well-developed. He is not diaphoretic. Comments: 50 y.o.  male in NAD  Communicates appropriately and in full sentences   HENT:      Head: Normocephalic and atraumatic. Eyes:      General:         Right eye: No discharge. Left eye: No discharge. Conjunctiva/sclera: Conjunctivae normal.      Pupils: Pupils are equal, round, and reactive to light. Neck:      Musculoskeletal: Normal range of motion and neck supple. Comments: No nuchal rigidity or meningeal signs  Cardiovascular:      Rate and Rhythm: Normal rate and regular rhythm. Pulses: Normal pulses. Pulmonary:      Effort: Pulmonary effort is normal. No respiratory distress. Musculoskeletal: Normal range of motion. General: Tenderness (To the right ankle and right great toe. Tenderness palpation to the lateral left elbow. No acute overlying skin changes. No acute deformity) present. No deformity. Comments: No neurologic or vascular compromise on exam.    Skin:     General: Skin is warm and dry. Coloration: Skin is not pale. Findings: No erythema or rash. Neurological:      Mental Status: He is alert and oriented to person, place, and time. Psychiatric:         Behavior: Behavior normal.           Diagnostic Study Results   No formal testing initiated. Medical Decision Making   I am the first provider for this patient. I reviewed the vital signs, available nursing notes, past medical history, past surgical history, family history and social history. Vital Signs-Reviewed the patient's vital signs.   Patient Vitals for the past 12 hrs:   Temp Pulse Resp BP SpO2   12/07/19 1614 97.7 °F (36.5 °C) 79 18 (!) 161/102 100 %         Records Reviewed: Nursing Notes and Old Medical Records    Provider Notes (Medical Decision Making):   Differential diagnosis includes gout, arthritis, osteoarthritis, sprain, strain, contusion, elevated blood pressure, poor dietary choices. 42-year-old male with past medical history of gout and hypertension presents ambulatory to the emergency department with acute onset right foot and left elbow pain. Pain is consistent with prior gout flares. No injury or trauma. Plain films deferred. Clinical history and physical exam consistent with gout. Patient also requests refill of his blood pressure medications. Patient has upcoming primary care doctor appointment on January 17, 2019. ED Course:   Initial assessment performed. The patients presenting problems have been discussed, and they are in agreement with the care plan formulated and outlined with them. I have encouraged them to ask questions as they arise throughout their visit. DISCHARGE NOTE:  Jayant Moraes  results have been reviewed with him. He has been counseled regarding his diagnosis. He verbally conveys understanding and agreement of the signs, symptoms, diagnosis, treatment and prognosis and additionally agrees to follow up as recommended with Leidy Muhammad MD in 24 - 48 hours. He also agrees with the care-plan and conveys that all of his questions have been answered. I have also put together some discharge instructions for him that include: 1) educational information regarding their diagnosis, 2) how to care for their diagnosis at home, as well a 3) list of reasons why they would want to return to the ED prior to their follow-up appointment, should their condition change. He and/or family's questions have been answered. I have encouraged them to see the official results in [facility]\" or to retrieve the specifics of their results from medical records. PLAN:  1. Return precautions as discussed  2. Follow-up with providers as directed  3.  Medications as prescribed    Return to ED if worse     Diagnosis     Clinical Impression:   1. Acute gout of right foot, unspecified cause    2. Acute gout of left elbow, unspecified cause    3. Essential hypertension    4. Medication refill        Discharge Medication List as of 12/7/2019  6:48 PM      START taking these medications    Details   oxyCODONE-acetaminophen (PERCOCET) 7.5-325 mg per tablet Take 1 Tab by mouth every eight (8) hours as needed for Pain for up to 5 days. Max Daily Amount: 3 Tabs. Indications: pain, Print, Disp-10 Tab, R-0      indomethacin (INDOCIN) 25 mg capsule Take 1 Cap by mouth three (3) times daily for 10 days. With food, Normal, Disp-30 Cap, R-0         CONTINUE these medications which have CHANGED    Details   methylPREDNISolone (MEDROL, KANG,) 4 mg tablet Per dose pack instructions, Normal, Disp-1 Dose Pack, R-0      atenolol (TENORMIN) 25 mg tablet Take 1 Tab by mouth daily. , Normal, Disp-30 Tab, R-0      lisinopril (PRINIVIL, ZESTRIL) 10 mg tablet Take 1 Tab by mouth daily. , Normal, Disp-30 Tab, R-0         CONTINUE these medications which have NOT CHANGED    Details   methocarbamol (ROBAXIN) 500 mg tablet Take 1 Tab by mouth four (4) times daily. , Normal, Disp-20 Tab, R-0      naproxen (NAPROSYN) 500 mg tablet Take 1 Tab by mouth every twelve (12) hours as needed for Pain., Normal, Disp-20 Tab, R-0      COLCHICINE PO Take  by mouth., Historical Med      cetirizine (ZYRTEC) 10 mg tablet Take 1 Tab by mouth daily. , Print, Disp-20 Tab, R-0             Follow-up Information     Follow up With Specialties Details Why [address]  Call today If symptoms worsen, Possible further evaluation and treatment [address]  [phone]    [facility] EMER[facility]ENCY DEPT Emergency Medicine Go to If symptoms worsen [address] [place] [address]  [phone]              This note will not be viewable in [address]

## 2020-10-06 ENCOUNTER — APPOINTMENT (OUTPATIENT)
Dept: GENERAL RADIOLOGY | Age: 50
End: 2020-10-06
Attending: EMERGENCY MEDICINE
Payer: OTHER GOVERNMENT

## 2020-10-06 ENCOUNTER — HOSPITAL ENCOUNTER (EMERGENCY)
Age: 50
Discharge: HOME | End: 2020-10-06
Attending: EMERGENCY MEDICINE | Admitting: EMERGENCY MEDICINE
Payer: OTHER GOVERNMENT

## 2020-10-06 VITALS
HEART RATE: 68 BPM | DIASTOLIC BLOOD PRESSURE: 100 MMHG | HEIGHT: 75 IN | OXYGEN SATURATION: 98 % | RESPIRATION RATE: 19 BRPM | WEIGHT: 238.76 LBS | SYSTOLIC BLOOD PRESSURE: 144 MMHG | BODY MASS INDEX: 29.69 KG/M2

## 2020-10-06 DIAGNOSIS — R05.9 COUGH: ICD-10-CM

## 2020-10-06 DIAGNOSIS — Z20.822 SUSPECTED COVID-19 VIRUS INFECTION: Primary | ICD-10-CM

## 2020-10-06 LAB
ALBUMIN SERPL-MCNC: 4 G/DL (ref 3.5–5)
ALBUMIN/GLOB SERPL: 1 {RATIO} (ref 1.1–2.2)
ALP SERPL-CCNC: 75 U/L (ref 45–117)
ALT SERPL-CCNC: 34 U/L (ref 12–78)
ANION GAP SERPL CALC-SCNC: 6 MMOL/L (ref 5–15)
AST SERPL-CCNC: 25 U/L (ref 15–37)
ATRIAL RATE: 82 BPM
BASOPHILS # BLD: 0.1 K/UL (ref 0–0.1)
BASOPHILS NFR BLD: 1 % (ref 0–1)
BILIRUB SERPL-MCNC: 1.1 MG/DL (ref 0.2–1)
BUN SERPL-MCNC: 8 MG/DL (ref 6–20)
BUN/CREAT SERPL: 6 (ref 12–20)
CALCIUM SERPL-MCNC: 9 MG/DL (ref 8.5–10.1)
CALCULATED P AXIS, ECG09: 0 DEGREES
CALCULATED R AXIS, ECG10: -16 DEGREES
CALCULATED T AXIS, ECG11: -32 DEGREES
CHLORIDE SERPL-SCNC: 108 MMOL/L (ref 97–108)
CO2 SERPL-SCNC: 26 MMOL/L (ref 21–32)
COMMENT, HOLDF: NORMAL
CREAT SERPL-MCNC: 1.24 MG/DL (ref 0.7–1.3)
DIAGNOSIS, 93000: NORMAL
DIFFERENTIAL METHOD BLD: ABNORMAL
EOSINOPHIL # BLD: 0.5 K/UL (ref 0–0.4)
EOSINOPHIL NFR BLD: 8 % (ref 0–7)
ERYTHROCYTE [DISTWIDTH] IN BLOOD BY AUTOMATED COUNT: 14.9 % (ref 11.5–14.5)
GLOBULIN SER CALC-MCNC: 3.9 G/DL (ref 2–4)
GLUCOSE SERPL-MCNC: 92 MG/DL (ref 65–100)
HCT VFR BLD AUTO: 42.2 % (ref 36.6–50.3)
HGB BLD-MCNC: 13.8 G/DL (ref 12.1–17)
IMM GRANULOCYTES # BLD AUTO: 0 K/UL (ref 0–0.04)
IMM GRANULOCYTES NFR BLD AUTO: 0 % (ref 0–0.5)
LYMPHOCYTES # BLD: 1.5 K/UL (ref 0.8–3.5)
LYMPHOCYTES NFR BLD: 25 % (ref 12–49)
MCH RBC QN AUTO: 29.5 PG (ref 26–34)
MCHC RBC AUTO-ENTMCNC: 32.7 G/DL (ref 30–36.5)
MCV RBC AUTO: 90.2 FL (ref 80–99)
MONOCYTES # BLD: 0.6 K/UL (ref 0–1)
MONOCYTES NFR BLD: 9 % (ref 5–13)
NEUTS SEG # BLD: 3.5 K/UL (ref 1.8–8)
NEUTS SEG NFR BLD: 57 % (ref 32–75)
NRBC # BLD: 0 K/UL (ref 0–0.01)
NRBC BLD-RTO: 0 PER 100 WBC
P-R INTERVAL, ECG05: 186 MS
PLATELET # BLD AUTO: 210 K/UL (ref 150–400)
PMV BLD AUTO: 11.8 FL (ref 8.9–12.9)
POTASSIUM SERPL-SCNC: 3.5 MMOL/L (ref 3.5–5.1)
PROT SERPL-MCNC: 7.9 G/DL (ref 6.4–8.2)
Q-T INTERVAL, ECG07: 390 MS
QRS DURATION, ECG06: 90 MS
QTC CALCULATION (BEZET), ECG08: 455 MS
RBC # BLD AUTO: 4.68 M/UL (ref 4.1–5.7)
SAMPLES BEING HELD,HOLD: NORMAL
SODIUM SERPL-SCNC: 140 MMOL/L (ref 136–145)
VENTRICULAR RATE, ECG03: 82 BPM
WBC # BLD AUTO: 6.2 K/UL (ref 4.1–11.1)

## 2020-10-06 PROCEDURE — 74011250637 HC RX REV CODE- 250/637: Performed by: EMERGENCY MEDICINE

## 2020-10-06 PROCEDURE — 74011250636 HC RX REV CODE- 250/636: Performed by: EMERGENCY MEDICINE

## 2020-10-06 PROCEDURE — 71045 X-RAY EXAM CHEST 1 VIEW: CPT

## 2020-10-06 PROCEDURE — 93005 ELECTROCARDIOGRAM TRACING: CPT

## 2020-10-06 PROCEDURE — 96374 THER/PROPH/DIAG INJ IV PUSH: CPT

## 2020-10-06 PROCEDURE — 36415 COLL VENOUS BLD VENIPUNCTURE: CPT

## 2020-10-06 PROCEDURE — 85025 COMPLETE CBC W/AUTO DIFF WBC: CPT

## 2020-10-06 PROCEDURE — 99285 EMERGENCY DEPT VISIT HI MDM: CPT

## 2020-10-06 PROCEDURE — 80053 COMPREHEN METABOLIC PANEL: CPT

## 2020-10-06 PROCEDURE — 87635 SARS-COV-2 COVID-19 AMP PRB: CPT

## 2020-10-06 RX ORDER — BENZONATATE 100 MG/1
200 CAPSULE ORAL
Qty: 15 CAP | Refills: 0 | Status: SHIPPED | OUTPATIENT
Start: 2020-10-06 | End: 2021-01-06

## 2020-10-06 RX ORDER — ACETAMINOPHEN 500 MG
1000 TABLET ORAL
Status: COMPLETED | OUTPATIENT
Start: 2020-10-06 | End: 2020-10-06

## 2020-10-06 RX ORDER — BENZONATATE 100 MG/1
200 CAPSULE ORAL
Status: COMPLETED | OUTPATIENT
Start: 2020-10-06 | End: 2020-10-06

## 2020-10-06 RX ORDER — KETOROLAC TROMETHAMINE 30 MG/ML
30 INJECTION, SOLUTION INTRAMUSCULAR; INTRAVENOUS
Status: COMPLETED | OUTPATIENT
Start: 2020-10-06 | End: 2020-10-06

## 2020-10-06 RX ORDER — BENZONATATE 100 MG/1
200 CAPSULE ORAL
Qty: 15 CAP | Refills: 0 | OUTPATIENT
Start: 2020-10-06 | End: 2020-10-06

## 2020-10-06 RX ADMIN — KETOROLAC TROMETHAMINE 30 MG: 30 INJECTION, SOLUTION INTRAMUSCULAR at 09:27

## 2020-10-06 RX ADMIN — SODIUM CHLORIDE 1000 ML: 900 INJECTION, SOLUTION INTRAVENOUS at 09:28

## 2020-10-06 RX ADMIN — BENZONATATE 200 MG: 100 CAPSULE ORAL at 09:27

## 2020-10-06 RX ADMIN — ACETAMINOPHEN 1000 MG: 500 TABLET ORAL at 09:27

## 2020-10-06 NOTE — DISCHARGE INSTRUCTIONS
Prevention steps for patients with confirmed or suspected COVID-19 who do not need to be hospitalized    Timing for Self-Isolation    If you have been exposed but do not have symptoms:  If you suspect you have been exposed to someone with COVID-19 and don't have any symptoms, you should self-isolate at home for 14 days from the time of exposure. If you have symptoms whether or not you have been tested: If you have symptoms suggestive of COVID-19, such as fever or cough, regardless of whether you have been tested, you should self-isolate at home until 72 hours (3 days) after your symptoms have completely resolved AND 7 days after your symptoms first started, whichever is later. How to Properly Self-Isolate Due to COVID-19    Stay home except to get medical care  People who are mildly ill with COVID-19 are able to isolate at home during their illness. You should restrict activities outside your home, except for getting medical care. Do not go to work, school, or public areas. Avoid using public transportation, ride-sharing, or taxis. If you or a loved one must go out, please make sure to wash hands properly immediately on returning home. Disinfect touched surfaces. Do not touch your face. Separate yourself from other people and animals in your home  People: As much as possible, you should stay in a specific room and away from other people in your home. Also, you should use a separate bathroom, if available. Animals: You should restrict contact with pets and other animals while you are sick with COVID-19, just like you would around other people. Although there have not been reports of pets or other animals becoming sick with COVID-19, it is still recommended that people sick with COVID-19 limit contact with animals until more information is known about the virus. When possible, have another member of your household care for your animals while you are sick.  If you are sick with COVID-19, avoid contact with your pet, including petting, snuggling, being kissed or licked, and sharing food. If you must care for your pet or be around animals while you are sick, wash your hands before and after you interact with pets and wear a facemask. Call ahead before visiting your doctor  If you have a medical appointment, call the healthcare provider and tell them that you have or may have COVID-19. This will help the healthcare providers office take steps to keep other people from getting infected or exposed. Wear a facemask  You should wear a facemask when you are around other people (e.g., sharing a room or vehicle) or pets and before you enter a healthcare providers office. If you are not able to wear a facemask (for example, because it causes trouble breathing), then people who live with you should not stay in the same room with you, or they should wear a facemask if they enter your room. Cover your coughs and sneezes  Cover your mouth and nose with a tissue when you cough or sneeze. Throw used tissues in a lined trash can. Immediately wash your hands with soap and water for at least 20 seconds or, if soap and water are not available, clean your hands with an alcohol-based hand  that contains at least 60% alcohol. Clean your hands often  Wash your hands often with soap and water for at least 20 seconds, especially after blowing your nose, coughing, or sneezing; going to the bathroom; and before eating or preparing food. If soap and water are not readily available, use an alcohol-based hand  with at least 60% alcohol, covering all surfaces of your hands and rubbing them together until they feel dry. Soap and water are the best option if hands are visibly dirty. Avoid touching your eyes, nose, and mouth with unwashed hands. Avoid sharing personal household items  You should not share dishes, drinking glasses, cups, eating utensils, towels, or bedding with other people or pets in your home.  After using these items, they should be washed thoroughly with soap and water. Clean all high-touch surfaces everyday  High touch surfaces include counters, tabletops, doorknobs, bathroom fixtures, toilets, phones, keyboards, tablets, and bedside tables. Also, clean any surfaces that may have blood, stool, or body fluids on them. Use a household cleaning spray or wipe, according to the label instructions. Labels contain instructions for safe and effective use of the cleaning product including precautions you should take when applying the product, such as wearing gloves and making sure you have good ventilation during use of the product. Monitor your symptoms  Seek prompt medical attention if your illness is worsening (e.g., difficulty breathing). Before seeking care, call your healthcare provider and tell them that you have, or are being evaluated for, COVID-19. Put on a facemask before you enter the facility. These steps will help the healthcare providers office to keep other people in the office or waiting room from getting infected or exposed. Ask your healthcare provider to call the local or [facility] health department. Persons who are placed under active monitoring or facilitated self-monitoring should follow instructions provided by their local health department or occupational health professionals, as appropriate. When working with your local health department check their available hours. If you have a medical emergency and need to call 911, notify the dispatch personnel that you have, or are being evaluated for COVID-19. If possible, put on a facemask before emergency medical services arrive. Discontinuing home isolation  Patients with confirmed COVID-19 should remain under home isolation precautions until the risk of secondary transmission to others is thought to be low based on the above CDC recommendations.  The decision to discontinue home isolation precautions should be made on a case-by-case basis, in consultation with healthcare providers and state and local health departments. Oupatient testing  You can now get tested on an outpatient basis if you are showing symptoms of Covid19 at one of the [facility] or [facility] by appointment only. BETTER MED:  [email or web address] to make an appointment. PATIENT FIRST: Cricket to make an appointment. This service is currently offered at the [facility] and [facility]. To make an appointment, call your preferred Center and enter 5 during the recording to speak with the [employer]. All [facility], including the [facility], remain Open Every Day for Walk-in care of Illness and Injury. Who can be tested? In order to make an appointment to be tested, you must meet screening criteria, which are based on CDC guidance. The screening criteria include either of the following conditions:   You have a symptom or symptoms of COVID-19 (fever, coughing, shortness of breath, sore throat).  You are a healthcare worker or . What is the cost?  For insured patients, there is no out-of-pocket expense. The visit will be submitted to patients' insurance. [facility] accepts all major insurance plans, including Medicare and Medicaid. For self-pay patients, the cost is $90 for the exam plus a separate bill from the lab, which is $51.31 in [place]. What is the process for being tested? First, make an appointment by calling your local Designated Patient [facility]. Please have your insurance card on hand when you call. Bring your insurance card and picture ID with you to your appointment. Upon arrival, follow the Coronavirus Testing signs and park in a designated testing parking spot.  A staff member will meet you at your car to verify your information, complete your registration, check vitals, and take a sample for testing. The sample will be sent to a reference lab for testing. Self-quarantine until you receive your results. A nurse will call you once your results are available, and will provide you with guidance and answer any questions you may have. Further resources and [address]  Please visit the CDC website for more information. [email or web address]. [facility] residents with questions about COVID-19 can call the [address] at [employer].

## 2020-10-06 NOTE — LETTER
[address] 
[facility] EMERGENCY DEPT 
[address] [place] [address] Sue Whyte [id number] [phone] Work/School Note Date: 10/6/2020 To Whom It May concern: 
 
Samantha Quezada was seen and treated today in the emergency room by the following provider(s): 
Attending Provider: Lindsey Godinez MD. Samantha Quezada please excuse from work October 6, 2020 through October 20, 2020. Sincerely, Elizabeth De Guzman MD

## 2020-10-06 NOTE — ED PROVIDER NOTES
EMERGENCY DEPARTMENT HISTORY AND PHYSICAL EXAM      Date: 10/6/2020  Patient Name: Lin Alcala    History of Presenting Illness     Chief Complaint   Patient presents with    Cough     Pt arrived to ED from work with SOB, cough, and dizzines.  Shortness of Breath       History Provided By: Patient    HPI: Lin Alcala, 52 y.o. male presents to the ED with cc of shortness of breath, diarrhea and cough x3 days. Patient relays a prior COVID-19 test approximately 2 weeks ago that was negative and he had been feeling a bit better but then had resurgence of symptoms. He denies any fever, any known sick contacts or COVID-19. He denies any abdominal pain, any blood in his stool, difficulty urinating. The cough is nonproductive. Patient is a smoker but denies any history of COPD or asthma. He has not had any medications at home to help with his symptoms. There are no other complaints, changes, or physical findings at this time. PCP: Destiney, MD Leidy    No current facility-administered medications on file prior to encounter. Current Outpatient Medications on File Prior to Encounter   Medication Sig Dispense Refill    atenolol (TENORMIN) 25 mg tablet Take 1 Tab by mouth daily. 30 Tab 0    lisinopril (PRINIVIL, ZESTRIL) 10 mg tablet Take 1 Tab by mouth daily. 30 Tab 0    cetirizine (ZYRTEC) 10 mg tablet Take 1 Tab by mouth daily. 20 Tab 0    [DISCONTINUED] methylPREDNISolone (MEDROL, KANG,) 4 mg tablet Per dose pack instructions 1 Dose Pack 0    [DISCONTINUED] methocarbamol (ROBAXIN) 500 mg tablet Take 1 Tab by mouth four (4) times daily. 20 Tab 0    [DISCONTINUED] naproxen (NAPROSYN) 500 mg tablet Take 1 Tab by mouth every twelve (12) hours as needed for Pain. 20 Tab 0    [DISCONTINUED] COLCHICINE PO Take  by mouth.          Past History     Past Medical History:  Past Medical History:   Diagnosis Date    Gout     Hypertension     Other ill-defined conditions(603.64)     gout       Past Surgical History:  Past Surgical History:   Procedure Laterality Date    HX HEENT      eyes    HX ORTHOPAEDIC      right knee petella tendon repair       Family History:  Family History   Problem Relation Age of Onset    Diabetes Neg Hx        Social History:  Social History     Tobacco Use    Smoking status: Light Tobacco Smoker    Smokeless tobacco: Never Used   Substance Use Topics    Alcohol use: Yes     Alcohol/week: 14.0 standard drinks     Types: 14 Cans of beer per week     Comment: \"6pack beer day\"    Drug use: No       Allergies:  No Known Allergies      Review of Systems   Review of Systems   Constitutional: Positive for appetite change and fatigue. Negative for unexpected weight change. HENT: Positive for congestion. Negative for trouble swallowing. Respiratory: Positive for cough and shortness of breath. Negative for chest tightness. Cardiovascular: Negative for chest pain, palpitations and leg swelling. Gastrointestinal: Positive for diarrhea. Negative for abdominal pain and blood in stool. Musculoskeletal: Negative for back pain and neck pain. Neurological: Negative for dizziness and light-headedness. All other systems reviewed and are negative. Physical Exam   Physical Exam   Vital signs and nursing notes reviewed    CONSTITUTIONAL: Alert, in mild distress; well-developed; well-nourished. Pleasant disposition, wearing mask, actively coughing upon entering the room. HEAD:  Normocephalic, atraumatic  EYES: PERRL; EOM's intact. ENTM: Nose: no rhinorrhea; Throat: no erythema or exudate, mucous membranes moist  Neck:  Supple. trachea is midline. No JVD bilaterally. RESP: Chest clear, equal breath sounds. Able to talk in full sentences, oxygen saturation 98% on room air. CV: S1 and S2 WNL; No murmurs, gallops or rubs. 2+ radial and DP pulses bilaterally. GI: non-distended, normal bowel sounds, abdomen soft and non-tender. No masses or organomegaly.   : No costo-vertebral angle tenderness. BACK:  Non-tender, normal appearance  UPPER EXT:  Normal inspection. no joint or soft tissue swelling  LOWER EXT: No edema, no calf tenderness. NEURO: Alert and oriented x3, 5/5 strength and light touch sensation intact in bilateral upper and lower extremities. SKIN: No rashes; Warm and dry  PSYCH: Normal mood, normal affect    Diagnostic Study Results     Labs -     Recent Results (from the past 12 hour(s))   EKG, 12 LEAD, INITIAL    Collection Time: 10/06/20  8:28 AM   Result Value Ref Range    Ventricular Rate 82 BPM    Atrial Rate 82 BPM    P-R Interval 186 ms    QRS Duration 90 ms    Q-T Interval 390 ms    QTC Calculation (Bezet) 455 ms    Calculated P Axis 0 degrees    Calculated R Axis -16 degrees    Calculated T Axis -32 degrees    Diagnosis       Normal sinus rhythm  Possible Left atrial enlargement  ST & T wave abnormality, consider lateral ischemia  When compared with ECG of 10-OCT-2019 07:41,  No significant change was found     CBC WITH AUTOMATED DIFF    Collection Time: 10/06/20  8:42 AM   Result Value Ref Range    WBC 6.2 4.1 - 11.1 K/uL    RBC 4.68 4.10 - 5.70 M/uL    HGB 13.8 12.1 - 17.0 g/dL    HCT 42.2 36.6 - 50.3 %    MCV 90.2 80.0 - 99.0 FL    MCH 29.5 26.0 - 34.0 PG    MCHC 32.7 30.0 - 36.5 g/dL    RDW 14.9 (H) 11.5 - 14.5 %    PLATELET 123 745 - 668 K/uL    MPV 11.8 8.9 - 12.9 FL    NRBC 0.0 0  WBC    ABSOLUTE NRBC 0.00 0.00 - 0.01 K/uL    NEUTROPHILS 57 32 - 75 %    LYMPHOCYTES 25 12 - 49 %    MONOCYTES 9 5 - 13 %    EOSINOPHILS 8 (H) 0 - 7 %    BASOPHILS 1 0 - 1 %    IMMATURE GRANULOCYTES 0 0.0 - 0.5 %    ABS. NEUTROPHILS 3.5 1.8 - 8.0 K/UL    ABS. LYMPHOCYTES 1.5 0.8 - 3.5 K/UL    ABS. MONOCYTES 0.6 0.0 - 1.0 K/UL    ABS. EOSINOPHILS 0.5 (H) 0.0 - 0.4 K/UL    ABS. BASOPHILS 0.1 0.0 - 0.1 K/UL    ABS. IMM.  GRANS. 0.0 0.00 - 0.04 K/UL    DF AUTOMATED     METABOLIC PANEL, COMPREHENSIVE    Collection Time: 10/06/20  8:42 AM   Result Value Ref Range Sodium 140 136 - 145 mmol/L    Potassium 3.5 3.5 - 5.1 mmol/L    Chloride 108 97 - 108 mmol/L    CO2 26 21 - 32 mmol/L    Anion gap 6 5 - 15 mmol/L    Glucose 92 65 - 100 mg/dL    BUN 8 6 - 20 MG/DL    Creatinine 1.24 0.70 - 1.30 MG/DL    BUN/Creatinine ratio 6 (L) 12 - 20      GFR est AA >60 >60 ml/min/1.73m2    GFR est non-AA >60 >60 ml/min/1.73m2    Calcium 9.0 8.5 - 10.1 MG/DL    Bilirubin, total 1.1 (H) 0.2 - 1.0 MG/DL    ALT (SGPT) 34 12 - 78 U/L    AST (SGOT) 25 15 - 37 U/L    Alk. phosphatase 75 45 - 117 U/L    Protein, total 7.9 6.4 - 8.2 g/dL    Albumin 4.0 3.5 - 5.0 g/dL    Globulin 3.9 2.0 - 4.0 g/dL    A-G Ratio 1.0 (L) 1.1 - 2.2     SAMPLES BEING HELD    Collection Time: 10/06/20  8:47 AM   Result Value Ref Range    SAMPLES BEING HELD  BLUE, 2 RED, LAV, DRK GRN, SST     COMMENT        Add-on orders for these samples will be processed based on acceptable specimen integrity and analyte stability, which may vary by analyte. Radiologic Studies -   XR CHEST PORT   Final Result   IMPRESSION: No acute changes. CT Results  (Last 48 hours)    None        CXR Results  (Last 48 hours)               10/06/20 1004  XR CHEST PORT Final result    Impression:  IMPRESSION: No acute changes. Narrative:  Clinical indication: Cough. Portable AP upright view of the chest obtained, comparison 2018. The heart size   is normal. There is no acute infiltrate. Medical Decision Making   I am the first provider for this patient. I reviewed the vital signs, available nursing notes, past medical history, past surgical history, family history and social history. Vital Signs-Reviewed the patient's vital signs.   Patient Vitals for the past 12 hrs:   Pulse Resp BP SpO2   10/06/20 1145 68 19 -- 98 %   10/06/20 1130 70 18 (!) 144/100 98 %   10/06/20 1115 68 19 -- 98 %   10/06/20 1100 70 20 (!) 158/107 97 %   10/06/20 1045 66 16 -- 98 %   10/06/20 1030 68 21 (!) 160/108 94 %   10/06/20 1015 67 20 -- 97 %   10/06/20 1000 67 18 (!) 154/100 98 %   10/06/20 0945 75 27 -- 98 %   10/06/20 0930 78 23 (!) 167/118 98 %   10/06/20 0915 80 24 -- 98 %   10/06/20 0900 78 27 (!) 167/129 98 %   10/06/20 0845 82 15 -- 100 %   10/06/20 0841 -- -- -- 99 %   10/06/20 0840 82 19 -- 99 %       EKG interpretation: (Preliminary)  EKG performed at 8:28 AM, as interpreted by me, shows a normal sinus rhythm at a rate 82, normal axis, normal intervals, slight T wave inversion noted in V4 through V6. T wave inversion was present in V6 on October 10, 2019 EKG. Records Reviewed: Nursing Notes and Old Medical Records    Provider Notes (Medical Decision Making):   70-year-old male with constellation of symptoms concerning for viral syndrome with likely potential for COVID-19. Droplet plus precautions placed on patient while here in the emergency department. Thankfully, he is not hypoxic, no evidence of infiltrate or pulmonary edema or pneumothorax on x-ray. Patient would be served by utilizing cough suppressant, quarantining himself and have provided instructions as such. Patient was tested for COVID-19 while here. Discussed body aches fever control PRN. Plan for discharge. ED Course:   Initial assessment performed. The patients presenting problems have been discussed, and they are in agreement with the care plan formulated and outlined with them. I have encouraged them to ask questions as they arise throughout their visit. Disposition:  Discharge    Discharge Note:  12:11 PM  The pt is ready for discharge. The pt's signs, symptoms, diagnosis, and discharge instructions have been discussed and pt has conveyed their understanding. The pt is to follow up as recommended or return to ER should their symptoms worsen. Plan has been discussed and pt is in agreement. DISCHARGE PLAN:  1.    Current Discharge Medication List      START taking these medications    Details   benzonatate (Tessalon Perles) 100 mg capsule Take 2 Caps by mouth three (3) times daily as needed for Cough for up to 15 doses. Qty: 15 Cap, Refills: 0           2. Follow-up Information     Follow up With Specialties Details Why Jayla Andersen Raoul Tino Delacruz department will call you if your COVID test is positive. Results usually come in the next 2 to 3 days. Please drink plenty of fluids and use Tylenol 1000 mg every 6 hours for pain or ibuprofen 600 mg every 6 hours for fever pain. [facility] EMERGENCY DEPT Emergency Medicine  If symptoms worsen cleaning worsening shortness of breath, new difficulty breathing or other new concerning symptoms. [address] [place] [address]  [phone]        3. Return to ED if worse     Diagnosis     Clinical Impression:   1. Suspected COVID-19 virus infection    2. Cough        Attestations:    Darrian Grider MD    Please note that this dictation was completed with [employer], the computer voice recognition software. Quite often unanticipated grammatical, syntax, homophones, and other interpretive errors are inadvertently transcribed by the computer software. Please disregard these errors. Please excuse any errors that have escaped final proofreading. Thank you.

## 2020-10-06 NOTE — ED NOTES
MALLIKA Kim reviewed discharge instructions with the patient. The patient verbalized understanding. All questions and concerns were addressed. The patient declined a wheelchair and is discharged ambulatory in the care of family members with instructions and prescriptions in hand. Pt is alert and oriented x 4. Respirations are clear and unlabored.

## 2020-10-06 NOTE — ED NOTES
Pt arrives from home reporting SOB, diarrhea, cough since Sat. Pt says he was C-19 tested 2 weeks ago and was negative. Pt denies fever and is afebrile at this time.

## 2020-10-06 NOTE — PROGRESS NOTES
Pharmacy Clarification of Prior to Admission Medication Regimen     The patient was  interviewed regarding clarification of the prior to admission medication regimen. *    Patient was questioned regarding use of any other inhalers, topical products, over the counter medications, herbal medications, vitamin products or ophthalmic/nasal/otic medication use. Information Obtained From: Patient, Rx query    Pertinent Pharmacy Findings:  Updated patients preferred outpatient pharmacy to: [facility] on [address] [facility] medication list was corrected to the following:     Prior to Admission Medications   Prescriptions Last Dose Informant Taking?   atenolol (TENORMIN) 25 mg tablet 10/6/2020 at 0500 Self Yes   Sig: Take 1 Tab by mouth daily. cetirizine (ZYRTEC) 10 mg tablet 9/29/2020 at Unknown time Self Yes   Sig: Take 1 Tab by mouth daily. lisinopril (PRINIVIL, ZESTRIL) 10 mg tablet 10/6/2020 at 0500 Self Yes   Sig: Take 1 Tab by mouth daily.       Facility-Administered Medications: None          Thank you,  Valentino Pinna  Medication History Pharmacy Technician

## 2020-10-07 ENCOUNTER — PATIENT OUTREACH (OUTPATIENT)
Dept: CASE MANAGEMENT | Age: 50
End: 2020-10-07

## 2020-10-07 LAB
COVID-19, XGCOVT: NOT DETECTED
HEALTH STATUS, XMCV2T: NORMAL
SOURCE, COVRS: NORMAL
SPECIMEN SOURCE, FCOV2M: NORMAL
SPECIMEN TYPE, XMCV1T: NORMAL

## 2020-10-07 NOTE — PROGRESS NOTES
Patient contacted regarding COVID-19 exposure. Discussed COVID-19 related testing which was pending at this time. Test results were pending. Patient informed of results, if available? yes. Outreach made within 2 business days of discharge: Yes    Care Transition Nurse/ Ambulatory Care Manager/ LPN Care Coordinator contacted the patient by telephone to perform post discharge assessment. Verified name and  with patient as identifiers. Provided introduction to self, and explanation of the CTN/ACM/LPN role, and reason for call due to risk factors for infection and/or exposure to COVID-19. Symptoms reviewed with patient who verbalized the following symptoms: fatigue, cough and dizziness/lightheadedness. Due to no new or worsening symptoms encounter was not routed to provider for escalation. Discussed follow-up appointments. If no appointment was previously scheduled, appointment scheduling offered: no Patient reports he will be seen at [facility] and they will call him back with an [facility] follow up appointment(s): No future appointments. Non-[facility] follow up appointment(s): none reported      Advance Care Planning:   Does patient have an Advance Directive: currently not on file; education provided     Patient has following risk factors of: none reported. CTN/ACM/LPN reviewed discharge instructions, medical action plan and red flags such as increased shortness of breath, increasing fever and signs of decompensation with patient who verbalized understanding. Discussed exposure protocols and quarantine with CDC Guidelines What to do if you are sick with coronavirus disease 2019.  Patient was given an opportunity for questions and concerns. The patient agrees to contact the [facility] exposure line [phone], local [facility] department [address]  ([phone]) and PCP office for questions related to their healthcare.  CTN/ACM provided contact information for future needs.    Reviewed and educated patient on any new and changed medications related to discharge diagnosis. Patient/family/caregiver given information for [employer] and agrees to enroll no  Patient's preferred e-mail:  n/a  Patient's preferred phone number: n/a  Based on Loop alert triggers, patient will be contacted by nurse care manager for worsening symptoms. Plan to follow up in 14 days  based on severity of symptoms and risk factors.  DMB

## 2020-10-22 ENCOUNTER — PATIENT OUTREACH (OUTPATIENT)
Dept: CASE MANAGEMENT | Age: 50
End: 2020-10-22

## 2020-10-22 NOTE — PROGRESS NOTES
Patient resolved from Transition of Care episode on 10/22/2020. ACM/CTN was unsuccessful at contacting this patient today. Patient/family was provided the following resources and education related to COVID-19 during the initial call:                         Signs, symptoms and red flags related to COVID-19            CDC exposure and quarantine guidelines            Conduit exposure contact - [phone]            Contact for their local Department of Health                 Patient has not had any additional ED or hospital visits. No further outreach scheduled with this CTN/ACM. Episode of Care resolved. Patient has this CTN/ACM contact information if future needs arise.

## 2020-12-04 ENCOUNTER — HOSPITAL ENCOUNTER (EMERGENCY)
Age: 50
Discharge: HOME | End: 2020-12-04
Attending: EMERGENCY MEDICINE

## 2020-12-04 ENCOUNTER — APPOINTMENT (OUTPATIENT)
Dept: GENERAL RADIOLOGY | Age: 50
End: 2020-12-04
Attending: EMERGENCY MEDICINE

## 2020-12-04 VITALS
SYSTOLIC BLOOD PRESSURE: 198 MMHG | HEART RATE: 85 BPM | OXYGEN SATURATION: 100 % | RESPIRATION RATE: 18 BRPM | TEMPERATURE: 98 F | DIASTOLIC BLOOD PRESSURE: 165 MMHG | HEIGHT: 75 IN | WEIGHT: 243.17 LBS | BODY MASS INDEX: 30.23 KG/M2

## 2020-12-04 DIAGNOSIS — Z20.828 EXPOSURE TO SARS-ASSOCIATED CORONAVIRUS: Primary | ICD-10-CM

## 2020-12-04 DIAGNOSIS — M79.10 MYALGIA: ICD-10-CM

## 2020-12-04 DIAGNOSIS — R19.7 DIARRHEA, UNSPECIFIED TYPE: ICD-10-CM

## 2020-12-04 DIAGNOSIS — R05.9 COUGH: ICD-10-CM

## 2020-12-04 LAB
ALBUMIN SERPL-MCNC: 3.7 G/DL (ref 3.5–5)
ALBUMIN/GLOB SERPL: 1 {RATIO} (ref 1.1–2.2)
ALP SERPL-CCNC: 67 U/L (ref 45–117)
ALT SERPL-CCNC: 38 U/L (ref 12–78)
ANION GAP SERPL CALC-SCNC: 3 MMOL/L (ref 5–15)
AST SERPL-CCNC: 49 U/L (ref 15–37)
BASOPHILS # BLD: 0 K/UL (ref 0–0.1)
BASOPHILS NFR BLD: 0 % (ref 0–1)
BILIRUB SERPL-MCNC: 0.6 MG/DL (ref 0.2–1)
BUN SERPL-MCNC: 12 MG/DL (ref 6–20)
BUN/CREAT SERPL: 10 (ref 12–20)
CALCIUM SERPL-MCNC: 9.1 MG/DL (ref 8.5–10.1)
CHLORIDE SERPL-SCNC: 110 MMOL/L (ref 97–108)
CO2 SERPL-SCNC: 27 MMOL/L (ref 21–32)
COVID-19, XGCOVT: NOT DETECTED
CREAT SERPL-MCNC: 1.2 MG/DL (ref 0.7–1.3)
DIFFERENTIAL METHOD BLD: ABNORMAL
EOSINOPHIL # BLD: 0.1 K/UL (ref 0–0.4)
EOSINOPHIL NFR BLD: 3 % (ref 0–7)
ERYTHROCYTE [DISTWIDTH] IN BLOOD BY AUTOMATED COUNT: 14.6 % (ref 11.5–14.5)
GLOBULIN SER CALC-MCNC: 3.7 G/DL (ref 2–4)
GLUCOSE SERPL-MCNC: 101 MG/DL (ref 65–100)
HCT VFR BLD AUTO: 40.4 % (ref 36.6–50.3)
HEALTH STATUS, XMCV2T: NORMAL
HGB BLD-MCNC: 13 G/DL (ref 12.1–17)
IMM GRANULOCYTES # BLD AUTO: 0 K/UL (ref 0–0.04)
IMM GRANULOCYTES NFR BLD AUTO: 0 % (ref 0–0.5)
LYMPHOCYTES # BLD: 1.1 K/UL (ref 0.8–3.5)
LYMPHOCYTES NFR BLD: 26 % (ref 12–49)
MCH RBC QN AUTO: 29.1 PG (ref 26–34)
MCHC RBC AUTO-ENTMCNC: 32.2 G/DL (ref 30–36.5)
MCV RBC AUTO: 90.6 FL (ref 80–99)
MONOCYTES # BLD: 0.6 K/UL (ref 0–1)
MONOCYTES NFR BLD: 13 % (ref 5–13)
NEUTS SEG # BLD: 2.6 K/UL (ref 1.8–8)
NEUTS SEG NFR BLD: 58 % (ref 32–75)
NRBC # BLD: 0 K/UL (ref 0–0.01)
NRBC BLD-RTO: 0 PER 100 WBC
PLATELET # BLD AUTO: 162 K/UL (ref 150–400)
PMV BLD AUTO: 11.8 FL (ref 8.9–12.9)
POTASSIUM SERPL-SCNC: 4.2 MMOL/L (ref 3.5–5.1)
PROT SERPL-MCNC: 7.4 G/DL (ref 6.4–8.2)
RBC # BLD AUTO: 4.46 M/UL (ref 4.1–5.7)
SODIUM SERPL-SCNC: 140 MMOL/L (ref 136–145)
SOURCE, COVRS: NORMAL
SPECIMEN SOURCE, FCOV2M: NORMAL
SPECIMEN TYPE, XMCV1T: NORMAL
WBC # BLD AUTO: 4.4 K/UL (ref 4.1–11.1)

## 2020-12-04 PROCEDURE — 74011250636 HC RX REV CODE- 250/636: Performed by: EMERGENCY MEDICINE

## 2020-12-04 PROCEDURE — 87635 SARS-COV-2 COVID-19 AMP PRB: CPT

## 2020-12-04 PROCEDURE — 74011250637 HC RX REV CODE- 250/637: Performed by: EMERGENCY MEDICINE

## 2020-12-04 PROCEDURE — 85025 COMPLETE CBC W/AUTO DIFF WBC: CPT

## 2020-12-04 PROCEDURE — 80053 COMPREHEN METABOLIC PANEL: CPT

## 2020-12-04 PROCEDURE — 96374 THER/PROPH/DIAG INJ IV PUSH: CPT

## 2020-12-04 PROCEDURE — 99283 EMERGENCY DEPT VISIT LOW MDM: CPT

## 2020-12-04 PROCEDURE — 36415 COLL VENOUS BLD VENIPUNCTURE: CPT

## 2020-12-04 PROCEDURE — 71045 X-RAY EXAM CHEST 1 VIEW: CPT

## 2020-12-04 RX ORDER — ACETAMINOPHEN 500 MG
1000 TABLET ORAL
Status: COMPLETED | OUTPATIENT
Start: 2020-12-04 | End: 2020-12-04

## 2020-12-04 RX ORDER — IBUPROFEN 800 MG/1
800 TABLET ORAL
Qty: 20 TAB | Refills: 0 | Status: SHIPPED | OUTPATIENT
Start: 2020-12-04 | End: 2020-12-11

## 2020-12-04 RX ORDER — UREA 10 %
220 LOTION (ML) TOPICAL DAILY
Qty: 5 TAB | Refills: 0 | Status: SHIPPED | OUTPATIENT
Start: 2020-12-04 | End: 2021-01-28 | Stop reason: ALTCHOICE

## 2020-12-04 RX ORDER — ASCORBIC ACID 500 MG
500 TABLET ORAL 2 TIMES DAILY
Qty: 10 TAB | Refills: 0 | Status: SHIPPED | OUTPATIENT
Start: 2020-12-04 | End: 2021-01-28 | Stop reason: ALTCHOICE

## 2020-12-04 RX ORDER — ALBUTEROL SULFATE 90 UG/1
2 AEROSOL, METERED RESPIRATORY (INHALATION)
Qty: 1 INHALER | Refills: 0 | Status: SHIPPED | OUTPATIENT
Start: 2020-12-04 | End: 2021-01-28 | Stop reason: ALTCHOICE

## 2020-12-04 RX ORDER — KETOROLAC TROMETHAMINE 30 MG/ML
15 INJECTION, SOLUTION INTRAMUSCULAR; INTRAVENOUS
Status: COMPLETED | OUTPATIENT
Start: 2020-12-04 | End: 2020-12-04

## 2020-12-04 RX ADMIN — KETOROLAC TROMETHAMINE 15 MG: 30 INJECTION, SOLUTION INTRAMUSCULAR at 09:31

## 2020-12-04 RX ADMIN — ACETAMINOPHEN 1000 MG: 500 TABLET ORAL at 09:31

## 2020-12-04 NOTE — LETTER
[address] 
[facility] EMERGENCY DEPT 
[address] [place] [address] [place] [facility] [address] [phone] Work/School Note Date: 12/4/2020 To Whom It May concern: 
 
Yaima Horn was seen and treated today in the ER by the following provider(s): 
Attending Provider: Abel Muse MD. Yaima Horn may return to work on 12/11/20. Sincerely, Dana Tucker MD

## 2020-12-04 NOTE — ED PROVIDER NOTES
EMERGENCY DEPARTMENT HISTORY AND PHYSICAL EXAM      Date: 12/4/2020  Patient Name: Isma Rosas    History of Presenting Illness     Chief Complaint   Patient presents with    Concern For LLQFL-68 (Coronavirus)     pt arrives to the ED with c/o positive COVID exposure on 11/25    Generalized Body Aches     pt states sunday he started having some body aches with worsening symptoms last night, pain 8/10    Cough     pt states he has had a non productive cough since wednesday, pt states he feels SOB on ambulation. pt denies fever, N/V, however states he has had chills and diarrhea       History Provided By: Patient    HPI: Isma Rosas, 52 y.o. male with history of hypertension presents to the ED with cc of cough, generalized body aches, chills, diarrhea, and concern for COVID-19 infection. Patient was recently notified by his cousin who he saw over Thanksgiving that his cousin recently tested positive for COVID-19. About 3 days ago the patient developed the symptoms as above including dry hacking cough, subjective chills, diarrhea, and generalized body aches. Nobody else sick at home. He denies any known fevers. He does feel short of breath and felt dizziness this morning which prompted him to come to the ER for evaluation. He denies any chest pain, nausea, vomiting. He does endorse a change to his sense of smell and sense of taste. Denies any other prior medical history. There are no other complaints, changes, or physical findings at this time. PCP: Other, MD Leidy    No current facility-administered medications on file prior to encounter. Current Outpatient Medications on File Prior to Encounter   Medication Sig Dispense Refill    benzonatate (Tessalon Perles) 100 mg capsule Take 2 Caps by mouth three (3) times daily as needed for Cough for up to 15 doses. 15 Cap 0    atenolol (TENORMIN) 25 mg tablet Take 1 Tab by mouth daily.  30 Tab 0    lisinopril (PRINIVIL, ZESTRIL) 10 mg tablet Take 1 Tab by mouth daily. 30 Tab 0    cetirizine (ZYRTEC) 10 mg tablet Take 1 Tab by mouth daily. 20 Tab 0       Past History     Past Medical History:  Past Medical History:   Diagnosis Date    Gout     Hypertension     Other ill-defined conditions(799.89)     gout       Past Surgical History:  Past Surgical History:   Procedure Laterality Date    HX HEENT      eyes    HX ORTHOPAEDIC      right knee petella tendon repair       Family History:  Family History   Problem Relation Age of Onset    Diabetes Neg Hx        Social History:  Social History     Tobacco Use    Smoking status: Light Tobacco Smoker    Smokeless tobacco: Never Used   Substance Use Topics    Alcohol use: Yes     Alcohol/week: 14.0 standard drinks     Types: 14 Cans of beer per week     Comment: \"6pack beer day\"    Drug use: No       Allergies:  No Known Allergies      Review of Systems   Review of Systems   Constitutional: Positive for chills and fatigue. Negative for fever. HENT: Negative. Respiratory: Negative for cough and shortness of breath. Cardiovascular: Negative for chest pain and leg swelling. Gastrointestinal: Negative for abdominal pain, nausea and vomiting. Genitourinary: Negative. Musculoskeletal: Positive for myalgias. Negative for back pain and gait problem. Skin: Negative for color change and rash. Neurological: Positive for dizziness. Negative for weakness, light-headedness and headaches. Hematological: Does not bruise/bleed easily. All other systems reviewed and are negative. Physical Exam   Physical Exam  Vitals signs and nursing note reviewed. Constitutional:       General: He is not in acute distress. Appearance: Normal appearance. He is not ill-appearing or toxic-appearing. HENT:      Head: Normocephalic and atraumatic. Nose: Congestion present. Mouth/Throat:      Mouth: Mucous membranes are moist.   Eyes:      Extraocular Movements: Extraocular movements intact. Pupils: Pupils are equal, round, and reactive to light. Neck:      Musculoskeletal: Normal range of motion and neck supple. Cardiovascular:      Rate and Rhythm: Normal rate and regular rhythm. Heart sounds: No murmur. Pulmonary:      Effort: Pulmonary effort is normal. No respiratory distress. Breath sounds: Normal breath sounds. No wheezing. Comments: Frequent dry hacking cough on exam  Abdominal:      General: There is no distension. Palpations: Abdomen is soft. Tenderness: There is no abdominal tenderness. There is no guarding or rebound. Musculoskeletal: Normal range of motion. General: No swelling or tenderness. Right lower leg: No edema. Left lower leg: No edema. Skin:     General: Skin is warm and dry. Coloration: Skin is not pale. Findings: No erythema. Neurological:      General: No focal deficit present. Mental Status: He is alert and oriented to person, place, and time. Diagnostic Study Results     Labs -     Recent Results (from the past 12 hour(s))   CBC WITH AUTOMATED DIFF    Collection Time: 12/04/20  7:24 AM   Result Value Ref Range    WBC 4.4 4.1 - 11.1 K/uL    RBC 4.46 4.10 - 5.70 M/uL    HGB 13.0 12.1 - 17.0 g/dL    HCT 40.4 36.6 - 50.3 %    MCV 90.6 80.0 - 99.0 FL    MCH 29.1 26.0 - 34.0 PG    MCHC 32.2 30.0 - 36.5 g/dL    RDW 14.6 (H) 11.5 - 14.5 %    PLATELET 186 208 - 984 K/uL    MPV 11.8 8.9 - 12.9 FL    NRBC 0.0 0  WBC    ABSOLUTE NRBC 0.00 0.00 - 0.01 K/uL    NEUTROPHILS 58 32 - 75 %    LYMPHOCYTES 26 12 - 49 %    MONOCYTES 13 5 - 13 %    EOSINOPHILS 3 0 - 7 %    BASOPHILS 0 0 - 1 %    IMMATURE GRANULOCYTES 0 0.0 - 0.5 %    ABS. NEUTROPHILS 2.6 1.8 - 8.0 K/UL    ABS. LYMPHOCYTES 1.1 0.8 - 3.5 K/UL    ABS. MONOCYTES 0.6 0.0 - 1.0 K/UL    ABS. EOSINOPHILS 0.1 0.0 - 0.4 K/UL    ABS. BASOPHILS 0.0 0.0 - 0.1 K/UL    ABS. IMM.  GRANS. 0.0 0.00 - 0.04 K/UL    DF AUTOMATED     METABOLIC PANEL, COMPREHENSIVE Collection Time: 12/04/20  7:24 AM   Result Value Ref Range    Sodium 140 136 - 145 mmol/L    Potassium 4.2 3.5 - 5.1 mmol/L    Chloride 110 (H) 97 - 108 mmol/L    CO2 27 21 - 32 mmol/L    Anion gap 3 (L) 5 - 15 mmol/L    Glucose 101 (H) 65 - 100 mg/dL    BUN 12 6 - 20 MG/DL    Creatinine 1.20 0.70 - 1.30 MG/DL    BUN/Creatinine ratio 10 (L) 12 - 20      GFR est AA >60 >60 ml/min/1.73m2    GFR est non-AA >60 >60 ml/min/1.73m2    Calcium 9.1 8.5 - 10.1 MG/DL    Bilirubin, total 0.6 0.2 - 1.0 MG/DL    ALT (SGPT) 38 12 - 78 U/L    AST (SGOT) 49 (H) 15 - 37 U/L    Alk. phosphatase 67 45 - 117 U/L    Protein, total 7.4 6.4 - 8.2 g/dL    Albumin 3.7 3.5 - 5.0 g/dL    Globulin 3.7 2.0 - 4.0 g/dL    A-G Ratio 1.0 (L) 1.1 - 2.2     SARS-COV-2    Collection Time: 12/04/20  8:55 AM   Result Value Ref Range    Specimen source Nasopharyngeal      SARS-CoV-2 PENDING     SARS-CoV-2 PENDING     Specimen source Nasopharyngeal      COVID-19 rapid test PENDING     Specimen type NP Swab      Health status PENDING     COVID-19 PENDING        Radiologic Studies -   XR CHEST PORT   Final Result   IMPRESSION: Normal chest.        CT Results  (Last 48 hours)    None        CXR Results  (Last 48 hours)               12/04/20 0722  XR CHEST PORT Final result    Impression:  IMPRESSION: Normal chest.       Narrative:  EXAM: XR CHEST PORT       INDICATION: cough       COMPARISON: 10/6/2020       FINDINGS: A portable AP radiograph of the chest was obtained at 0713 hours. The   lungs are clear. The cardiac and mediastinal contours and pulmonary vascularity   are normal.  The bones and soft tissues are grossly within normal limits. Medical Decision Making   I am the first provider for this patient. I reviewed the vital signs, available nursing notes, past medical history, past surgical history, family history and social history. Vital Signs-Reviewed the patient's vital signs.   Patient Vitals for the past 12 hrs:   Temp Pulse Resp BP SpO2   12/04/20 0647 98 °F (36.7 °C) 85 18 (!) 198/165 100 %       Records Reviewed: Nursing Notes    Provider Notes (Medical Decision Making):   45-year-old male here with shortness of breath, cough, body aches, chills, diarrhea after positive COVID-19 exposure over Thanksgiving. On examination patient in no acute respiratory distress. He is afebrile and vital signs stable. O2 sat 100% on room air, no increased respiratory effort. He is noted to be hypertensive, he does have history of hypertension and is on medications. He has no symptoms related to this right now and states that he has been off his medication for the past 2 days because he has not been feeling well. I have high concern for COVID-19 infection. Will obtain SARS-CoV-2 swab. Chest x-ray does not show any acute infiltrate however. Blood work largely unremarkable. I will ensure that patient has vitamin C, zinc, albuterol inhaler for discharge. He was given strict return a precautions. Encouraged to use pulse oximeter at home and he was given strict return precautions for any worsening shortness of breath or hypoxia. Patient agreeable with plan as above and all questions answered. ED Course:   Initial assessment performed. The patients presenting problems have been discussed, and they are in agreement with the care plan formulated and outlined with them. I have encouraged them to ask questions as they arise throughout their visit. Discharge Note:  The patient has been re-evaluated and is ready for discharge. Reviewed available results with patient. Counseled patient on diagnosis and care plan. Patient has expressed understanding, and all questions have been answered. Patient agrees with plan and agrees to follow up as recommended, or to return to the ED if their symptoms worsen. Discharge instructions have been provided and explained to the patient, along with reasons to return to the ED.       Disposition:   Discharge home     DISCHARGE PLAN:  1. Current Discharge Medication List      START taking these medications    Details   ibuprofen (MOTRIN) 800 mg tablet Take 1 Tab by mouth every eight (8) hours as needed for Pain for up to 7 days. Qty: 20 Tab, Refills: 0      ascorbic acid, vitamin C, (Vitamin C) 500 mg tablet Take 1 Tab by mouth two (2) times a day. Qty: 10 Tab, Refills: 0      zinc sulfate 220 mg tablet Take 1 Tab by mouth daily. Qty: 5 Tab, Refills: 0      albuterol (PROVENTIL HFA, VENTOLIN HFA, PROAIR HFA) 90 mcg/actuation inhaler Take 2 Puffs by inhalation every four (4) hours as needed for Wheezing. Qty: 1 Inhaler, Refills: 0           2. Follow-up Information     Follow up With Specialties Details Why [address] Internal Medicine Schedule an appointment as soon as possible for a visit  to establish with a PCP [place] [address] [phone]    \[facility]\"" EMERGENCY DEPT Emergency Medicine Go to  As needed, If symptoms worsen, for worsening shortness of breath or O2 levels < 90% on pulse oximeter [address]  [phone]        3. Return to ED if worse     Diagnosis     Clinical Impression:   1. Exposure to SARS-associated coronavirus    2. Cough    3. Myalgia    4. Diarrhea, unspecified type        Attestations:    Oc Arellano MD    Please note that this dictation was completed with [employer], the computer voice recognition software. Quite often unanticipated grammatical, syntax, homophones, and other interpretive errors are inadvertently transcribed by the computer software. Please disregard these errors. Please excuse any errors that have escaped final proofreading. Thank you.

## 2020-12-04 NOTE — DISCHARGE INSTRUCTIONS
You were evaluated in the emergency department for cough, body aches, diarrhea, and concern for COVID19. Your examination was reassuring as was your work-up including chest xray and blood work. It will be important for you to follow-up with your primary care physician in 3-5 days. If you develop worsening symptoms such as worsening shortness of breath or O2 levels less than 90% at home, please return to the emergency department immediately.       Local Primary Care Physicians  [facility] Physicians [phone]  MD Ibeth Caballero MD Loree Mince, MD [facility] [phone]  Ronald Browne, [facility]  Janet Mathur, MD Juan Murguia MD [address] [phone]  MD Marisa Phillips MD [address] [facility] [phone]  MD Halle Byrd MD Coleridge Dec, MD Stephany Cesar MD   [facility] [phone]  [facility]IE GIM[facility]ZK[facility]tering [facility], MD Chandan Aldrich, NP [address] [phone]  MD Jason Way, MD Marci Mac, MD Paula Beaver, MD Jc Clarke, MD Shaista Grigsby MD   33 57 [facility]  Agus Gonzalez MD [address] [facility] [phone]  Anna Jefferson, MD Caridad Palacio, NP  Tammy Blum, MD Cleo Carrillo, MD Mj Camilo, MD Eric Cabrera, MD Lady Alberto MD   7986 [facility] [phone]  Yamileth Velasquez, MD Darius Moore, P  Hannah Traylor, ROB Kendrick, MD [place], MD Gerhardt Heaps, MD Ivana Harlem, MD [facility] [phone]  Lula Mcardle, MD Ricky Diego, MD Maria A Carlisle MD Fredrich Armour, MD   [address] [phone]  MD Dick Felipe MD [facility] [phone]  MD Danilo Alvarado MD [place], MD   [facility] [phone]  Helyn Mohs, MD Lenny Peal, MD Nicoletta Massed, MD Marlo Blazer, MD Rosilyn Grater, MD Margit Leiter, NP  Kleber Maldonado MD 3[address]  66 [phone]  Madge Bumpers, MD Unk Milks, MD Norville Lapping, MD   2109 [facility] [phone]  MD Lyle Hicks, YARIP  Derik Castle, PAMarcelloC  Derik Castle, YARIP  Royce Hodgkin, PA-C Felicita Francis, MD Janean Dopp, ROB Martins DO             Miscellaneous:  Ivon Tellez MD [facility] Departments     For adult and child immunizations, family planning, TB screening, STD testing and women's health services. [facility]: [place] [phone]      [facility] 25   657 [facility]   [address]   170 [facility] 200 [facility] [phone]      [facility]          [address] [place] [address]     For primary care services, woman and child wellness, and some clinics providing specialty care. [facility] -- [address] [place] [address] [facility] [phone]   411 [facility] 200 [facility] [address] [place] [address] [phone]   339 [facility] [address] [facility] [address] [phone] [address] [facility] 1604 [facility] 0050 [facility]  [phone]   [address] [place] [address] [address] [place] [phone]   [facility] 81 [facility] [phone]   Marija Schultz [facility] [address] [place] [address] [phone]   [facility]: [facility] keyon Saravia [address] [place] [address], [address] [phone]     [address] [phone]   [facility] Outreach 5850 [facility]  [phone]   [employer]  [address] ([email or web address]) [phone]         Sexual Health/Woman Wellness Clinics    For STD/HIV testing and treatment, pregnancy testing and services, men's health, birth control services, LGBT services, and hepatitis/HPV vaccine services. [employer] for [place] [employer] 201 [address] [facility] 75 [facility] 157 600 NENA Foster [phone]   [facility] [address] , 5th floor [phone]   Pregnancy 3928 [facility] [address] for Women [address]  [facility] [phone]         [place] [address] [facility] [address] [place] [address]   [phone]   [place]   [phone]   Women, Infant and Children's Services: [address] [phone]       [address] [place] [address]   [phone]   [address]   [facility] Psychiatry     [phone]   [address] Crisis   1212 [facility] [phone]

## 2020-12-21 ENCOUNTER — NURSE TRIAGE (OUTPATIENT)
Dept: OTHER | Facility: CLINIC | Age: 50
End: 2020-12-21

## 2020-12-21 NOTE — TELEPHONE ENCOUNTER
Pt calling to make new patient appointment. Pt sent to triage due to [place] exposure. Pt not having any symptoms. Transferred to [facility] and warm transferred pt to Wander Sánchez.

## 2021-01-06 ENCOUNTER — HOSPITAL ENCOUNTER (EMERGENCY)
Age: 51
Discharge: HOME | End: 2021-01-06
Attending: EMERGENCY MEDICINE

## 2021-01-06 VITALS
HEIGHT: 75 IN | WEIGHT: 244.71 LBS | HEART RATE: 82 BPM | TEMPERATURE: 98.1 F | OXYGEN SATURATION: 100 % | RESPIRATION RATE: 18 BRPM | DIASTOLIC BLOOD PRESSURE: 111 MMHG | SYSTOLIC BLOOD PRESSURE: 183 MMHG | BODY MASS INDEX: 30.43 KG/M2

## 2021-01-06 DIAGNOSIS — M10.9 ACUTE GOUT OF RIGHT HAND, UNSPECIFIED CAUSE: Primary | ICD-10-CM

## 2021-01-06 PROCEDURE — 74011250636 HC RX REV CODE- 250/636: Performed by: PHYSICIAN ASSISTANT

## 2021-01-06 PROCEDURE — 99283 EMERGENCY DEPT VISIT LOW MDM: CPT

## 2021-01-06 PROCEDURE — 96372 THER/PROPH/DIAG INJ SC/IM: CPT

## 2021-01-06 PROCEDURE — 74011250637 HC RX REV CODE- 250/637: Performed by: PHYSICIAN ASSISTANT

## 2021-01-06 RX ORDER — OXYCODONE AND ACETAMINOPHEN 5; 325 MG/1; MG/1
1 TABLET ORAL
Qty: 10 TAB | Refills: 0 | Status: SHIPPED | OUTPATIENT
Start: 2021-01-06 | End: 2021-01-09

## 2021-01-06 RX ORDER — ATENOLOL 25 MG/1
25 TABLET ORAL DAILY
Qty: 15 TAB | Refills: 0 | Status: SHIPPED | OUTPATIENT
Start: 2021-01-06 | End: 2021-01-28 | Stop reason: SDUPTHER

## 2021-01-06 RX ORDER — LISINOPRIL 10 MG/1
10 TABLET ORAL DAILY
Qty: 15 TAB | Refills: 0 | Status: SHIPPED | OUTPATIENT
Start: 2021-01-06 | End: 2021-01-28 | Stop reason: SDUPTHER

## 2021-01-06 RX ORDER — INDOMETHACIN 25 MG/1
50 CAPSULE ORAL
Status: COMPLETED | OUTPATIENT
Start: 2021-01-06 | End: 2021-01-06

## 2021-01-06 RX ORDER — OXYCODONE AND ACETAMINOPHEN 5; 325 MG/1; MG/1
2 TABLET ORAL
Status: COMPLETED | OUTPATIENT
Start: 2021-01-06 | End: 2021-01-06

## 2021-01-06 RX ORDER — INDOMETHACIN 50 MG/1
50 CAPSULE ORAL 3 TIMES DAILY
Qty: 30 CAP | Refills: 0 | Status: SHIPPED | OUTPATIENT
Start: 2021-01-06 | End: 2021-01-16

## 2021-01-06 RX ADMIN — OXYCODONE HYDROCHLORIDE AND ACETAMINOPHEN 2 TABLET: 5; 325 TABLET ORAL at 06:11

## 2021-01-06 RX ADMIN — METHYLPREDNISOLONE SODIUM SUCCINATE 125 MG: 125 INJECTION, POWDER, FOR SOLUTION INTRAMUSCULAR; INTRAVENOUS at 06:11

## 2021-01-06 RX ADMIN — INDOMETHACIN 50 MG: 25 CAPSULE ORAL at 06:10

## 2021-01-06 NOTE — LETTER
[address] 
[facility] EMERGENCY DEPT 
[address] [id number] [phone] Work/School Note Date: 1/6/2021 To Whom It May concern: 
 
Felisha Nascimento was seen and treated today in the emergency room by the following provider(s): 
Attending Provider: Gita Gillette MD 
Physician Assistant: Shaquille Ruiz. Felisha Nascimento may return to work on 1/7/2021.  
 
Sincerely,

## 2021-01-06 NOTE — ED PROVIDER NOTES
EMERGENCY DEPARTMENT HISTORY AND PHYSICAL EXAM      Date: 1/6/2021  Patient Name: Phyllis Albert    History of Presenting Illness     Chief Complaint   Patient presents with    Hand Pain     onset yesterday, started home treatment and did not have enough meds to complete theraoy and relieve pain       History Provided By: Patient    HPI: Phyllis Albert, 48 y.o. male with significant PMHx for gout and HTN, presents by POV to the ED with cc of right hand pain at the 2nd MCP joint. The pain is a constant and nonradiating pain that worsens with movement. There is been no history of trauma. The patient reports the pain feels very similar to his prior toxic gout. He denies any known precipitating events to flareup his gout. He did take indomethacin last night without relief. He reports that he normally comes to the ED to receive a shot of steroids, indomethacin, and Percocet. This cocktail typically works for his gout flares. There are no other complaints, changes, or physical findings at this time. Social Hx: Tobacco (denies), EtOH (social), Illicit drug use (denies)     PCP: Destiney, MD Leidy    No current facility-administered medications on file prior to encounter. Current Outpatient Medications on File Prior to Encounter   Medication Sig Dispense Refill    cetirizine (ZYRTEC) 10 mg tablet Take 1 Tab by mouth daily. 20 Tab 0    ascorbic acid, vitamin C, (Vitamin C) 500 mg tablet Take 1 Tab by mouth two (2) times a day. 10 Tab 0    zinc sulfate 220 mg tablet Take 1 Tab by mouth daily. 5 Tab 0    albuterol (PROVENTIL HFA, VENTOLIN HFA, PROAIR HFA) 90 mcg/actuation inhaler Take 2 Puffs by inhalation every four (4) hours as needed for Wheezing. 1 Inhaler 0    [DISCONTINUED] benzonatate (Tessalon Perles) 100 mg capsule Take 2 Caps by mouth three (3) times daily as needed for Cough for up to 15 doses. 15 Cap 0    [DISCONTINUED] atenolol (TENORMIN) 25 mg tablet Take 1 Tab by mouth daily.  30 Tab 0    [DISCONTINUED] lisinopril (PRINIVIL, ZESTRIL) 10 mg tablet Take 1 Tab by mouth daily. 30 Tab 0       Past History     Past Medical History:  Past Medical History:   Diagnosis Date    Gout     Hypertension     Other ill-defined conditions(799.89)     gout       Past Surgical History:  Past Surgical History:   Procedure Laterality Date    HX HEENT      eyes    HX ORTHOPAEDIC      right knee petella tendon repair       Family History:  Family History   Problem Relation Age of Onset    Diabetes Neg Hx        Social History:  Social History     Tobacco Use    Smoking status: Light Tobacco Smoker    Smokeless tobacco: Never Used   Substance Use Topics    Alcohol use: Yes     Alcohol/week: 14.0 standard drinks     Types: 14 Cans of beer per week     Comment: \"6pack beer day\"    Drug use: No       Allergies:  No Known Allergies      Review of Systems   Review of Systems   Constitutional: Negative for chills, diaphoresis and fever. HENT: Negative for congestion, ear pain, rhinorrhea and sore throat. Respiratory: Negative for cough and shortness of breath. Cardiovascular: Negative for chest pain. Gastrointestinal: Negative for abdominal pain, constipation, diarrhea, nausea and vomiting. Genitourinary: Negative for difficulty urinating, dysuria, frequency and hematuria. Musculoskeletal: Positive for arthralgias. Negative for myalgias. Neurological: Negative for headaches. All other systems reviewed and are negative. Physical Exam   Physical Exam  Vitals signs and nursing note reviewed. Constitutional:       General: He is not in acute distress. Appearance: He is well-developed. He is not diaphoretic. Comments: 48 y.o. -American male    HENT:      Head: Normocephalic and atraumatic. Eyes:      General:         Right eye: No discharge. Left eye: No discharge.       Conjunctiva/sclera: Conjunctivae normal.   Neck:      Musculoskeletal: Normal range of motion and neck supple. Cardiovascular:      Rate and Rhythm: Normal rate and regular rhythm. Pulses: Normal pulses. Heart sounds: Normal heart sounds. No murmur. Pulmonary:      Effort: Pulmonary effort is normal. No respiratory distress. Breath sounds: Normal breath sounds. Musculoskeletal:      Comments: R HAND: + swelling and tenderness to the 2nd MCP joint with pain on ROM. Distal NV intact. Cap refill < 2 sec. Ambulatory. Skin:     General: Skin is warm and dry. Neurological:      Mental Status: He is alert and oriented to person, place, and time. Psychiatric:         Behavior: Behavior normal.         Diagnostic Study Results     Labs - none    Radiologic Studies - none    Medical Decision Making   I am the first provider for this patient. I reviewed the vital signs, available nursing notes, past medical history, past surgical history, family history and social history. Vital Signs-Reviewed the patient's vital signs. Patient Vitals for the past 12 hrs:   Temp Pulse Resp BP SpO2   01/06/21 0529 98.1 °F (36.7 °C) 82 18 (!) 183/111 100 %       Records Reviewed: Nursing Notes and Old Medical Records   Reviewed records from December 2020. Provider Notes (Medical Decision Making):   Patient presents the ED with complaints of hand pain. Differential diagnosis include gout, arthritis, DJD. Will treat for gout secondary to pain. ED Course:   Initial assessment performed. The patients presenting problems have been discussed, and they are in agreement with the care plan formulated and outlined with them. I have encouraged them to ask questions as they arise throughout their visit.    6:09 AM  Discussed with patient at length the need for blood pressure re-evaluation and management with primary care. Discussed the long term sequelae for elevated blood pressure including blindness, CVA, MI, and renal failure/ dialysis. Pt agrees to follow up as directed.  The patient is currently out of his BP medications and is requesting refills of these until he can see his PCP. EM Yu           Critical Care Time: None    Disposition:  DISCHARGE NOTE:  6:10 AM  The pt is ready for discharge. The pt's signs, symptoms, diagnosis, and discharge instructions have been discussed and pt has conveyed their understanding. The pt is to follow up as recommended or return to ER should their symptoms worsen. Plan has been discussed and pt is in agreement. PLAN:  1. Current Discharge Medication List      START taking these medications    Details   oxyCODONE-acetaminophen (Percocet) 5-325 mg per tablet Take 1 Tab by mouth every six (6) hours as needed for Pain for up to 3 days. Max Daily Amount: 4 Tabs. Qty: 10 Tab, Refills: 0    Associated Diagnoses: Acute gout of right hand, unspecified cause      indomethacin (INDOCIN) 50 mg capsule Take 1 Cap by mouth three (3) times daily for 10 days. With food  Qty: 30 Cap, Refills: 0         CONTINUE these medications which have CHANGED    Details   atenoloL (TENORMIN) 25 mg tablet Take 1 Tab by mouth daily. Qty: 15 Tab, Refills: 0      lisinopriL (PRINIVIL, ZESTRIL) 10 mg tablet Take 1 Tab by mouth daily. Qty: 15 Tab, Refills: 0           2. Follow-up Information     Follow up With Specialties Details Why Contact Info    Your PCP  In 1 week          Return to ED if worse     Diagnosis     Clinical Impression:   1. Acute gout of right hand, unspecified cause          Please note that this dictation was completed with 3Scan, the computer voice recognition software. Quite often unanticipated grammatical, syntax, homophones, and other interpretive errors are inadvertently transcribed by the computer software. Please disregards these errors. Please excuse any errors that have escaped final proofreading.

## 2021-01-28 ENCOUNTER — OFFICE VISIT (OUTPATIENT)
Dept: FAMILY MEDICINE CLINIC | Age: 51
End: 2021-01-28

## 2021-01-28 VITALS
RESPIRATION RATE: 12 BRPM | WEIGHT: 247.2 LBS | TEMPERATURE: 97.8 F | OXYGEN SATURATION: 98 % | SYSTOLIC BLOOD PRESSURE: 171 MMHG | BODY MASS INDEX: 32.76 KG/M2 | HEIGHT: 73 IN | HEART RATE: 73 BPM | DIASTOLIC BLOOD PRESSURE: 114 MMHG

## 2021-01-28 DIAGNOSIS — Z23 NEEDS FLU SHOT: ICD-10-CM

## 2021-01-28 DIAGNOSIS — Z00.00 ANNUAL PHYSICAL EXAM: ICD-10-CM

## 2021-01-28 DIAGNOSIS — Z12.11 COLON CANCER SCREENING: Primary | ICD-10-CM

## 2021-01-28 PROCEDURE — 99396 PREV VISIT EST AGE 40-64: CPT | Performed by: FAMILY MEDICINE

## 2021-01-28 RX ORDER — NAPROXEN 500 MG/1
500 TABLET ORAL 2 TIMES DAILY WITH MEALS
Qty: 60 TAB | Refills: 3 | Status: SHIPPED | OUTPATIENT
Start: 2021-01-28

## 2021-01-28 RX ORDER — MONTELUKAST SODIUM 10 MG/1
TABLET ORAL
COMMUNITY
Start: 2020-12-09 | End: 2021-01-28 | Stop reason: ALTCHOICE

## 2021-01-28 RX ORDER — LISINOPRIL 10 MG/1
10 TABLET ORAL DAILY
Qty: 90 TAB | Refills: 3 | Status: SHIPPED | OUTPATIENT
Start: 2021-01-28

## 2021-01-28 RX ORDER — ATENOLOL 25 MG/1
25 TABLET ORAL DAILY
Qty: 90 TAB | Refills: 3 | Status: SHIPPED | OUTPATIENT
Start: 2021-01-28 | End: 2021-01-28 | Stop reason: ALTCHOICE

## 2021-01-28 RX ORDER — INDOMETHACIN 25 MG/1
CAPSULE ORAL
COMMUNITY
Start: 2020-12-02 | End: 2021-07-29 | Stop reason: SDUPTHER

## 2021-01-28 RX ORDER — ACETAMINOPHEN 500 MG
1000 TABLET ORAL
Qty: 60 TAB | Refills: 1 | Status: SHIPPED | OUTPATIENT
Start: 2021-01-28

## 2021-01-28 RX ORDER — CHLORTHALIDONE 25 MG/1
25 TABLET ORAL DAILY
Qty: 90 TAB | Refills: 3 | Status: SHIPPED | OUTPATIENT
Start: 2021-01-28 | End: 2021-10-25 | Stop reason: SDUPTHER

## 2021-01-28 NOTE — PROGRESS NOTES
HISTORY OF PRESENT ILLNESS  Jeimy Quezada is a 48 y.o. male. HPI In for physical. Has been having pain in R side of neck and R shoulder for 3 days. Has been out of bp meds. bc- mild relief. Pain can keep him awake at night. Father has had a stroke. MOther fairly healthy, she has had breast cancer    Review of Systems   Constitutional: Negative for malaise/fatigue and weight loss. HENT: Negative for hearing loss and tinnitus. Congestion: es tylenol. Eyes:        Just had retinal surgery   Respiratory: Negative for cough and shortness of breath. Nonsmoker   Cardiovascular: Negative for chest pain and orthopnea. Gastrointestinal: Negative for abdominal pain and blood in stool. Genitourinary: Negative for frequency and hematuria. Skin: Positive for itching. Negative for rash. Bad itching in both feet. Doesn't seem to be related to a certain pair of shoes. Neurological: Negative for loss of consciousness and weakness. Was told once that needed a sleep apnea. Has been told that snores real loud. Endo/Heme/Allergies: Negative for polydipsia. Some polydipsia   Psychiatric/Behavioral: Negative for depression. The patient does not have insomnia. Physical Exam  Vitals signs and nursing note reviewed. Constitutional:       Appearance: He is well-developed. HENT:      Right Ear: External ear normal.      Left Ear: External ear normal.   Neck:      Thyroid: No thyromegaly. Cardiovascular:      Rate and Rhythm: Normal rate and regular rhythm. Heart sounds: Normal heart sounds. Pulmonary:      Effort: Pulmonary effort is normal. No respiratory distress. Breath sounds: Normal breath sounds. No wheezing. Abdominal:      General: Bowel sounds are normal. There is no distension. Palpations: Abdomen is soft. There is no mass. Tenderness: There is no abdominal tenderness. There is no guarding. Musculoskeletal: Normal range of motion.       Comments: Neck- markedly decreased rom. Moderate tenderness R side of neck  R shoulder - full rom   Lymphadenopathy:      Cervical: No cervical adenopathy. ASSESSMENT and PLAN  Orders Placed This Encounter    CBC WITH AUTOMATED DIFF    METABOLIC PANEL, COMPREHENSIVE    LIPID PANEL    PROSTATE SPECIFIC AG    HIV 1/2 AG/AB, 4TH GENERATION,W RFLX CONFIRM    [facility]    DISCONTD: atenoloL (TENORMIN) 25 mg tablet    lisinopriL (PRINIVIL, ZESTRIL) 10 mg tablet    naproxen (NAPROSYN) 500 mg tablet    acetaminophen (TYLENOL) 500 mg tablet    chlorthalidone (HYGROTON) 25 mg tablet     Diagnoses and all orders for this visit:    1. Colon cancer screening  -     REFERRAL TO GASTROENTEROLOGY    2. Annual physical exam  -     CBC WITH AUTOMATED DIFF; Future  -     METABOLIC PANEL, COMPREHENSIVE; Future  -     LIPID PANEL; Future  -     PSA, DIAGNOSTIC (PROSTATE SPECIFIC AG); Future  -     HIV 1/2 AG/AB, 4TH GENERATION,W RFLX CONFIRM; Future    3. Needs flu shot    Other orders  -     lisinopriL (PRINIVIL, ZESTRIL) 10 mg tablet; Take 1 Tab by mouth daily.  -     naproxen (NAPROSYN) 500 mg tablet; Take 1 Tab by mouth two (2) times daily (with meals). For neck pain  -     acetaminophen (TYLENOL) 500 mg tablet; Take 2 Tabs by mouth every twelve (12) hours as needed for Pain. Take 2 tabs with one naproxen for severe pain  -     chlorthalidone (HYGROTON) 25 mg tablet; Take 1 Tab by mouth daily. For blood pressure      Follow-up and Dispositions    · Return in about 4 weeks (around 2/25/2021).

## 2021-01-28 NOTE — LETTER
NOTIFICATION RETURN TO WORK / SCHOOL 
 
1/28/2021 10:59 AM 
 
[facility] [address] [facility] [facility] [phone] To Whom It May Concern: 
 
Rossy Betts is currently under the care of [place] [facility]. He will return to work/school on: 1-. His blood pressure was 170/114- he was started on additional medicine. If there are questions or concerns please have the patient contact our office.  
 
 
 
Sincerely, 
 
 
Adi Hurst MD

## 2021-01-28 NOTE — PROGRESS NOTES
Chief Complaint   Patient presents with   [facility]     Pt concerned with right arm pain x 3-4 days. 1. Have you been to the ER, urgent care clinic since your last visit? Hospitalized since your last visit? No    2. Have you seen or consulted any other health care providers outside of the [address] [place] [address] since your last visit? Include any pap smears or colon screening.  No    Flu shot - Pt declined    Health Maintenance Due   Topic Date Due    Pneumococcal 0-64 years (1 of 1 - PPSV23) 12/09/1976    COVID-19 Vaccine (1 of 2) 12/09/1986    DTaP/Tdap/Td series (1 - Tdap) 12/09/1991    Lipid Screen  09/19/2019    Flu Vaccine (1) 09/01/2020    Shingrix Vaccine Age 50> (1 of 2) 12/09/2020    Colorectal Cancer Screening Combo  12/09/2020

## 2021-01-28 NOTE — LETTER
NOTIFICATION RETURN TO WORK / SCHOOL 
 
1/28/2021 10:53 AM 
 
[facility] [address] [facility] [phone] To Whom It May Concern: 
 
hPilip Issa is currently under the care of [facility]. He will return to work/school on: 1- If there are questions or concerns please have the patient contact our office.  
 
 
 
Sincerely, 
 
 
Sanket Rivera MD

## 2021-01-29 ENCOUNTER — HOSPITAL ENCOUNTER (EMERGENCY)
Age: 51
Discharge: LEFT BEFORE DISCHARGE | End: 2021-01-29
Attending: EMERGENCY MEDICINE

## 2021-01-29 ENCOUNTER — APPOINTMENT (OUTPATIENT)
Dept: GENERAL RADIOLOGY | Age: 51
End: 2021-01-29
Attending: INTERNAL MEDICINE

## 2021-01-29 ENCOUNTER — TELEPHONE (OUTPATIENT)
Dept: FAMILY MEDICINE CLINIC | Age: 51
End: 2021-01-29

## 2021-01-29 ENCOUNTER — HOSPITAL ENCOUNTER (OUTPATIENT)
Age: 51
Setting detail: OBSERVATION
Discharge: LEFT BEFORE DISCHARGE | End: 2021-01-30
Attending: EMERGENCY MEDICINE | Admitting: INTERNAL MEDICINE

## 2021-01-29 VITALS
SYSTOLIC BLOOD PRESSURE: 181 MMHG | BODY MASS INDEX: 30.29 KG/M2 | HEART RATE: 73 BPM | DIASTOLIC BLOOD PRESSURE: 111 MMHG | TEMPERATURE: 98 F | OXYGEN SATURATION: 100 % | RESPIRATION RATE: 18 BRPM | WEIGHT: 243.61 LBS | HEIGHT: 75 IN

## 2021-01-29 DIAGNOSIS — Z53.21 PATIENT LEFT WITHOUT BEING SEEN: Primary | ICD-10-CM

## 2021-01-29 DIAGNOSIS — I10 ESSENTIAL HYPERTENSION: ICD-10-CM

## 2021-01-29 DIAGNOSIS — F10.10 ALCOHOL ABUSE: ICD-10-CM

## 2021-01-29 DIAGNOSIS — I21.4 NSTEMI (NON-ST ELEVATED MYOCARDIAL INFARCTION) (HCC): Primary | ICD-10-CM

## 2021-01-29 LAB
ALBUMIN SERPL-MCNC: 4.2 G/DL (ref 3.5–5)
ALBUMIN SERPL-MCNC: 4.4 G/DL (ref 4–5)
ALBUMIN/GLOB SERPL: 1.1 {RATIO} (ref 1.1–2.2)
ALBUMIN/GLOB SERPL: 1.5 {RATIO} (ref 1.2–2.2)
ALP SERPL-CCNC: 61 U/L (ref 45–117)
ALP SERPL-CCNC: 62 IU/L (ref 39–117)
ALT SERPL-CCNC: 20 IU/L (ref 0–44)
ALT SERPL-CCNC: 34 U/L (ref 12–78)
ANION GAP SERPL CALC-SCNC: 4 MMOL/L (ref 5–15)
APPEARANCE UR: CLEAR
AST SERPL-CCNC: 28 IU/L (ref 0–40)
AST SERPL-CCNC: 45 U/L (ref 15–37)
ATRIAL RATE: 68 BPM
BACTERIA URNS QL MICRO: NEGATIVE /HPF
BASOPHILS # BLD AUTO: 0.1 X10E3/UL (ref 0–0.2)
BASOPHILS # BLD: 0 K/UL (ref 0–0.1)
BASOPHILS NFR BLD AUTO: 1 %
BASOPHILS NFR BLD: 1 % (ref 0–1)
BILIRUB SERPL-MCNC: 0.6 MG/DL (ref 0–1.2)
BILIRUB SERPL-MCNC: 0.7 MG/DL (ref 0.2–1)
BILIRUB UR QL: NEGATIVE
BUN SERPL-MCNC: 13 MG/DL (ref 6–20)
BUN SERPL-MCNC: 13 MG/DL (ref 6–24)
BUN/CREAT SERPL: 11 (ref 12–20)
BUN/CREAT SERPL: 12 (ref 9–20)
CALCIUM SERPL-MCNC: 8.9 MG/DL (ref 8.5–10.1)
CALCIUM SERPL-MCNC: 9.3 MG/DL (ref 8.7–10.2)
CALCULATED P AXIS, ECG09: 5 DEGREES
CALCULATED R AXIS, ECG10: -10 DEGREES
CALCULATED T AXIS, ECG11: -32 DEGREES
CHLORIDE SERPL-SCNC: 106 MMOL/L (ref 96–106)
CHLORIDE SERPL-SCNC: 107 MMOL/L (ref 97–108)
CHOLEST SERPL-MCNC: 223 MG/DL (ref 100–199)
CO2 SERPL-SCNC: 22 MMOL/L (ref 20–29)
CO2 SERPL-SCNC: 27 MMOL/L (ref 21–32)
COLOR UR: NORMAL
CREAT SERPL-MCNC: 1.13 MG/DL (ref 0.76–1.27)
CREAT SERPL-MCNC: 1.16 MG/DL (ref 0.7–1.3)
DIAGNOSIS, 93000: NORMAL
DIFFERENTIAL METHOD BLD: NORMAL
EOSINOPHIL # BLD AUTO: 0.4 X10E3/UL (ref 0–0.4)
EOSINOPHIL # BLD: 0.3 K/UL (ref 0–0.4)
EOSINOPHIL NFR BLD AUTO: 9 %
EOSINOPHIL NFR BLD: 7 % (ref 0–7)
EPITH CASTS URNS QL MICRO: NORMAL /LPF
ERYTHROCYTE [DISTWIDTH] IN BLOOD BY AUTOMATED COUNT: 13.6 % (ref 11.6–15.4)
ERYTHROCYTE [DISTWIDTH] IN BLOOD BY AUTOMATED COUNT: 14.3 % (ref 11.5–14.5)
GLOBULIN SER CALC-MCNC: 2.9 G/DL (ref 1.5–4.5)
GLOBULIN SER CALC-MCNC: 3.8 G/DL (ref 2–4)
GLUCOSE SERPL-MCNC: 77 MG/DL (ref 65–100)
GLUCOSE SERPL-MCNC: 90 MG/DL (ref 65–99)
GLUCOSE UR STRIP.AUTO-MCNC: NEGATIVE MG/DL
HCT VFR BLD AUTO: 38.6 % (ref 36.6–50.3)
HCT VFR BLD AUTO: 38.8 % (ref 37.5–51)
HDLC SERPL-MCNC: 64 MG/DL
HGB BLD-MCNC: 12.7 G/DL (ref 12.1–17)
HGB BLD-MCNC: 13 G/DL (ref 13–17.7)
HGB UR QL STRIP: NEGATIVE
HIV 1+2 AB+HIV1 P24 AG SERPL QL IA: NON REACTIVE
HYALINE CASTS URNS QL MICRO: NORMAL /LPF (ref 0–5)
IMM GRANULOCYTES # BLD AUTO: 0 K/UL (ref 0–0.04)
IMM GRANULOCYTES # BLD AUTO: 0 X10E3/UL (ref 0–0.1)
IMM GRANULOCYTES NFR BLD AUTO: 0 %
IMM GRANULOCYTES NFR BLD AUTO: 0 % (ref 0–0.5)
INTERPRETATION, 910389: NORMAL
KETONES UR QL STRIP.AUTO: NEGATIVE MG/DL
LDLC SERPL CALC-MCNC: 111 MG/DL (ref 0–99)
LEUKOCYTE ESTERASE UR QL STRIP.AUTO: NEGATIVE
LYMPHOCYTES # BLD AUTO: 1 X10E3/UL (ref 0.7–3.1)
LYMPHOCYTES # BLD: 1.2 K/UL (ref 0.8–3.5)
LYMPHOCYTES NFR BLD AUTO: 25 %
LYMPHOCYTES NFR BLD: 27 % (ref 12–49)
MCH RBC QN AUTO: 29.3 PG (ref 26–34)
MCH RBC QN AUTO: 29.5 PG (ref 26.6–33)
MCHC RBC AUTO-ENTMCNC: 32.9 G/DL (ref 30–36.5)
MCHC RBC AUTO-ENTMCNC: 33.5 G/DL (ref 31.5–35.7)
MCV RBC AUTO: 88 FL (ref 79–97)
MCV RBC AUTO: 88.9 FL (ref 80–99)
MONOCYTES # BLD AUTO: 0.5 X10E3/UL (ref 0.1–0.9)
MONOCYTES # BLD: 0.5 K/UL (ref 0–1)
MONOCYTES NFR BLD AUTO: 13 %
MONOCYTES NFR BLD: 11 % (ref 5–13)
NEUTROPHILS # BLD AUTO: 2.1 X10E3/UL (ref 1.4–7)
NEUTROPHILS NFR BLD AUTO: 52 %
NEUTS SEG # BLD: 2.4 K/UL (ref 1.8–8)
NEUTS SEG NFR BLD: 54 % (ref 32–75)
NITRITE UR QL STRIP.AUTO: NEGATIVE
NRBC # BLD: 0 K/UL (ref 0–0.01)
NRBC BLD-RTO: 0 PER 100 WBC
P-R INTERVAL, ECG05: 204 MS
PH UR STRIP: 6 [PH] (ref 5–8)
PLATELET # BLD AUTO: 166 X10E3/UL (ref 150–450)
PLATELET # BLD AUTO: 202 K/UL (ref 150–400)
PMV BLD AUTO: 11.9 FL (ref 8.9–12.9)
POTASSIUM SERPL-SCNC: 4.4 MMOL/L (ref 3.5–5.1)
POTASSIUM SERPL-SCNC: 4.6 MMOL/L (ref 3.5–5.2)
PROT SERPL-MCNC: 7.3 G/DL (ref 6–8.5)
PROT SERPL-MCNC: 8 G/DL (ref 6.4–8.2)
PROT UR STRIP-MCNC: NEGATIVE MG/DL
PSA SERPL-MCNC: 0.8 NG/ML (ref 0–4)
Q-T INTERVAL, ECG07: 390 MS
QRS DURATION, ECG06: 96 MS
QTC CALCULATION (BEZET), ECG08: 414 MS
RBC # BLD AUTO: 4.34 M/UL (ref 4.1–5.7)
RBC # BLD AUTO: 4.41 X10E6/UL (ref 4.14–5.8)
RBC #/AREA URNS HPF: NORMAL /HPF (ref 0–5)
SODIUM SERPL-SCNC: 138 MMOL/L (ref 136–145)
SODIUM SERPL-SCNC: 139 MMOL/L (ref 134–144)
SP GR UR REFRACTOMETRY: 1 (ref 1–1.03)
TRIGL SERPL-MCNC: 280 MG/DL (ref 0–149)
TROPONIN I SERPL-MCNC: 0.21 NG/ML
TROPONIN I SERPL-MCNC: 0.24 NG/ML
UA: UC IF INDICATED,UAUC: NORMAL
UROBILINOGEN UR QL STRIP.AUTO: 0.2 EU/DL (ref 0.2–1)
VENTRICULAR RATE, ECG03: 68 BPM
VLDLC SERPL CALC-MCNC: 48 MG/DL (ref 5–40)
WBC # BLD AUTO: 4 X10E3/UL (ref 3.4–10.8)
WBC # BLD AUTO: 4.5 K/UL (ref 4.1–11.1)
WBC URNS QL MICRO: NORMAL /HPF (ref 0–4)

## 2021-01-29 PROCEDURE — 96375 TX/PRO/DX INJ NEW DRUG ADDON: CPT

## 2021-01-29 PROCEDURE — 74011250637 HC RX REV CODE- 250/637: Performed by: INTERNAL MEDICINE

## 2021-01-29 PROCEDURE — 72040 X-RAY EXAM NECK SPINE 2-3 VW: CPT

## 2021-01-29 PROCEDURE — 99285 EMERGENCY DEPT VISIT HI MDM: CPT

## 2021-01-29 PROCEDURE — 80053 COMPREHEN METABOLIC PANEL: CPT

## 2021-01-29 PROCEDURE — 93005 ELECTROCARDIOGRAM TRACING: CPT

## 2021-01-29 PROCEDURE — 75810000275 HC EMERGENCY DEPT VISIT NO LEVEL OF CARE

## 2021-01-29 PROCEDURE — 99218 HC RM OBSERVATION: CPT

## 2021-01-29 PROCEDURE — 74011250637 HC RX REV CODE- 250/637: Performed by: EMERGENCY MEDICINE

## 2021-01-29 PROCEDURE — 96372 THER/PROPH/DIAG INJ SC/IM: CPT

## 2021-01-29 PROCEDURE — 74011000250 HC RX REV CODE- 250: Performed by: EMERGENCY MEDICINE

## 2021-01-29 PROCEDURE — 36415 COLL VENOUS BLD VENIPUNCTURE: CPT

## 2021-01-29 PROCEDURE — 96374 THER/PROPH/DIAG INJ IV PUSH: CPT

## 2021-01-29 PROCEDURE — 81001 URINALYSIS AUTO W/SCOPE: CPT

## 2021-01-29 PROCEDURE — 84484 ASSAY OF TROPONIN QUANT: CPT

## 2021-01-29 PROCEDURE — 65660000000 HC RM CCU STEPDOWN

## 2021-01-29 PROCEDURE — 74011250637 HC RX REV CODE- 250/637: Performed by: NURSE PRACTITIONER

## 2021-01-29 PROCEDURE — 74011250636 HC RX REV CODE- 250/636: Performed by: INTERNAL MEDICINE

## 2021-01-29 PROCEDURE — 85025 COMPLETE CBC W/AUTO DIFF WBC: CPT

## 2021-01-29 PROCEDURE — 74011250636 HC RX REV CODE- 250/636: Performed by: EMERGENCY MEDICINE

## 2021-01-29 RX ORDER — ACETAMINOPHEN 325 MG/1
650 TABLET ORAL
Status: DISCONTINUED | OUTPATIENT
Start: 2021-01-29 | End: 2021-01-30 | Stop reason: HOSPADM

## 2021-01-29 RX ORDER — LISINOPRIL 5 MG/1
10 TABLET ORAL DAILY
Status: DISCONTINUED | OUTPATIENT
Start: 2021-01-30 | End: 2021-01-30 | Stop reason: HOSPADM

## 2021-01-29 RX ORDER — SODIUM CHLORIDE 0.9 % (FLUSH) 0.9 %
5-40 SYRINGE (ML) INJECTION EVERY 8 HOURS
Status: DISCONTINUED | OUTPATIENT
Start: 2021-01-29 | End: 2021-01-30 | Stop reason: HOSPADM

## 2021-01-29 RX ORDER — LISINOPRIL 5 MG/1
10 TABLET ORAL DAILY
Status: CANCELLED | OUTPATIENT
Start: 2021-01-30

## 2021-01-29 RX ORDER — TRAMADOL HYDROCHLORIDE 50 MG/1
50 TABLET ORAL
Status: DISCONTINUED | OUTPATIENT
Start: 2021-01-29 | End: 2021-01-29

## 2021-01-29 RX ORDER — ENOXAPARIN SODIUM 150 MG/ML
1 INJECTION SUBCUTANEOUS
Status: COMPLETED | OUTPATIENT
Start: 2021-01-29 | End: 2021-01-29

## 2021-01-29 RX ORDER — LABETALOL HYDROCHLORIDE 5 MG/ML
20 INJECTION, SOLUTION INTRAVENOUS
Status: COMPLETED | OUTPATIENT
Start: 2021-01-29 | End: 2021-01-29

## 2021-01-29 RX ORDER — GUAIFENESIN 100 MG/5ML
81 LIQUID (ML) ORAL DAILY
Status: DISCONTINUED | OUTPATIENT
Start: 2021-01-30 | End: 2021-01-30 | Stop reason: HOSPADM

## 2021-01-29 RX ORDER — ONDANSETRON 2 MG/ML
4 INJECTION INTRAMUSCULAR; INTRAVENOUS
Status: DISCONTINUED | OUTPATIENT
Start: 2021-01-29 | End: 2021-01-30 | Stop reason: HOSPADM

## 2021-01-29 RX ORDER — LABETALOL HYDROCHLORIDE 5 MG/ML
10 INJECTION, SOLUTION INTRAVENOUS
Status: DISCONTINUED | OUTPATIENT
Start: 2021-01-29 | End: 2021-01-30 | Stop reason: HOSPADM

## 2021-01-29 RX ORDER — GUAIFENESIN 100 MG/5ML
325 LIQUID (ML) ORAL
Status: COMPLETED | OUTPATIENT
Start: 2021-01-29 | End: 2021-01-29

## 2021-01-29 RX ORDER — POLYETHYLENE GLYCOL 3350 17 G/17G
17 POWDER, FOR SOLUTION ORAL DAILY PRN
Status: DISCONTINUED | OUTPATIENT
Start: 2021-01-29 | End: 2021-01-30 | Stop reason: HOSPADM

## 2021-01-29 RX ORDER — ACETAMINOPHEN 650 MG/1
650 SUPPOSITORY RECTAL
Status: DISCONTINUED | OUTPATIENT
Start: 2021-01-29 | End: 2021-01-30 | Stop reason: HOSPADM

## 2021-01-29 RX ORDER — SODIUM CHLORIDE 0.9 % (FLUSH) 0.9 %
5-40 SYRINGE (ML) INJECTION AS NEEDED
Status: DISCONTINUED | OUTPATIENT
Start: 2021-01-29 | End: 2021-01-30 | Stop reason: HOSPADM

## 2021-01-29 RX ORDER — NITROGLYCERIN 0.4 MG/1
0.4 TABLET SUBLINGUAL
Status: COMPLETED | OUTPATIENT
Start: 2021-01-29 | End: 2021-01-29

## 2021-01-29 RX ORDER — PROMETHAZINE HYDROCHLORIDE 25 MG/1
12.5 TABLET ORAL
Status: DISCONTINUED | OUTPATIENT
Start: 2021-01-29 | End: 2021-01-30 | Stop reason: HOSPADM

## 2021-01-29 RX ORDER — OXYCODONE AND ACETAMINOPHEN 5; 325 MG/1; MG/1
1 TABLET ORAL
Status: DISCONTINUED | OUTPATIENT
Start: 2021-01-29 | End: 2021-01-30 | Stop reason: HOSPADM

## 2021-01-29 RX ADMIN — ONDANSETRON 4 MG: 2 INJECTION INTRAMUSCULAR; INTRAVENOUS at 21:06

## 2021-01-29 RX ADMIN — ENOXAPARIN SODIUM 120 MG: 120 INJECTION SUBCUTANEOUS at 17:22

## 2021-01-29 RX ADMIN — NITROGLYCERIN 0.4 MG: 0.4 TABLET, ORALLY DISINTEGRATING SUBLINGUAL at 17:22

## 2021-01-29 RX ADMIN — NITROGLYCERIN 1 INCH: 20 OINTMENT TOPICAL at 23:23

## 2021-01-29 RX ADMIN — Medication 40 ML: at 21:06

## 2021-01-29 RX ADMIN — OXYCODONE AND ACETAMINOPHEN 1 TABLET: 5; 325 TABLET ORAL at 21:50

## 2021-01-29 RX ADMIN — ACETAMINOPHEN 650 MG: 325 TABLET ORAL at 21:06

## 2021-01-29 RX ADMIN — TRAMADOL HYDROCHLORIDE 50 MG: 50 TABLET, COATED ORAL at 19:24

## 2021-01-29 RX ADMIN — ASPIRIN 324 MG: 81 TABLET, CHEWABLE ORAL at 17:22

## 2021-01-29 RX ADMIN — LABETALOL HYDROCHLORIDE 20 MG: 5 INJECTION INTRAVENOUS at 17:22

## 2021-01-29 NOTE — TELEPHONE ENCOUNTER
----- Message from Kesha Grier sent at 1/29/2021 12:27 PM EST -----  Regarding: Dr. April Kirkpatrick Message/Vendor Calls    Caller's first and last name: pt      Reason for call: Medication not helping      Callback required yes/no and why: yes, to confirm      Best contact number(s): [phone]        Details to clarify the request: pt calling the medication  Prescribed yesterday for pain is not helping and he stated the pain was unbearable and wanted to know if he could get something stronger.       Kehsa Grier

## 2021-01-29 NOTE — ED PROVIDER NOTES
EMERGENCY DEPARTMENT HISTORY AND PHYSICAL EXAM      Date: 1/29/2021  Patient Name: Thom Hyman  Patient Age and Sex: 48 y.o. male     History of Presenting Illness     Chief Complaint   Patient presents with    Abnormal Lab Results     Pt was just seen here earlier today for neck and right shoulder pain but had to leave for personal reasons. Pt got a call from the ED stating that he had an abnormal lab result and was told to come back to the ED. History Provided By: Patient    HPI: Thom Hyman is a 80-year-old male presenting with neck pain. Patient states that for the past 3 or 4 days has been having some right-sided neck pain. He went to see his primary care doctor yesterday given that he has been out of his blood pressure medications and was not able to get a refill until he saw his primary care. States he was restarted on lisinopril 10 mg as well as chlorthalidone but his blood pressures have continued to be high. He will was also seen for this neck pain and they thought it was possibly a pinched nerve in his right neck. States that he has been taking Tylenol with no relief to that area. It does hurt when he moves his neck a certain way. He denies any chest pain, shortness of breath, nausea, vomiting or diaphoresis. He does not have a cardiologist.    There are no other complaints, changes, or physical findings at this time. PCP: Gene Newell MD    No current facility-administered medications on file prior to encounter. Current Outpatient Medications on File Prior to Encounter   Medication Sig Dispense Refill    indomethacin (INDOCIN) 25 mg capsule TAKE 1 CAPSULE BY MOUTH THREE TIMES DAILY AS NEEDED      lisinopriL (PRINIVIL, ZESTRIL) 10 mg tablet Take 1 Tab by mouth daily. 90 Tab 3    naproxen (NAPROSYN) 500 mg tablet Take 1 Tab by mouth two (2) times daily (with meals).  For neck pain 60 Tab 3    acetaminophen (TYLENOL) 500 mg tablet Take 2 Tabs by mouth every twelve (12) hours as needed for Pain. Take 2 tabs with one naproxen for severe pain 60 Tab 1    chlorthalidone (HYGROTON) 25 mg tablet Take 1 Tab by mouth daily. For blood pressure 90 Tab 3    cetirizine (ZYRTEC) 10 mg tablet Take 1 Tab by mouth daily. 20 Tab 0       Past History     Past Medical History:  Past Medical History:   Diagnosis Date    Gout     H/O seasonal allergies     Hypertension     Other ill-defined conditions(799.89)     gout       Past Surgical History:  Past Surgical History:   Procedure Laterality Date    HX HEENT      eyes    HX ORTHOPAEDIC      right knee petella tendon repair    HX RETINAL DETACHMENT REPAIR         Family History:  Family History   Problem Relation Age of Onset    Diabetes Neg Hx        Social History:  Social History     Tobacco Use    Smoking status: Former Smoker     Types: Cigarettes    Smokeless tobacco: Never Used   Substance Use Topics    Alcohol use: Yes     Alcohol/week: 14.0 standard drinks     Types: 14 Cans of beer per week     Frequency: 2-3 times a week     Drinks per session: 5 or 6     Binge frequency: Weekly     Comment: \"6pack beer day\"    Drug use: No       Allergies: Allergies   Allergen Reactions    Tramadol Nausea and Vomiting         Review of Systems   Review of Systems   Constitutional: Negative for chills and fever. Respiratory: Negative for cough and shortness of breath. Cardiovascular: Negative for chest pain. Gastrointestinal: Negative for abdominal pain, constipation, diarrhea, nausea and vomiting. Genitourinary: Negative for dysuria, frequency and hematuria. Musculoskeletal: Positive for neck pain. Neurological: Negative for weakness and numbness. All other systems reviewed and are negative. Physical Exam   Physical Exam  Vitals signs and nursing note reviewed. Constitutional:       Appearance: He is well-developed. HENT:      Head: Normocephalic and atraumatic.       Nose: Nose normal.      Mouth/Throat:      Mouth: Mucous membranes are moist.   Eyes:      Extraocular Movements: Extraocular movements intact. Conjunctiva/sclera: Conjunctivae normal.   Neck:      Musculoskeletal: Normal range of motion and neck supple. Cardiovascular:      Rate and Rhythm: Normal rate and regular rhythm. Pulmonary:      Effort: Pulmonary effort is normal. No respiratory distress. Breath sounds: Normal breath sounds. Abdominal:      General: There is no distension. Palpations: Abdomen is soft. Tenderness: There is no abdominal tenderness. Musculoskeletal: Normal range of motion. Comments: No right-sided neck tenderness or trapezius muscle tenderness however pain is elicited when he rotates his neck to the right. Skin:     General: Skin is warm and dry. Neurological:      General: No focal deficit present. Mental Status: He is alert and oriented to person, place, and time. Mental status is at baseline.    Psychiatric:         Mood and Affect: Mood normal.          Diagnostic Study Results     Labs -     Recent Results (from the past 12 hour(s))   EKG, 12 LEAD, INITIAL    Collection Time: 01/29/21  2:48 PM   Result Value Ref Range    Ventricular Rate 68 BPM    Atrial Rate 68 BPM    P-R Interval 204 ms    QRS Duration 96 ms    Q-T Interval 390 ms    QTC Calculation (Bezet) 414 ms    Calculated P Axis 5 degrees    Calculated R Axis -10 degrees    Calculated T Axis -32 degrees    Diagnosis       Normal sinus rhythm  Nonspecific T wave abnormality  When compared with ECG of 06-OCT-2020 08:28,  No significant change was found  Confirmed by Kacie Pinto MD, [facility] ([id number]) on 1/29/2021 4:34:13 PM     URINALYSIS W/ REFLEX CULTURE    Collection Time: 01/29/21  2:54 PM    Specimen: Urine   Result Value Ref Range    Color YELLOW/STRAW      Appearance CLEAR CLEAR      Specific gravity 1.005 1.003 - 1.030      pH (UA) 6.0 5.0 - 8.0      Protein Negative NEG mg/dL    Glucose Negative NEG mg/dL    Ketone Negative NEG mg/dL Bilirubin Negative NEG      Blood Negative NEG      Urobilinogen 0.2 0.2 - 1.0 EU/dL    Nitrites Negative NEG      Leukocyte Esterase Negative NEG      WBC 0-4 0 - 4 /hpf    RBC 0-5 0 - 5 /hpf    Epithelial cells FEW FEW /lpf    Bacteria Negative NEG /hpf    UA:UC IF INDICATED CULTURE NOT INDICATED BY UA RESULT CNI      Hyaline cast 0-2 0 - 5 /lpf   METABOLIC PANEL, COMPREHENSIVE    Collection Time: 01/29/21  3:00 PM   Result Value Ref Range    Sodium 138 136 - 145 mmol/L    Potassium 4.4 3.5 - 5.1 mmol/L    Chloride 107 97 - 108 mmol/L    CO2 27 21 - 32 mmol/L    Anion gap 4 (L) 5 - 15 mmol/L    Glucose 77 65 - 100 mg/dL    BUN 13 6 - 20 MG/DL    Creatinine 1.16 0.70 - 1.30 MG/DL    BUN/Creatinine ratio 11 (L) 12 - 20      GFR est AA >60 >60 ml/min/1.73m2    GFR est non-AA >60 >60 ml/min/1.73m2    Calcium 8.9 8.5 - 10.1 MG/DL    Bilirubin, total 0.7 0.2 - 1.0 MG/DL    ALT (SGPT) 34 12 - 78 U/L    AST (SGOT) 45 (H) 15 - 37 U/L    Alk. phosphatase 61 45 - 117 U/L    Protein, total 8.0 6.4 - 8.2 g/dL    Albumin 4.2 3.5 - 5.0 g/dL    Globulin 3.8 2.0 - 4.0 g/dL    A-G Ratio 1.1 1.1 - 2.2     CBC WITH AUTOMATED DIFF    Collection Time: 01/29/21  3:00 PM   Result Value Ref Range    WBC 4.5 4.1 - 11.1 K/uL    RBC 4.34 4.10 - 5.70 M/uL    HGB 12.7 12.1 - 17.0 g/dL    HCT 38.6 36.6 - 50.3 %    MCV 88.9 80.0 - 99.0 FL    MCH 29.3 26.0 - 34.0 PG    MCHC 32.9 30.0 - 36.5 g/dL    RDW 14.3 11.5 - 14.5 %    PLATELET 554 736 - 876 K/uL    MPV 11.9 8.9 - 12.9 FL    NRBC 0.0 0  WBC    ABSOLUTE NRBC 0.00 0.00 - 0.01 K/uL    NEUTROPHILS 54 32 - 75 %    LYMPHOCYTES 27 12 - 49 %    MONOCYTES 11 5 - 13 %    EOSINOPHILS 7 0 - 7 %    BASOPHILS 1 0 - 1 %    IMMATURE GRANULOCYTES 0 0.0 - 0.5 %    ABS. NEUTROPHILS 2.4 1.8 - 8.0 K/UL    ABS. LYMPHOCYTES 1.2 0.8 - 3.5 K/UL    ABS. MONOCYTES 0.5 0.0 - 1.0 K/UL    ABS. EOSINOPHILS 0.3 0.0 - 0.4 K/UL    ABS. BASOPHILS 0.0 0.0 - 0.1 K/UL    ABS. IMM.  GRANS. 0.0 0.00 - 0.04 K/UL    DF AUTOMATED     TROPONIN I    Collection Time: 01/29/21  3:00 PM   Result Value Ref Range    Troponin-I, Qt. 0.24 (H) <0.05 ng/mL   TROPONIN I    Collection Time: 01/29/21  5:12 PM   Result Value Ref Range    Troponin-I, Qt. 0.21 (H) <0.05 ng/mL       Radiologic Studies -   XR SPINE CERV PA LAT ODONT 3 V MAX   Final Result   Mild torticollis. CT Results  (Last 48 hours)    None        CXR Results  (Last 48 hours)    None            Medical Decision Making   I am the first provider for this patient. I reviewed the vital signs, available nursing notes, past medical history, past surgical history, family history and social history. Vital Signs-Reviewed the patient's vital signs. Patient Vitals for the past 12 hrs:   Temp Pulse Resp BP SpO2   01/29/21 2009 -- 75 -- (!) 174/109 100 %   01/29/21 1922 97.6 °F (36.4 °C) 88 20 (!) 174/118 99 %   01/29/21 1921 -- 74 22 -- 100 %   01/29/21 1740 -- 75 21 (!) 167/97 98 %   01/29/21 1722 -- 73 -- -- --   01/29/21 1700 -- 71 16 (!) 189/117 100 %   01/29/21 1643 97.5 °F (36.4 °C) 76 19 (!) 201/104 99 %       Records Reviewed: Nursing Notes and Old Medical Records    Provider Notes (Medical Decision Making):   Patient presenting for neck pain in the setting of high blood pressures. Most likely because he has not been on his blood pressure medications for several months. Given his high blood pressure and neck pain, will get EKG and heart enzymes. 5:05 PM  Patient's troponin did come back elevated at 0.24. Plan will be to give him Lovenox, call cardiology and given aspirin nitro as well. ED Course:   Initial assessment performed. The patients presenting problems have been discussed, and they are in agreement with the care plan formulated and outlined with them. I have encouraged them to ask questions as they arise throughout their visit.     ED Course as of Jan 29 2310 Fri Jan 29, 2021   5071 Book with Filippo García, cardiology who states that they will likely just cycle troponins, get a echocardiogram but hold off on catheterization for now. [JS]   4500 Ekg interpreted by me. Ekg shows inferolateral t wave inversions. NSR otherwise. [JS]      ED Course User Index  [JS] Hattie Aiken MD     Critical Care Time:   CRITICAL CARE NOTE :    5:03 PM    IMPENDING DETERIORATION -Cardiovascular  ASSOCIATED RISK FACTORS - Hypotension, Shock and Dysrhythmia  MANAGEMENT- Bedside Assessment and Supervision of Care  INTERPRETATION -  Xrays, ECG, Blood Pressure and Cardiac Output Measures   INTERVENTIONS - hemodynamic mngmt  CASE REVIEW - Hospitalist, Medical Sub-Specialist and Nursing  TREATMENT RESPONSE -Stable  PERFORMED BY - Self    NOTES   :    I have spent 30 minutes of critical care time involved in lab review, consultations with specialist, family decision- making, bedside attention and documentation. During this entire length of time I was immediately available to the patient . Disposition:    Admission Note:  Patient is being admitted to the hospital by Dr. Duane Carroll, Service: Hospitalist.  The results of their tests and reasons for their admission have been discussed with them and available family. They convey agreement and understanding for the need to be admitted and for their admission diagnosis. Diagnosis     Clinical Impression:   1. NSTEMI (non-ST elevated myocardial infarction) ([facility] [address]    2. Essential hypertension        Attestations:  Eloy Rizzo M.D. Please note that this dictation was completed with [employer], the computer voice recognition software. Quite often unanticipated grammatical, syntax, homophones, and other interpretive errors are inadvertently transcribed by the computer software. Please disregard these errors. Please excuse any errors that have escaped final proofreading. Thank you.

## 2021-01-29 NOTE — H&P
Hospitalist Admission Note    NAME: Leona Moffett   :  1970   MRN:  [id number]     Date/Time:  2021 6:36 PM    Patient PCP: Alex Lee MD  _____________________________________________________________________  Given the patient's current clinical presentation, I have a high level of concern for decompensation if discharged from the emergency department. Complex decision making was performed, which includes reviewing the patient's available past medical records, laboratory results, and x-ray films. My assessment of this patient's clinical condition and my plan of care is as follows. Assessment / Plan:    Right neck and shoulder pain, sounds radicular   Uncontrolled hypertension  Elevated troponin  -CXR neg  -EKG NSR, NSSTW changes  -cardiac monitoring  -Echo in AM  -continue nitropaste  -start ASA  -restart his lisinopril. Adjust prn  -given Lovenox in ER  -cardiology consult. Agree presentation is atypical but patient at risk for ACS with uncontrolled HTN and elevated troponins. The character of his pain changes with movement of his neck. This sounds more radicular.  -will get cervical spine xray. Tramadol prn      Code Status:  Full  Surrogate Decision Maker:    DVT Prophylaxis: on lovenox  GI Prophylaxis: not indicated    Baseline:           Subjective:   CHIEF COMPLAINT:    Asked to return to ER because of elevated troponins. HISTORY OF PRESENT ILLNESS:     Leona Moffett is a 48 y.o.  male with a history of HTN and gout who was evaluated earlier today in the ER for 4 days for right neck and shoulder pain. The pain dose improve at a certain position of his neck. No radiation down his arms or legs. Denies weakness of right arm or . He works as a  and his work does entail a lot of neck movement.    He left the ER to attend to some personal matters but got called back when his labs demonstrated an elevated troponin. EKG with NSSTW changes. His /104. He was given ASA, NTP and lovenox. Cardiology consulted and we were asked to admit for work up and evaluation of the above problems. Past Medical History:   Diagnosis Date    Gout     H/O seasonal allergies     Hypertension     Other ill-defined conditions(799.89)     gout        Past Surgical History:   Procedure Laterality Date    HX HEENT      eyes    HX ORTHOPAEDIC      right knee petella tendon repair    HX RETINAL DETACHMENT REPAIR         Social History     Tobacco Use    Smoking status: Former Smoker     Types: Cigarettes    Smokeless tobacco: Never Used   Substance Use Topics    Alcohol use: Yes     Alcohol/week: 14.0 standard drinks     Types: 14 Cans of beer per week     Frequency: 2-3 times a week     Drinks per session: 5 or 6     Binge frequency: Weekly     Comment: \"6pack beer day\"        Family History   Problem Relation Age of Onset    Diabetes Neg Hx      No Known Allergies     Prior to Admission medications    Medication Sig Start Date End Date Taking? Authorizing Provider   indomethacin (INDOCIN) 25 mg capsule TAKE 1 CAPSULE BY MOUTH THREE TIMES DAILY AS NEEDED 12/2/20   Provider, Reggie   lisinopriL (PRINIVIL, ZESTRIL) 10 mg tablet Take 1 Tab by mouth daily. 1/28/21   Priscilla Galindo MD   naproxen (NAPROSYN) 500 mg tablet Take 1 Tab by mouth two (2) times daily (with meals). For neck pain 1/28/21   Priscilla Galindo MD   acetaminophen (TYLENOL) 500 mg tablet Take 2 Tabs by mouth every twelve (12) hours as needed for Pain. Take 2 tabs with one naproxen for severe pain 1/28/21   Priscilla Galindo MD   chlorthalidone (HYGROTON) 25 mg tablet Take 1 Tab by mouth daily. For blood pressure 1/28/21   Priscilla Galindo MD   cetirizine (ZYRTEC) 10 mg tablet Take 1 Tab by mouth daily.  3/28/18   [facility]       REVIEW OF SYSTEMS:       Total of 12 systems reviewed as follows:       POSITIVE= underlined text  Negative = text not underlined  General:  fever, chills, sweats, generalized weakness, weight loss/gain,      loss of appetite   Eyes:    blurred vision, eye pain, loss of vision, double vision  ENT:    rhinorrhea, pharyngitis   Respiratory:   cough, sputum production, SOB, PARISH, wheezing, pleuritic pain   Cardiology:   chest pain, palpitations, orthopnea, PND, edema, syncope, neck / shoulder pain  Gastrointestinal:  abdominal pain , N/V, diarrhea, dysphagia, constipation, bleeding   Genitourinary:  frequency, urgency, dysuria, hematuria, incontinence   Muskuloskeletal :  arthralgia, myalgia, back pain  Hematology:  easy bruising, nose or gum bleeding, lymphadenopathy   Dermatological: rash, ulceration, pruritis, color change / jaundice  Endocrine:   hot flashes or polydipsia   Neurological:  headache, dizziness, confusion, focal weakness, paresthesia,     Speech difficulties, memory loss, gait difficulty  Psychological: Feelings of anxiety, depression, agitation    Objective:   VITALS:    Visit Vitals  BP (!) 167/97   Pulse 75   Temp 97.5 °F (36.4 °C)   Resp 21   Ht 6' 3\" (1.905 m)   Wt 110.5 kg (243 lb 9.7 oz)   SpO2 98%   BMI 30.45 kg/m²       PHYSICAL EXAM:    General:    Alert, cooperative, no distress, appears stated age. HEENT: Atraumatic, anicteric sclerae, pink conjunctivae     No oral ulcers, mucosa moist, throat clear, dentition fair  Neck:  Supple, symmetrical,  thyroid: non tender  Lungs:   Clear to auscultation bilaterally. No Wheezing or Rhonchi. No rales. Chest wall:  No tenderness  No Accessory muscle use. Heart:   Regular  rhythm,  No  murmur   No edema  Abdomen:   Soft, non-tender. Not distended. Bowel sounds normal  Extremities: No cyanosis. No clubbing,  Skin turgor normal, Capillary refill normal, Radial dial pulse 2+  Skin:     Not pale. Not Jaundiced  No rashes   Psych:  Good insight. Not depressed. Not anxious or agitated. Neurologic: EOMs intact. No facial asymmetry.  No aphasia or slurred speech. Symmetrical strength, Sensation grossly intact. Alert and oriented X 4.     _______________________________________________________________________  Care Plan discussed with:    Comments   Patient x    Family      RN     Care Manager                    Consultant:      _______________________________________________________________________  Expected  Disposition:   Home with Family x   HH/PT/OT/RN    SNF/LTC    HEYDI    ________________________________________________________________________  TOTAL TIME: 48   Minutes    Critical Care Provided     Minutes non procedure based      Comments    x Reviewed previous records   >50% of visit spent in counseling and coordination of care  Discussion with patient and/or family and questions answered       Given the patient's current clinical presentation, I have a high level of concern for decompensation if discharged from the ED. Complex decision making was performed which includes reviewing the patient's available past medical records, laboratory results, and Xray films. I have also directly communicated my plan and discussed this case with the involved ED physician.     ____________________________________________________________________  Barkley Lanes, MD    Procedures: see electronic medical records for all procedures/Xrays and details which were not copied into this note but were reviewed prior to creation of Plan.     LAB DATA REVIEWED:    Recent Results (from the past 24 hour(s))   EKG, 12 LEAD, INITIAL    Collection Time: 01/29/21  2:48 PM   Result Value Ref Range    Ventricular Rate 68 BPM    Atrial Rate 68 BPM    P-R Interval 204 ms    QRS Duration 96 ms    Q-T Interval 390 ms    QTC Calculation (Bezet) 414 ms    Calculated P Axis 5 degrees    Calculated R Axis -10 degrees    Calculated T Axis -32 degrees    Diagnosis       Normal sinus rhythm  Nonspecific T wave abnormality  When compared with ECG of 06-OCT-2020 08:28,  No significant change was found  Confirmed by Merle Conway MD, Karla Lee ([id number]) on 1/29/2021 4:34:13 PM     URINALYSIS W/ REFLEX CULTURE    Collection Time: 01/29/21  2:54 PM    Specimen: Urine   Result Value Ref Range    Color YELLOW/STRAW      Appearance CLEAR CLEAR      Specific gravity 1.005 1.003 - 1.030      pH (UA) 6.0 5.0 - 8.0      Protein Negative NEG mg/dL    Glucose Negative NEG mg/dL    Ketone Negative NEG mg/dL    Bilirubin Negative NEG      Blood Negative NEG      Urobilinogen 0.2 0.2 - 1.0 EU/dL    Nitrites Negative NEG      Leukocyte Esterase Negative NEG      WBC 0-4 0 - 4 /hpf    RBC 0-5 0 - 5 /hpf    Epithelial cells FEW FEW /lpf    Bacteria Negative NEG /hpf    UA:UC IF INDICATED CULTURE NOT INDICATED BY UA RESULT CNI      Hyaline cast 0-2 0 - 5 /lpf   METABOLIC PANEL, COMPREHENSIVE    Collection Time: 01/29/21  3:00 PM   Result Value Ref Range    Sodium 138 136 - 145 mmol/L    Potassium 4.4 3.5 - 5.1 mmol/L    Chloride 107 97 - 108 mmol/L    CO2 27 21 - 32 mmol/L    Anion gap 4 (L) 5 - 15 mmol/L    Glucose 77 65 - 100 mg/dL    BUN 13 6 - 20 MG/DL    Creatinine 1.16 0.70 - 1.30 MG/DL    BUN/Creatinine ratio 11 (L) 12 - 20      GFR est AA >60 >60 ml/min/1.73m2    GFR est non-AA >60 >60 ml/min/1.73m2    Calcium 8.9 8.5 - 10.1 MG/DL    Bilirubin, total 0.7 0.2 - 1.0 MG/DL    ALT (SGPT) 34 12 - 78 U/L    AST (SGOT) 45 (H) 15 - 37 U/L    Alk.  phosphatase 61 45 - 117 U/L    Protein, total 8.0 6.4 - 8.2 g/dL    Albumin 4.2 3.5 - 5.0 g/dL    Globulin 3.8 2.0 - 4.0 g/dL    A-G Ratio 1.1 1.1 - 2.2     CBC WITH AUTOMATED DIFF    Collection Time: 01/29/21  3:00 PM   Result Value Ref Range    WBC 4.5 4.1 - 11.1 K/uL    RBC 4.34 4.10 - 5.70 M/uL    HGB 12.7 12.1 - 17.0 g/dL    HCT 38.6 36.6 - 50.3 %    MCV 88.9 80.0 - 99.0 FL    MCH 29.3 26.0 - 34.0 PG    MCHC 32.9 30.0 - 36.5 g/dL    RDW 14.3 11.5 - 14.5 %    PLATELET 475 413 - 527 K/uL    MPV 11.9 8.9 - 12.9 FL    NRBC 0.0 0  WBC    ABSOLUTE NRBC 0.00 0.00 - 0.01 K/uL    NEUTROPHILS 54 32 - 75 %    LYMPHOCYTES 27 12 - 49 %    MONOCYTES 11 5 - 13 %    EOSINOPHILS 7 0 - 7 %    BASOPHILS 1 0 - 1 %    IMMATURE GRANULOCYTES 0 0.0 - 0.5 %    ABS. NEUTROPHILS 2.4 1.8 - 8.0 K/UL    ABS. LYMPHOCYTES 1.2 0.8 - 3.5 K/UL    ABS. MONOCYTES 0.5 0.0 - 1.0 K/UL    ABS. EOSINOPHILS 0.3 0.0 - 0.4 K/UL    ABS. BASOPHILS 0.0 0.0 - 0.1 K/UL    ABS. IMM.  GRANS. 0.0 0.00 - 0.04 K/UL    DF AUTOMATED     TROPONIN I    Collection Time: 01/29/21  3:00 PM   Result Value Ref Range    Troponin-I, Qt. 0.24 (H) <0.05 ng/mL   TROPONIN I    Collection Time: 01/29/21  5:12 PM   Result Value Ref Range    Troponin-I, Qt. 0.21 (H) <0.05 ng/mL

## 2021-01-29 NOTE — CONSULTS
Pt seen and examined. Full consult dictated    Atypical Sx but elevated troponin in the setting of poorly controlled BP    Recc admit, NTP, ASA, one dose of Lovenox and BP control with serial ezymes.     Echo tomm    Will reassess in AM

## 2021-01-29 NOTE — ED NOTES
Patient not in waiting room. Patient called because he has critical Trop - he had to leave because he had to  a child form . He was told he had abnormal lab work pertaining to his heart. He said he will be back to the ED in 35-40 minutes.

## 2021-01-30 ENCOUNTER — APPOINTMENT (OUTPATIENT)
Dept: NON INVASIVE DIAGNOSTICS | Age: 51
End: 2021-01-30
Attending: INTERNAL MEDICINE

## 2021-01-30 VITALS
HEIGHT: 75 IN | OXYGEN SATURATION: 99 % | WEIGHT: 243.61 LBS | BODY MASS INDEX: 30.29 KG/M2 | HEART RATE: 66 BPM | DIASTOLIC BLOOD PRESSURE: 99 MMHG | TEMPERATURE: 97.7 F | RESPIRATION RATE: 17 BRPM | SYSTOLIC BLOOD PRESSURE: 158 MMHG

## 2021-01-30 LAB
ANION GAP SERPL CALC-SCNC: 6 MMOL/L (ref 5–15)
BUN SERPL-MCNC: 12 MG/DL (ref 6–20)
BUN/CREAT SERPL: 11 (ref 12–20)
CALCIUM SERPL-MCNC: 8.7 MG/DL (ref 8.5–10.1)
CHLORIDE SERPL-SCNC: 105 MMOL/L (ref 97–108)
CHOLEST SERPL-MCNC: 210 MG/DL
CO2 SERPL-SCNC: 26 MMOL/L (ref 21–32)
CREAT SERPL-MCNC: 1.07 MG/DL (ref 0.7–1.3)
ECHO AO ROOT DIAM: 2.82 CM
ECHO AV AREA PEAK VELOCITY: 2.46 CM2
ECHO AV AREA/BSA PEAK VELOCITY: 1 CM2/M2
ECHO AV PEAK GRADIENT: 6.03 MMHG
ECHO AV PEAK VELOCITY: 122.66 CM/S
ECHO EST RA PRESSURE: 10 MMHG
ECHO LA MAJOR AXIS: 3.03 CM
ECHO LA MINOR AXIS: 1.27 CM
ECHO LV E' LATERAL VELOCITY: 7.44 CM/S
ECHO LV E' SEPTAL VELOCITY: 6.53 CM/S
ECHO LV INTERNAL DIMENSION DIASTOLIC: 4.25 CM (ref 4.2–5.9)
ECHO LV INTERNAL DIMENSION SYSTOLIC: 2.69 CM
ECHO LV IVSD: 2.07 CM (ref 0.6–1)
ECHO LV MASS 2D: 331.6 G (ref 88–224)
ECHO LV MASS INDEX 2D: 138.9 G/M2 (ref 49–115)
ECHO LV POSTERIOR WALL DIASTOLIC: 1.48 CM (ref 0.6–1)
ECHO LVOT DIAM: 2.09 CM
ECHO LVOT PEAK GRADIENT: 3.07 MMHG
ECHO LVOT PEAK VELOCITY: 87.62 CM/S
ECHO MV A VELOCITY: 68.52 CM/S
ECHO MV E DECELERATION TIME (DT): 211.69 MS
ECHO MV E VELOCITY: 70.25 CM/S
ECHO MV E/A RATIO: 1.03
ECHO MV E/E' LATERAL: 9.44
ECHO MV E/E' RATIO (AVERAGED): 10.1
ECHO MV E/E' SEPTAL: 10.76
ECHO PV MAX VELOCITY: 109.59 CM/S
ECHO PV PEAK INSTANTANEOUS GRADIENT SYSTOLIC: 4.8 MMHG
ECHO RIGHT VENTRICULAR SYSTOLIC PRESSURE (RVSP): 34.62 MMHG
ECHO TV REGURGITANT MAX VELOCITY: 248.11 CM/S
ECHO TV REGURGITANT PEAK GRADIENT: 24.62 MMHG
EST. AVERAGE GLUCOSE BLD GHB EST-MCNC: 103 MG/DL
GLUCOSE SERPL-MCNC: 88 MG/DL (ref 65–100)
HBA1C MFR BLD: 5.2 % (ref 4–5.6)
HDLC SERPL-MCNC: 53 MG/DL
HDLC SERPL: 4 {RATIO} (ref 0–5)
LDLC SERPL CALC-MCNC: ABNORMAL MG/DL (ref 0–100)
LDLC SERPL DIRECT ASSAY-MCNC: 109 MG/DL (ref 0–100)
LIPID PROFILE,FLP: ABNORMAL
MAGNESIUM SERPL-MCNC: 2.2 MG/DL (ref 1.6–2.4)
POTASSIUM SERPL-SCNC: 3.1 MMOL/L (ref 3.5–5.1)
SODIUM SERPL-SCNC: 137 MMOL/L (ref 136–145)
TRIGL SERPL-MCNC: 526 MG/DL (ref ?–150)
TROPONIN I SERPL-MCNC: 0.24 NG/ML
VLDLC SERPL CALC-MCNC: ABNORMAL MG/DL

## 2021-01-30 PROCEDURE — 80048 BASIC METABOLIC PNL TOTAL CA: CPT

## 2021-01-30 PROCEDURE — 99218 HC RM OBSERVATION: CPT

## 2021-01-30 PROCEDURE — 36415 COLL VENOUS BLD VENIPUNCTURE: CPT

## 2021-01-30 PROCEDURE — 99233 SBSQ HOSP IP/OBS HIGH 50: CPT | Performed by: FAMILY MEDICINE

## 2021-01-30 PROCEDURE — 83721 ASSAY OF BLOOD LIPOPROTEIN: CPT

## 2021-01-30 PROCEDURE — 80061 LIPID PANEL: CPT

## 2021-01-30 PROCEDURE — 74011250637 HC RX REV CODE- 250/637: Performed by: INTERNAL MEDICINE

## 2021-01-30 PROCEDURE — 74011250637 HC RX REV CODE- 250/637: Performed by: FAMILY MEDICINE

## 2021-01-30 PROCEDURE — 93306 TTE W/DOPPLER COMPLETE: CPT

## 2021-01-30 PROCEDURE — 84484 ASSAY OF TROPONIN QUANT: CPT

## 2021-01-30 PROCEDURE — 83735 ASSAY OF MAGNESIUM: CPT

## 2021-01-30 PROCEDURE — 83036 HEMOGLOBIN GLYCOSYLATED A1C: CPT

## 2021-01-30 PROCEDURE — 74011250637 HC RX REV CODE- 250/637: Performed by: NURSE PRACTITIONER

## 2021-01-30 RX ORDER — LORAZEPAM 1 MG/1
1 TABLET ORAL 3 TIMES DAILY
Status: DISCONTINUED | OUTPATIENT
Start: 2021-01-30 | End: 2021-01-30 | Stop reason: HOSPADM

## 2021-01-30 RX ORDER — CARVEDILOL 6.25 MG/1
6.25 TABLET ORAL 2 TIMES DAILY WITH MEALS
Status: DISCONTINUED | OUTPATIENT
Start: 2021-01-30 | End: 2021-01-30 | Stop reason: HOSPADM

## 2021-01-30 RX ADMIN — OXYCODONE AND ACETAMINOPHEN 1 TABLET: 5; 325 TABLET ORAL at 13:17

## 2021-01-30 RX ADMIN — OXYCODONE AND ACETAMINOPHEN 1 TABLET: 5; 325 TABLET ORAL at 02:41

## 2021-01-30 RX ADMIN — LISINOPRIL 10 MG: 5 TABLET ORAL at 08:28

## 2021-01-30 RX ADMIN — NITROGLYCERIN 1 INCH: 20 OINTMENT TOPICAL at 18:29

## 2021-01-30 RX ADMIN — OXYCODONE AND ACETAMINOPHEN 1 TABLET: 5; 325 TABLET ORAL at 08:27

## 2021-01-30 RX ADMIN — NITROGLYCERIN 1 INCH: 20 OINTMENT TOPICAL at 11:00

## 2021-01-30 RX ADMIN — LORAZEPAM 1 MG: 1 TABLET ORAL at 08:28

## 2021-01-30 RX ADMIN — LORAZEPAM 1 MG: 1 TABLET ORAL at 16:53

## 2021-01-30 RX ADMIN — NITROGLYCERIN 1 INCH: 20 OINTMENT TOPICAL at 07:01

## 2021-01-30 RX ADMIN — OXYCODONE AND ACETAMINOPHEN 1 TABLET: 5; 325 TABLET ORAL at 18:05

## 2021-01-30 RX ADMIN — CARVEDILOL 6.25 MG: 6.25 TABLET, FILM COATED ORAL at 16:53

## 2021-01-30 RX ADMIN — ASPIRIN 81 MG: 81 TABLET, CHEWABLE ORAL at 08:27

## 2021-01-30 NOTE — PROGRESS NOTES
Received message from patient's nurse Mahsa Lorenzo stating :    patient states he is allergic to Tramadol, he was given tramadol this evening and he is nauseous and abdominal pain. Patient wants me to contact provider to change to Percocet. Patient states he tolerated Percocet, thank you       Discussion / orders:    · Discontinue tramadol  · Ordered Percocet 5-325 mg 1 tablet by mouth every 6 hours as needed for pain moderate to severe  · Patient currently does have Zofran IV ordered for nausea or vomiting           Please note that this note was dictated using Dragon computer voice recognition software. Quite often unanticipated grammatical, syntax, homophones, and other interpretive errors are inadvertently transcribed by the computer software. Please disregard these errors. Please excuse any errors that have escaped final proofreading.

## 2021-01-30 NOTE — PROGRESS NOTES
1920  Patient off to Chest Xray. 2009  Patient given dinner and snacks    [phone] Hospital or Facility: [facility] From: Mahsa Lorenzo RE: Linda Lexy 1970 RM: [id number] Good evening, please help, patient states he is allergic to Tramadol, he was given tramadol this evening and he is nauseous and abdominal pain. Patient wants me to contact provider to change to Percocet.  Patient states he tolerated Percocet, thank you Need Callback: NO CALLBACK Sam 6    update  0130  Patient reports his pain is better than before, patient in watching TV and laughing, patient reports good appetite

## 2021-01-30 NOTE — ED PROVIDER NOTES
12:18 AM    I was inadvertently assigned to this patient's treatment team.  I did not see this patient nor did I have any contact with this patient. I had no involvement during the evaluation, treatment or disposition of this patient. I am signing off this note to indicate only why my name appeared in the record. Please note patient was called regarding critical troponin he left the ED for a \"family emergency\" and returned.     Natalie Garza MD

## 2021-01-30 NOTE — CONSULTS
[facility]    Name:  Cecilio Palma  MR#:  [id number]  :  1970  ACCOUNT #:  [de-identified]  DATE OF SERVICE:  2021      REQUESTING PHYSICIAN:  Bret Brasher MD, [facility].    REASON FOR CONSULTATION:  Abnormal troponin. CHIEF COMPLAINT:  Right neck pain. HISTORY OF PRESENT ILLNESS:  The patient is a 51-year-old man with a history of hypertension and gout, but no prior cardiac history. He recently ran out of his blood pressure medication and was taking his mother's medication for several days. He saw Dr. Samuel Mayers, his PCP, yesterday and was put on lisinopril and chlorthalidone. It is not clear how high his blood pressure has been over the last several days. The patient recently developed some right neck pain which is not bothering him. He denies exertional chest discomfort or shortness of breath. He is physically active and works as a . The patient states the neck pain is worse when he bends his neck and better when he stands up. It is worse sitting in his truck. He came to the ER earlier today and his EKG showed only nonspecific changes. Troponin was 0.24 and repeat troponin was 0.21. I was asked to see the patient for evaluation. His blood pressure has been elevated. No prior cardiac issues. No history of diabetes. Lipid status is unknown. He does have a history of heavy alcohol use, but is a nonsmoker. PAST MEDICAL HISTORY:  Gout, seasonal allergies. SURGERIES:  Prior retinal detachment, prior patella tendon repair. CURRENT MEDICATIONS:  Indocin, lisinopril, chlorthalidone, Zyrtec. Previously the patient was on atenolol until he ran out of this medication a few days ago and this was subsequently stopped by his PCP. SOCIAL HISTORY:  The patient drinks 6-12 beers a day. He is a former smoker. He works as a . FAMILY HISTORY:  The patient's mother had heart disease and his father had a stroke.     REVIEW OF SYSTEMS:  As noted above, otherwise noncontributory. PHYSICAL EXAMINATION:  GENERAL:  Exam reveals a middle-aged -American male in no acute distress. VITAL SIGNS:  Blood pressure 167/97, pulse 75, respirations 21, temperature 97.5. HEENT:  Pupils are equal and reactive to light. NECK:  Supple. No masses or thyromegaly. No cervical or supraclavicular adenopathy. No carotid bruits. No JVD. CHEST:  Clear. SKIN:  Warm and dry. CARDIAC:  Regular rate and rhythm. Normal S1 and S2. No obvious murmurs, rubs, gallops or clicks. ABDOMEN:  Obese, soft, nontender, no masses or organomegaly. Bowel sounds positive. EXTREMITIES:  No cyanosis, clubbing or edema. Distal pulses 2+ in the feet bilaterally. NEUROLOGIC:  No obvious gross motor deficits. LABORATORIES:  Hemoglobin 17.7. BUN 13, creatinine 1.2. Troponin 0.24 and 0.21. EKG:  Normal sinus rhythm, normal axis, nonspecific ST-T changes with slight inferior changes. IMPRESSION:  1. Abnormal troponin with atypical symptoms. 2.  Hypertension and presumed hypertensive heart disease with poor blood pressure control. 3.  Alcohol abuse. 4.  History of gout. RECOMMENDATIONS:  Recommend admitting the patient overnight with serial enzymes. I would start him on an aspirin and nitroglycerin paste and adjust medications to optimize blood pressure control. We could also start a low dose of a beta-blocker. The patient has received a dose of Lovenox in the ER. I would not take this patient urgently to the cardiac catheterization lab. I would repeat enzymes and plan for an echocardiogram tomorrow. The enzyme elevation could be due to hypertensive urgency or other noncardiac causes as his symptoms are certainly atypical.    Thank you for this consult. We will follow up tomorrow.         Kathleen Kurtz MD      [id number]  D:  01/29/2021 19:01  T:  01/29/2021 20:22  JOB #:  [id number]  CC:  Melbourne Martins, MD Barkley Lanes, MD Kandi Scott MD

## 2021-01-30 NOTE — ED NOTES
TRANSFER - OUT REPORT:    Verbal report given to Maryann RN(name) on Nehemias Rizo  being transferred to [facility] [facility](unit) for routine progression of care       Report consisted of patients Situation, Background, Assessment and   Recommendations(SBAR). Information from the following report(s) SBAR, Kardex, ED Summary, [facility] and Recent Results was reviewed with the receiving nurse. Lines:   Peripheral IV 01/29/21 Left Forearm (Active)   Site Assessment Clean, dry, & intact 01/29/21 1714   Phlebitis Assessment 0 01/29/21 1714   Infiltration Assessment 0 01/29/21 1714   Dressing Status Clean, dry, & intact 01/29/21 1714   Dressing Type Transparent 01/29/21 1714   Hub Color/Line Status Pink;Flushed 01/29/21 1714   Alcohol Cap Used Yes 01/29/21 1714        Opportunity for questions and clarification was provided.       Patient transported with:   Registered Nurse

## 2021-01-30 NOTE — PROGRESS NOTES
Cardiology Progress Note      1/30/2021 10:34 AM    Admit Date: 1/29/2021          Subjective: Rt sided neck pain better. Troponins flat but elevated. awaiting echo. BP still up- coreg started          Visit Vitals  BP (!) 163/121   Pulse 62   Temp 98.1 °F (36.7 °C)   Resp 17   Ht 6' 3\" (1.905 m)   Wt 110.5 kg (243 lb 9.7 oz)   SpO2 99%   BMI 30.45 kg/m²     01/28 1901 - 01/30 0700  In: 1500 [P.O.:1500]  Out: 1300 [Urine:1300]        Objective:      Physical Exam:  VS as above  Chest CTA  Card RRR 2/6 LARA , no agllop     Data Review:   Labs:        Telemetry: SR       Assessment:     1. Abnormal troponin with atypical symptoms. 2.  Hypertension and presumed hypertensive heart disease with poor blood pressure control. 3.  Alcohol abuse. 4.  History of gout. 5. Dyslipidemia with triglycerides 526    6. Rt sided neck pain     Plan: Await echo. If abnormal will likely need cath.  Adjust BP meds

## 2021-01-30 NOTE — PROGRESS NOTES
General Daily Progress Note    Admit Date: 1/29/2021  Hospital day 2    Subjective:     Patient has no complaint of chest pain or dyspnea. Continues to have R sided neck pain, worse with certain movements. 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 Not related to activity. Nonsmoker, no family hx stents or CABG. drinks at least 6 beers/day. Percocet is easing pain. ..   Medication side effects: none    Current Facility-Administered Medications   Medication Dose Route Frequency    LORazepam (ATIVAN) tablet 1 mg  1 mg Oral TID    sodium chloride (NS) flush 5-40 mL  5-40 mL IntraVENous Q8H    sodium chloride (NS) flush 5-40 mL  5-40 mL IntraVENous PRN    acetaminophen (TYLENOL) tablet 650 mg  650 mg Oral Q6H PRN    Or    acetaminophen (TYLENOL) suppository 650 mg  650 mg Rectal Q6H PRN    polyethylene glycol (MIRALAX) packet 17 g  17 g Oral DAILY PRN    promethazine (PHENERGAN) tablet 12.5 mg  12.5 mg Oral Q6H PRN    Or    ondansetron (ZOFRAN) injection 4 mg  4 mg IntraVENous Q6H PRN    aspirin chewable tablet 81 mg  81 mg Oral DAILY    nitroglycerin (NITROBID) 2 % ointment 1 Inch  1 Inch Topical Q6H    lisinopriL (PRINIVIL, ZESTRIL) tablet 10 mg  10 mg Oral DAILY    labetaloL (NORMODYNE;TRANDATE) injection 10 mg  10 mg IntraVENous Q6H PRN    oxyCODONE-acetaminophen (PERCOCET) 5-325 mg per tablet 1 Tab  1 Tab Oral Q6H PRN        Review of Systems  Pertinent items are noted in HPI. Objective:     Patient Vitals for the past 8 hrs:   BP Temp Pulse Resp SpO2   01/30/21 0255 (!) 148/101 97.6 °F (36.4 °C) 60 16 99 %     No intake/output data recorded.   01/28 1901 - 01/30 0700  In: 1500 [P.O.:1500]  Out: 1300 [Urine:1300]    Physical Exam:   Visit Vitals  BP (!) 148/101 (BP Patient Position: Supine)   Pulse 60   Temp 97.6 °F (36.4 °C)   Resp 16   Ht 6' 3\" (1.905 m)   Wt 243 lb 9.7 oz (110.5 kg)   SpO2 99%   BMI 30.45 kg/m²     Lungs: clear to auscultation bilaterally  Heart: regular rate and rhythm, S1, S2 normal, no murmur, click, rub or gallop  Abdomen: soft, non-tender. Bowel sounds normal. No masses,  no organomegaly  Extremities: extremities normal, atraumatic, no cyanosis or edema  Neck- decreased ROM, wore if turns head to right.        ECG: normal sinus rhythm     Data Review   Recent Results (from the past 24 hour(s))   EKG, 12 LEAD, INITIAL    Collection Time: 01/29/21  2:48 PM   Result Value Ref Range    Ventricular Rate 68 BPM    Atrial Rate 68 BPM    P-R Interval 204 ms    QRS Duration 96 ms    Q-T Interval 390 ms    QTC Calculation (Bezet) 414 ms    Calculated P Axis 5 degrees    Calculated R Axis -10 degrees    Calculated T Axis -32 degrees    Diagnosis       Normal sinus rhythm  Nonspecific T wave abnormality  When compared with ECG of 06-OCT-2020 08:28,  No significant change was found  Confirmed by Cj Giles MD, [facility] ([id number]) on 1/29/2021 4:34:13 PM     URINALYSIS W/ REFLEX CULTURE    Collection Time: 01/29/21  2:54 PM    Specimen: Urine   Result Value Ref Range    Color YELLOW/STRAW      Appearance CLEAR CLEAR      Specific gravity 1.005 1.003 - 1.030      pH (UA) 6.0 5.0 - 8.0      Protein Negative NEG mg/dL    Glucose Negative NEG mg/dL    Ketone Negative NEG mg/dL    Bilirubin Negative NEG      Blood Negative NEG      Urobilinogen 0.2 0.2 - 1.0 EU/dL    Nitrites Negative NEG      Leukocyte Esterase Negative NEG      WBC 0-4 0 - 4 /hpf    RBC 0-5 0 - 5 /hpf    Epithelial cells FEW FEW /lpf    Bacteria Negative NEG /hpf    UA:UC IF INDICATED CULTURE NOT INDICATED BY UA RESULT CNI      Hyaline cast 0-2 0 - 5 /lpf   METABOLIC PANEL, COMPREHENSIVE    Collection Time: 01/29/21  3:00 PM   Result Value Ref Range    Sodium 138 136 - 145 mmol/L    Potassium 4.4 3.5 - 5.1 mmol/L    Chloride 107 97 - 108 mmol/L    CO2 27 21 - 32 mmol/L    Anion gap 4 (L) 5 - 15 mmol/L    Glucose 77 65 - 100 mg/dL    BUN 13 6 - 20 MG/DL    Creatinine 1.16 0.70 - 1.30 MG/DL    BUN/Creatinine ratio 11 (L) 12 - 20      GFR est AA >60 >60 ml/min/1.73m2    GFR est non-AA >60 >60 ml/min/1.73m2    Calcium 8.9 8.5 - 10.1 MG/DL    Bilirubin, total 0.7 0.2 - 1.0 MG/DL    ALT (SGPT) 34 12 - 78 U/L    AST (SGOT) 45 (H) 15 - 37 U/L    Alk. phosphatase 61 45 - 117 U/L    Protein, total 8.0 6.4 - 8.2 g/dL    Albumin 4.2 3.5 - 5.0 g/dL    Globulin 3.8 2.0 - 4.0 g/dL    A-G Ratio 1.1 1.1 - 2.2     CBC WITH AUTOMATED DIFF    Collection Time: 01/29/21  3:00 PM   Result Value Ref Range    WBC 4.5 4.1 - 11.1 K/uL    RBC 4.34 4.10 - 5.70 M/uL    HGB 12.7 12.1 - 17.0 g/dL    HCT 38.6 36.6 - 50.3 %    MCV 88.9 80.0 - 99.0 FL    MCH 29.3 26.0 - 34.0 PG    MCHC 32.9 30.0 - 36.5 g/dL    RDW 14.3 11.5 - 14.5 %    PLATELET 556 263 - 326 K/uL    MPV 11.9 8.9 - 12.9 FL    NRBC 0.0 0  WBC    ABSOLUTE NRBC 0.00 0.00 - 0.01 K/uL    NEUTROPHILS 54 32 - 75 %    LYMPHOCYTES 27 12 - 49 %    MONOCYTES 11 5 - 13 %    EOSINOPHILS 7 0 - 7 %    BASOPHILS 1 0 - 1 %    IMMATURE GRANULOCYTES 0 0.0 - 0.5 %    ABS. NEUTROPHILS 2.4 1.8 - 8.0 K/UL    ABS. LYMPHOCYTES 1.2 0.8 - 3.5 K/UL    ABS. MONOCYTES 0.5 0.0 - 1.0 K/UL    ABS. EOSINOPHILS 0.3 0.0 - 0.4 K/UL    ABS. BASOPHILS 0.0 0.0 - 0.1 K/UL    ABS. IMM.  GRANS. 0.0 0.00 - 0.04 K/UL    DF AUTOMATED     TROPONIN I    Collection Time: 01/29/21  3:00 PM   Result Value Ref Range    Troponin-I, Qt. 0.24 (H) <0.05 ng/mL   TROPONIN I    Collection Time: 01/29/21  5:12 PM   Result Value Ref Range    Troponin-I, Qt. 0.21 (H) <3.81 ng/mL   METABOLIC PANEL, BASIC    Collection Time: 01/30/21  2:45 AM   Result Value Ref Range    Sodium 137 136 - 145 mmol/L    Potassium 3.1 (L) 3.5 - 5.1 mmol/L    Chloride 105 97 - 108 mmol/L    CO2 26 21 - 32 mmol/L    Anion gap 6 5 - 15 mmol/L    Glucose 88 65 - 100 mg/dL    BUN 12 6 - 20 MG/DL    Creatinine 1.07 0.70 - 1.30 MG/DL    BUN/Creatinine ratio 11 (L) 12 - 20      GFR est AA >60 >60 ml/min/1.73m2    GFR est non-AA >60 >60 ml/min/1.73m2    Calcium 8.7 8.5 - 10.1 MG/DL   MAGNESIUM    Collection Time: 01/30/21  2:45 AM   Result Value Ref Range    Magnesium 2.2 1.6 - 2.4 mg/dL   TROPONIN I    Collection Time: 01/30/21  2:45 AM   Result Value Ref Range    Troponin-I, Qt. 0.24 (H) <0.05 ng/mL           Assessment:     Active Problems:    Non-ST elevation (NSTEMI) myocardial infarction ([facility] [address] (1/29/2021)        Plan:       1. Elevated troponin level- for echo today. Appreciate cardiologys help. 2. Accelerated hypertension. Now on lisinopril and nitropaste. Chlorthalidone started a few days ago, currently on hold  3. R sided neck pain- musculoskeletal. Percocet working fairly well for pain control. 4. Heavy alcohol use- at least 6 beers a day- probably contributing to hypertension.  Will start scheulded ativan while in hospital to prevent DTs

## 2021-01-31 NOTE — PROGRESS NOTES
Pt refused to stay in hospital. Dr Lion Sumner saw pt prior to [facility] form signed. AMA and still decided to leave. Tele removed and IV d/jimmy. Pt left and refused to be escorted out by nurse.

## 2021-02-01 ENCOUNTER — TELEPHONE (OUTPATIENT)
Dept: FAMILY MEDICINE CLINIC | Age: 51
End: 2021-02-01

## 2021-02-01 DIAGNOSIS — R77.8 ELEVATED TROPONIN LEVEL: Primary | ICD-10-CM

## 2021-02-01 NOTE — TELEPHONE ENCOUNTER
Patient would like a call from [address] regarding going to the ER on Friday they wanted to do a stress test on him but he left Saturday he wants to know how will he go about getting Stress test done he can be reached @ [phone]

## 2021-02-01 NOTE — DISCHARGE SUMMARY
[address] SUMMARY    Name:  Sundar Mcconnell  MR#:  [id number]  :  1970  ACCOUNT #:  [de-identified]  ADMIT DATE:  2021  DISCHARGE DATE:  2021    FINAL DIAGNOSES:  1. Elevated troponin level. 2.  Accelerated hypertension. 3.  Right-sided neck pain. 4.  History of heavy alcohol use. CONSULTATION:  Dr. Vishnu Niño, Cardiology. HISTORY OF PRESENT ILLNESS:  The patient is a very pleasant 51-year-old -American male with a history of hypertension and gout, who had been seen in the emergency room earlier on the day of admission with four-day history of severe right-sided neck and shoulder pain. Pain seemed to be get better in certain positions. 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 No radiation into his arms. No weakness of the right arm or . When seen in the emergency room, his blood pressure has been noted to be in the high 201/104. He had, had leave to attend some personal business. He had an EKG done showing some non-ST T wave changes. His troponin level was elevated. He came back after being told about his troponin level. No history of diabetes. PAST MEDICAL HISTORY:  Significant for gout. ALLERGIES:  Hypertension. PAST SURGERIES:  Include eye surgery, right knee patellar tendon repair, history of retinal detachment repair. HABITS:  Smoking, former smoker. Alcohol use is roughly six beers a day. MEDICATIONS ON ADMISSION:  Indocin 25 mg q.8 hours p.r.n. gout, lisinopril 10 mg daily, Naprosyn 500 mg twice daily for his current neck pain, Tylenol p.r.n. pain, chlorthalidone 25 mg daily which he is recently started on for hypertension, Zyrtec 10 mg daily. REVIEW OF SYSTEMS:  Please see HPI. Of note, there was no complaints of chest pain, palpitations, orthopnea, PND or edema, just the right-sided neck and shoulder pain.     PHYSICAL EXAMINATION ON ADMISSION:  GENERAL:  Alert and cooperative. VITAL SIGNS:  Blood pressure 167/97, pulse 75, temperature 97.5, respiratory rate 21, height 6 feet 3, weight 243, BMI 30.45. NECK:  Supple, symmetrical, thyroid nontender. LUNGS:  Clear to auscultation and percussion. CARDIOVASCULAR:  Regular rhythm and rate. No murmurs, thrills, rubs or gallop. No edema. ABDOMEN:  Soft, nontender, nondistended. EXTREMITIES:  Without edema. LABORATORY DATA:  White count 4500, hemoglobin 12.7, platelets 274,873. Electrolytes were normal.  Blood sugar was 77. AST was 45. Remainder of the metabolic panel was normal.  Troponin level was 0.24 on admission. Repeat levels were 0.21 and 0.24. Triglyceride level 526. Total cholesterol 210, HDL 53. HOSPITAL COURSE:  Seen in consultation by Dr. Soo Delgado. His pain seemed to be very atypical.  The pain seemed to be much more musculoskeletal, worse with movement of neck, present more at rest.  He had an echocardiogram done while in the hospital, ejection fraction of 18%-52%, normal systolic function, mild concentric hypertrophy, normal wall motion, grade I left ventricular diastolic dysfunction, mild tricuspid valve regurgitation. Wall motion of the left ventricle is normal.  No aortic valve regurgitation. No mitral valve regurgitation. The patient is recommended to stay in the hospital and take a stress test on Monday; however, he did not want to do this and left AMA on Saturday night on 01/30/2021.         Figueroa Harrell MD      [id number]  D:  01/31/2021 10:23  T:  01/31/2021 22:28  JOB #:  [id number]

## 2021-02-01 NOTE — TELEPHONE ENCOUNTER
Called Dr. Jean Ackerman office to set up an appointment. Dr. Jean Ackerman office is setting up stress test and appointment. Will notify patient of upcoming appointment.

## 2021-02-02 ENCOUNTER — TELEPHONE (OUTPATIENT)
Dept: FAMILY MEDICINE CLINIC | Age: 51
End: 2021-02-02

## 2021-02-02 DIAGNOSIS — M54.2 NECK PAIN: ICD-10-CM

## 2021-02-02 DIAGNOSIS — G47.33 OSA (OBSTRUCTIVE SLEEP APNEA): Primary | ICD-10-CM

## 2021-02-02 RX ORDER — OXYCODONE AND ACETAMINOPHEN 5; 325 MG/1; MG/1
1 TABLET ORAL
Qty: 50 TAB | Refills: 0 | Status: SHIPPED | OUTPATIENT
Start: 2021-02-02 | End: 2021-02-16 | Stop reason: SDUPTHER

## 2021-02-02 RX ORDER — ATORVASTATIN CALCIUM 20 MG/1
20 TABLET, FILM COATED ORAL DAILY
Qty: 90 TAB | Refills: 3 | Status: SHIPPED | OUTPATIENT
Start: 2021-02-02

## 2021-02-02 NOTE — PROGRESS NOTES
pc with pt. Will start atorvastatin for cholesterol. Will refill percocert for nck pain. For stress test 2-11.  Will also refer sleep medicine to marilynn CHENEY

## 2021-02-02 NOTE — TELEPHONE ENCOUNTER
Patient requesting pain medication, tried to set patient up with an appointment for pain contract and urine drug screen, offered this Thursday at 11;40 am but patient stated he would  Just go to ER and get pain medication.

## 2021-02-02 NOTE — TELEPHONE ENCOUNTER
----- Message from Pipe Klein sent at 2/2/2021  1:43 PM EST -----  Regarding: Dr. Knutson Corporal: [phone]  Medication Refill    Caller (if not patient): N/A      Relationship of caller (if not patient): N/A      Best contact number(s):[phone]      Name of medication and dosage if known: Percocet (patient is not sure of the dosage)      Is patient out of this medication (yes/no): Yes      Pharmacy name: [facility] listed in chart? (yes/no): Yes  Pharmacy phone number: N/A      Details to clarify the request: Patient was in the hospital for a pinch nerve. Dr. Michael Issa prescribed patient with some pain medicine which didn't seem to work. Patient also saw Dr. Sanjuanita Pavon who prescribe him percocet. Dr. Jasmine Cuadra nurse told patient that he needed to get Dr. Michael Issa to prescribe him percocet since Dr. Sanjuanita Pavon can not.         Pipe Klein

## 2021-02-03 ENCOUNTER — TELEPHONE (OUTPATIENT)
Dept: FAMILY MEDICINE CLINIC | Age: 51
End: 2021-02-03

## 2021-02-03 NOTE — TELEPHONE ENCOUNTER
Lyric Ramos, from [address] [place] [address] Road called about a medication. They cannot fill the entire 50 of the medication.(percocet) They can only fill a weeks supply. Please call @[phone].

## 2021-02-03 NOTE — TELEPHONE ENCOUNTER
Spoke with pharmacy and they just wanted to let us know, due to [facility] the first fill for an opioid can only be a 7 day supply.

## 2021-02-09 ENCOUNTER — TELEPHONE (OUTPATIENT)
Dept: FAMILY MEDICINE CLINIC | Age: 51
End: 2021-02-09

## 2021-02-09 DIAGNOSIS — M54.2 NECK PAIN: Primary | ICD-10-CM

## 2021-02-09 NOTE — TELEPHONE ENCOUNTER
----- Message from Anita Medeiros sent at 2/9/2021  2:15 PM EST -----  Regarding: Dr. Nasim Momin / Telephone  Patient return call    Caller's first and last name and relationship (if not the patient): N/A      Best contact number(s): [phone]       Whose call is being returned: Nurse       Details to clarify the request: Pt informed that he has an appointment for Sleep Anepia on 2/11/2021 and Colonoscopy 3/16/2021. Pt would like to speak with a nurse about these two upcoming appointment as well as setting up an appointment for his pinched nerve in his neck.       Anita Medeiros

## 2021-02-10 ENCOUNTER — TELEPHONE (OUTPATIENT)
Dept: FAMILY MEDICINE CLINIC | Age: 51
End: 2021-02-10

## 2021-02-10 NOTE — TELEPHONE ENCOUNTER
Patient is calling because he needs a refill on his:oxyCODONE-acetaminophen (PERCOCET) 5-325 mg per tablet. Patient said that pharmacy would only fill 21 at a time. Please call @[phone].

## 2021-02-10 NOTE — TELEPHONE ENCOUNTER
----- Message from Nydia Valenzuela sent at 2/10/2021  2:48 PM EST -----  Regarding: Dr. Teresa Bee first and last name: pt      Reason for call: refill request      Callback required yes/no and why: yes, to confirm      Best contact number(s): [phone]      Details to clarify the request: pt calling back in regard to refill request for the Oxycodone and he is upset because he stated he spoke with a nurse yesterday who told him his request would be expedited but it was never called in.       Nydia Valenzuela

## 2021-02-15 ENCOUNTER — TELEPHONE (OUTPATIENT)
Dept: FAMILY MEDICINE CLINIC | Age: 51
End: 2021-02-15

## 2021-02-15 DIAGNOSIS — M54.2 NECK PAIN: ICD-10-CM

## 2021-02-15 RX ORDER — OXYCODONE AND ACETAMINOPHEN 5; 325 MG/1; MG/1
1 TABLET ORAL
Qty: 21 TAB | Refills: 0 | Status: CANCELLED | OUTPATIENT
Start: 2021-02-15 | End: 2021-03-17

## 2021-02-15 NOTE — TELEPHONE ENCOUNTER
Referral put in verbal order given by Dr. Lupis Trejo for Dr. Iam Carrizales. Referral given to referral .   Message left on patient voice mail notifying patient of referral.

## 2021-02-15 NOTE — TELEPHONE ENCOUNTER
Pain medicine PROBLEM still     8 or 9 times his request has not been taken care of    plz call asap

## 2021-02-15 NOTE — TELEPHONE ENCOUNTER
Message left on patient voice mail requesting a return call regarding message. Need clarification on specific medication.

## 2021-02-16 ENCOUNTER — TELEPHONE (OUTPATIENT)
Dept: FAMILY MEDICINE CLINIC | Age: 51
End: 2021-02-16

## 2021-02-16 DIAGNOSIS — M54.2 NECK PAIN: ICD-10-CM

## 2021-02-16 RX ORDER — OXYCODONE AND ACETAMINOPHEN 5; 325 MG/1; MG/1
1 TABLET ORAL
Qty: 21 TAB | Refills: 0 | Status: SHIPPED | OUTPATIENT
Start: 2021-02-16 | End: 2021-03-18

## 2021-02-16 NOTE — TELEPHONE ENCOUNTER
----- Message from Kesha Grier sent at 2/16/2021 10:42 AM EST -----  Regarding: Dr. April Kirkpatrick Message/Vendor Calls    Caller's first and last name: pt      Reason for call: needs to speak to Dr. Rich Espinoza required yes/no and why: yes,      Best contact number(s): [phone]        Details to clarify the request: pt advised she trying to get in touch all of last week and wants to speak to Dr. Niel Vera nurse.       Kesha Grier

## 2021-02-16 NOTE — TELEPHONE ENCOUNTER
Patient mad because med refill request yesterday for pain medication was not signed off on by pcp. Explained to patient that I had no control over this and due to the hour sent yesterday might not be completed till today as meds are 72 hour turn around. Patient was notified of all prescription medication yesterday. Patient hung up on caller after nurse declined to pull pcp out of exam room to sign off right now for pain medication. Pharmacy will only fill 21 days at a time and patient was notified this yesterday and refill was sent to pcp.

## 2021-05-28 ENCOUNTER — TELEPHONE (OUTPATIENT)
Dept: FAMILY MEDICINE CLINIC | Age: 51
End: 2021-05-28

## 2021-05-28 NOTE — TELEPHONE ENCOUNTER
Verified patient with two type of identifiers. Pt states needs a letter stating the following; That pt's condition (hypertension) is stable on his current medication and no change is dosages are needed & that he is able to operate a vehicle on his medications. Pt states BP has been great with last reading of 140/75. Informed pt that message will be given to Dr. Hank Esqueda for review. Pt verbalized understanding.

## 2021-05-28 NOTE — TELEPHONE ENCOUNTER
Patient was told by [employer] physician that he needs a letter from doctor. This is in regards to his b/p. Please call [phone].

## 2021-06-01 ENCOUNTER — TELEPHONE (OUTPATIENT)
Dept: FAMILY MEDICINE CLINIC | Age: 51
End: 2021-06-01

## 2021-06-02 ENCOUNTER — TELEPHONE (OUTPATIENT)
Dept: FAMILY MEDICINE CLINIC | Age: 51
End: 2021-06-02

## 2021-06-02 NOTE — TELEPHONE ENCOUNTER
----- Message from Cam Hamm sent at 6/2/2021 11:13 AM EDT -----  Regarding: Maurisio Amezquita MD/telephone  General Message/Vendor Calls    Caller's first and last name: Pt      Reason for call: Letter for [employer]      Callback required yes/no and why: yes confirm      Best contact number(s): [phone]      Details to clarify the request: still waiting for the [employer] letter , pt has been calling since last Thursday to get this resolved. Pt needs to hear back from Nurse as soon as possible , he was told last Friday that this would be ready for the doctor signature on Tuesday 6/1 and would like to come and  the letter.        Cam Hamm

## 2021-06-02 NOTE — TELEPHONE ENCOUNTER
----- Message from Ashley sent at 6/2/2021 12:31 PM EDT -----  Regarding: Dr. Urvashi Eastman  Patient return call    Caller's first and last name and relationship (if not the patient):  N/A      Best contact number(s):  [phone]      Whose call is being returned:  May Horvath      Details to clarify the request:  Patient is returning Elena's call regarding setting up an appointment. He cannot do 11:40a.m. tomorrow, but he needs to be seen sooner than 6/30 that we have available.       Ashley

## 2021-06-02 NOTE — TELEPHONE ENCOUNTER
Health Maintenance Due   Topic Date Due   • Pneumococcal Vaccine 0-64 (1 of 3 - PCV13) 05/02/2002   • DTaP/Tdap/Td Vaccine (5 - Tdap) 05/02/2015   • Cervical Cancer Screening  05/02/2017   • Influenza Vaccine (1) 09/01/2019       Patient is due for topics as listed above but is not proceeding with Immunization(s) Dtap/Tdap/Td, Influenza and Pneumococcal and Cervical cancer screening at this time. Education provided for Immunization(s) Dtap/Tdap/Td, Influenza and Pneumococcal and Cervical cancer screening.           Made appointment for tomorrow at 11;40 am

## 2021-06-02 NOTE — TELEPHONE ENCOUNTER
Left another message with patient.  Patient needs an appointment before writing letter that BP is under control, the last time patient was seen BP was 171/114, appointment available tomorrow at 11:40 am

## 2021-07-29 RX ORDER — INDOMETHACIN 25 MG/1
CAPSULE ORAL
Qty: 90 CAPSULE | Refills: 1 | OUTPATIENT
Start: 2021-07-29 | End: 2022-08-13

## 2021-09-23 ENCOUNTER — HOSPITAL ENCOUNTER (EMERGENCY)
Age: 51
Discharge: LEFT BEFORE DISCHARGE | End: 2021-09-23
Attending: EMERGENCY MEDICINE

## 2021-09-23 ENCOUNTER — APPOINTMENT (OUTPATIENT)
Dept: GENERAL RADIOLOGY | Age: 51
End: 2021-09-23
Attending: PHYSICIAN ASSISTANT

## 2021-09-23 VITALS
BODY MASS INDEX: 30.04 KG/M2 | DIASTOLIC BLOOD PRESSURE: 134 MMHG | OXYGEN SATURATION: 100 % | SYSTOLIC BLOOD PRESSURE: 155 MMHG | RESPIRATION RATE: 16 BRPM | HEART RATE: 80 BPM | HEIGHT: 75 IN | WEIGHT: 241.62 LBS | TEMPERATURE: 98 F

## 2021-09-23 DIAGNOSIS — R05.9 COUGH: Primary | ICD-10-CM

## 2021-09-23 DIAGNOSIS — R07.9 CHEST PAIN, UNSPECIFIED TYPE: ICD-10-CM

## 2021-09-23 LAB
ALBUMIN SERPL-MCNC: 3.5 G/DL (ref 3.5–5)
ALBUMIN/GLOB SERPL: 0.9 {RATIO} (ref 1.1–2.2)
ALP SERPL-CCNC: 64 U/L (ref 45–117)
ALT SERPL-CCNC: 28 U/L (ref 12–78)
ANION GAP SERPL CALC-SCNC: 5 MMOL/L (ref 5–15)
APPEARANCE UR: CLEAR
AST SERPL-CCNC: 27 U/L (ref 15–37)
BACTERIA URNS QL MICRO: NEGATIVE /HPF
BASOPHILS # BLD: 0.1 K/UL (ref 0–0.1)
BASOPHILS NFR BLD: 1 % (ref 0–1)
BILIRUB SERPL-MCNC: 0.6 MG/DL (ref 0.2–1)
BILIRUB UR QL: NEGATIVE
BUN SERPL-MCNC: 11 MG/DL (ref 6–20)
BUN/CREAT SERPL: 10 (ref 12–20)
CALCIUM SERPL-MCNC: 8.1 MG/DL (ref 8.5–10.1)
CHLORIDE SERPL-SCNC: 109 MMOL/L (ref 97–108)
CK SERPL-CCNC: 143 U/L (ref 39–308)
CO2 SERPL-SCNC: 26 MMOL/L (ref 21–32)
COLOR UR: NORMAL
CREAT SERPL-MCNC: 1.15 MG/DL (ref 0.7–1.3)
D DIMER PPP FEU-MCNC: 0.28 MG/L FEU (ref 0–0.65)
DIFFERENTIAL METHOD BLD: ABNORMAL
EOSINOPHIL # BLD: 0.4 K/UL (ref 0–0.4)
EOSINOPHIL NFR BLD: 9 % (ref 0–7)
EPITH CASTS URNS QL MICRO: NORMAL /LPF
ERYTHROCYTE [DISTWIDTH] IN BLOOD BY AUTOMATED COUNT: 14.3 % (ref 11.5–14.5)
GLOBULIN SER CALC-MCNC: 3.8 G/DL (ref 2–4)
GLUCOSE SERPL-MCNC: 89 MG/DL (ref 65–100)
GLUCOSE UR STRIP.AUTO-MCNC: NEGATIVE MG/DL
HCT VFR BLD AUTO: 40.5 % (ref 36.6–50.3)
HGB BLD-MCNC: 13.1 G/DL (ref 12.1–17)
HGB UR QL STRIP: NEGATIVE
HYALINE CASTS URNS QL MICRO: NORMAL /LPF (ref 0–5)
IMM GRANULOCYTES # BLD AUTO: 0 K/UL (ref 0–0.04)
IMM GRANULOCYTES NFR BLD AUTO: 0 % (ref 0–0.5)
KETONES UR QL STRIP.AUTO: NEGATIVE MG/DL
LEUKOCYTE ESTERASE UR QL STRIP.AUTO: NEGATIVE
LYMPHOCYTES # BLD: 1.2 K/UL (ref 0.8–3.5)
LYMPHOCYTES NFR BLD: 25 % (ref 12–49)
MCH RBC QN AUTO: 30 PG (ref 26–34)
MCHC RBC AUTO-ENTMCNC: 32.3 G/DL (ref 30–36.5)
MCV RBC AUTO: 92.7 FL (ref 80–99)
MONOCYTES # BLD: 0.5 K/UL (ref 0–1)
MONOCYTES NFR BLD: 11 % (ref 5–13)
NEUTS SEG # BLD: 2.7 K/UL (ref 1.8–8)
NEUTS SEG NFR BLD: 54 % (ref 32–75)
NITRITE UR QL STRIP.AUTO: NEGATIVE
NRBC # BLD: 0 K/UL (ref 0–0.01)
NRBC BLD-RTO: 0 PER 100 WBC
PH UR STRIP: 6.5 [PH] (ref 5–8)
PLATELET # BLD AUTO: 212 K/UL (ref 150–400)
PMV BLD AUTO: 11.3 FL (ref 8.9–12.9)
POTASSIUM SERPL-SCNC: 3.3 MMOL/L (ref 3.5–5.1)
PROT SERPL-MCNC: 7.3 G/DL (ref 6.4–8.2)
PROT UR STRIP-MCNC: NEGATIVE MG/DL
RBC # BLD AUTO: 4.37 M/UL (ref 4.1–5.7)
RBC #/AREA URNS HPF: NORMAL /HPF (ref 0–5)
SARS-COV-2, COV2: NORMAL
SODIUM SERPL-SCNC: 140 MMOL/L (ref 136–145)
SP GR UR REFRACTOMETRY: 1.01 (ref 1–1.03)
TROPONIN I SERPL-MCNC: <0.05 NG/ML
UA: UC IF INDICATED,UAUC: NORMAL
UROBILINOGEN UR QL STRIP.AUTO: 0.2 EU/DL (ref 0.2–1)
WBC # BLD AUTO: 4.9 K/UL (ref 4.1–11.1)
WBC URNS QL MICRO: NORMAL /HPF (ref 0–4)

## 2021-09-23 PROCEDURE — 36415 COLL VENOUS BLD VENIPUNCTURE: CPT

## 2021-09-23 PROCEDURE — U0005 INFEC AGEN DETEC AMPLI PROBE: HCPCS

## 2021-09-23 PROCEDURE — 81001 URINALYSIS AUTO W/SCOPE: CPT

## 2021-09-23 PROCEDURE — 71045 X-RAY EXAM CHEST 1 VIEW: CPT

## 2021-09-23 PROCEDURE — 85025 COMPLETE CBC W/AUTO DIFF WBC: CPT

## 2021-09-23 PROCEDURE — 84484 ASSAY OF TROPONIN QUANT: CPT

## 2021-09-23 PROCEDURE — 99284 EMERGENCY DEPT VISIT MOD MDM: CPT

## 2021-09-23 PROCEDURE — 93005 ELECTROCARDIOGRAM TRACING: CPT

## 2021-09-23 PROCEDURE — 82550 ASSAY OF CK (CPK): CPT

## 2021-09-23 PROCEDURE — 85379 FIBRIN DEGRADATION QUANT: CPT

## 2021-09-23 PROCEDURE — 74011250637 HC RX REV CODE- 250/637: Performed by: PHYSICIAN ASSISTANT

## 2021-09-23 PROCEDURE — 80053 COMPREHEN METABOLIC PANEL: CPT

## 2021-09-23 RX ORDER — ACETAMINOPHEN 500 MG
1000 TABLET ORAL ONCE
Status: COMPLETED | OUTPATIENT
Start: 2021-09-23 | End: 2021-09-23

## 2021-09-23 RX ADMIN — ACETAMINOPHEN 1000 MG: 500 TABLET ORAL at 16:05

## 2021-09-23 NOTE — ED NOTES
Pt requested to leave prior to treatment completion. IV removed. AMA paperwork signed. EM Mauricio reviewed risk and follow up with patient. Pt ambulated off unit.

## 2021-09-23 NOTE — ED PROVIDER NOTES
EMERGENCY DEPARTMENT HISTORY AND PHYSICAL EXAM      Date: 9/23/2021  Patient Name: Niesha Underwood    History of Presenting Illness     Chief Complaint   Patient presents with    Cough     pt began with sx yesterday. He also had the covid vaccine yesterday, 1st dose of pfizer    Generalized Body Aches       History Provided By: Patient    HPI: Niesha Underwood, 48 y.o. male presents to the ED with cc of cough, myalgias, and SOB. The patient reports the myalgias started yesterday, progressed into cough and SOB today. He had the first dose of the COVID vaccine [employer]) yesterday, but noted the myalgias started before the vaccine was administered. The cough is dry in nature with associated SOB. The patient states he had an episode of SOB today with a near syncopal episode. He also notes associated rhinorrhea, watery eyes, diarrhea, fever (Tmax 99.5F), and chest pain. He states the chest pain occurs with coughing and is throbbing in nature. 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 The patient has tried ibuprofen with no relief, last dose of ibuprofen was this morning at 630. The patient denies ST, postnasal drip, dysuria, hematuria, abdominal pain, and vomiting. The patient denies history of PE/DVT, recent surgeries, hormone therapy, and calf pain. There are no other complaints, changes, or physical findings at this time. PCP: Teddy Isabel MD    No current facility-administered medications on file prior to encounter. Current Outpatient Medications on File Prior to Encounter   Medication Sig Dispense Refill    indomethacin (INDOCIN) 25 mg capsule TAKE 1 CAPSULE BY MOUTH THREE TIMES DAILY AS NEEDED 90 Capsule 1    atorvastatin (LIPITOR) 20 mg tablet Take 1 Tab by mouth daily. For cholesterol 90 Tab 3    lisinopriL (PRINIVIL, ZESTRIL) 10 mg tablet Take 1 Tab by mouth daily.  90 Tab 3    naproxen (NAPROSYN) 500 mg tablet Take 1 Tab by mouth two (2) times daily (with meals). For neck pain 60 Tab 3    acetaminophen (TYLENOL) 500 mg tablet Take 2 Tabs by mouth every twelve (12) hours as needed for Pain. Take 2 tabs with one naproxen for severe pain 60 Tab 1    chlorthalidone (HYGROTON) 25 mg tablet Take 1 Tab by mouth daily. For blood pressure 90 Tab 3    cetirizine (ZYRTEC) 10 mg tablet Take 1 Tab by mouth daily. 20 Tab 0       Past History     Past Medical History:  Past Medical History:   Diagnosis Date    Gout     H/O seasonal allergies     Hypertension     Other ill-defined conditions(619.89)     gout       Past Surgical History:  Past Surgical History:   Procedure Laterality Date    HX HEENT      eyes    HX ORTHOPAEDIC      right knee petella tendon repair    HX RETINAL DETACHMENT REPAIR         Family History:  Family History   Problem Relation Age of Onset    Diabetes Neg Hx        Social History:  Social History     Tobacco Use    Smoking status: Former Smoker     Types: Cigarettes    Smokeless tobacco: Never Used   Vaping Use    Vaping Use: Never used   Substance Use Topics    Alcohol use: Yes     Alcohol/week: 14.0 standard drinks     Types: 14 Cans of beer per week     Comment: \"6pack beer day\"    Drug use: No       Allergies: Allergies   Allergen Reactions    Tramadol Nausea and Vomiting         Review of Systems   Review of Systems   Constitutional: Positive for fever. Negative for chills. HENT: Positive for rhinorrhea. Negative for congestion, ear pain, postnasal drip and sore throat. Eyes: Positive for discharge (watery). Negative for pain. Respiratory: Positive for cough (dry) and shortness of breath. Negative for chest tightness. Cardiovascular: Positive for chest pain (with cough). Negative for palpitations. Gastrointestinal: Positive for diarrhea. Negative for abdominal pain, nausea and vomiting. Genitourinary: Negative. Negative for dysuria and hematuria. Musculoskeletal: Positive for back pain and myalgias. Negative for arthralgias. Skin: Negative. Negative for color change. Allergic/Immunologic: Negative. Negative for environmental allergies and food allergies. Neurological: Negative. Negative for dizziness and headaches. Psychiatric/Behavioral: Negative. Physical Exam   Physical Exam  Vitals reviewed. Constitutional:       General: He is not in acute distress. Appearance: He is well-developed. He is not diaphoretic. Comments: Pt appears sickly but non-toxic, awake and alert in NAD. HENT:      Head: Normocephalic and atraumatic. Right Ear: Tympanic membrane, ear canal and external ear normal.      Left Ear: Tympanic membrane, ear canal and external ear normal.      Nose: Rhinorrhea (mild, clear color) present. Right Nostril: No epistaxis. Left Nostril: No epistaxis. Mouth/Throat:      Pharynx: Uvula midline. Posterior oropharyngeal erythema present. No pharyngeal swelling, oropharyngeal exudate or uvula swelling. Tonsils: No tonsillar exudate or tonsillar abscesses. Eyes:      General:         Right eye: No discharge. Left eye: No discharge. Conjunctiva/sclera: Conjunctivae normal.      Comments: Pinpoint pupils b/l. Cardiovascular:      Rate and Rhythm: Normal rate. Heart sounds: Normal heart sounds. Pulmonary:      Effort: Pulmonary effort is normal. No respiratory distress. Breath sounds: Normal breath sounds. No wheezing or rales. Comments: Deep, dry active cough  Speaking in complete sentences, does not appear SOB. Chest:      Chest wall: Tenderness (anterior chest wall) present. Abdominal:      General: Bowel sounds are normal. There is no distension. Palpations: Abdomen is soft. Tenderness: There is no abdominal tenderness. There is no guarding. Comments: No CVA tenderness b/l. Musculoskeletal:         General: Tenderness (see Pulm) present. Normal range of motion.       Cervical back: Normal range of motion. Skin:     General: Skin is warm and dry. Coloration: Skin is not pale. Neurological:      Mental Status: He is alert and oriented to person, place, and time. Coordination: Coordination normal.      Comments: No focal neuro deficits. Psychiatric:         Behavior: Behavior normal.         Diagnostic Study Results     Labs -   No results found for this or any previous visit (from the past 12 hour(s)). Radiologic Studies -   No orders to display     CT Results  (Last 48 hours)    None        CXR Results  (Last 48 hours)    None          Medical Decision Making   I am the first provider for this patient. I reviewed the vital signs, available nursing notes, past medical history, past surgical history, family history and social history. Vital Signs-Reviewed the patient's vital signs. Patient Vitals for the past 12 hrs:   Temp Pulse Resp BP SpO2   09/23/21 1548 98 °F (36.7 °C) 80 16 (!) 155/134 100 %           Records Reviewed: Old Medical Records    Provider Notes (Medical Decision Making): COVID-19, viral illness, PNA, vaccine side effects  Rule out PE and MI    ED Course:   Initial assessment performed. The patients presenting problems have been discussed, and they are in agreement with the care plan formulated and outlined with them. I have encouraged them to ask questions as they arise throughout their visit. ED Course as of Sep 23 1727   Thu Sep 23, 2021   1633 EKG per my interpretation normal sinus rhythm, rate 76 bpm, leftward axis, diffuse T wave inversions present but unchanged when compared to prior EKG dated 1/29/2021. [AK]      ED Course User Index  [AK] Kendrick Arora MD         NOTES   :  5:31 PM  Patient is requesting to leave. He states he lives around the corner and can come back if needed. He states he is tired, wants to take a shower and go to bed. Provider discussed risk and dangers of worsening symptoms, possible cardiac related event, PE/DVT, or death. Patient conveys understanding of these concerns and states provider can call him to come back if labs are abnormal. Provider also discussed worsening signs and symptoms including F/C, SOB, CP, vomiting, dizziness, and syncope for which he should return. The patient is in agreement. He is AA&O x 3 and has not had any narcotic or sedating medication today in the ED. Patient allowed vitals to be retaken. He is not tachypnic or tachycardic. His SPO2 on room air is 100%. Patient is ready for discharge  EM Craft      DISCHARGE PLAN:  1. Discharge Medication List as of 9/23/2021  5:34 PM        2. Follow-up Information     Follow up With Specialties Details Why Contact Info    [facility] EMERGENCY DEPT Emergency Medicine  As needed or, If symptoms worsen [address] [place] [address]  [phone]        3. COVID-19 precautions as discussed    4. Return to ED if worse     Diagnosis     Clinical Impression:   1. Cough    2. Chest pain, unspecified type        Attestations:    EM Craft    Please note that this dictation was completed with [employer], the computer voice recognition software. Quite often unanticipated grammatical, syntax, homophones, and other interpretive errors are inadvertently transcribed by the computer software. Please disregard these errors. Please excuse any errors that have escaped final proofreading. Thank you.

## 2021-09-23 NOTE — Clinical Note
[address]  [facility] EMERGENCY DEPT  [address] [facility]  [place] [address]  [phone]    Work/School Note    Date: 9/23/2021     To Whom It May concern:    Jamey Mccollum was evaulated by the following provider(s):  Attending Provider: Chris Cade MD  Physician Assistant: Jessica Madison, [address] virus is suspected. Per the CDC guidelines we recommend home isolation until the following conditions are all met:    1. At least 10 days have passed since symptoms first appeared and  2. At least 24 hours have passed since last fever without the use of fever-reducing medications and  3.  Symptoms (e.g., cough, shortness of breath) have improved    Sincerely,          Taylor Bustamante, [address]

## 2021-09-24 ENCOUNTER — PATIENT OUTREACH (OUTPATIENT)
Dept: CASE MANAGEMENT | Age: 51
End: 2021-09-24

## 2021-09-24 LAB
ATRIAL RATE: 76 BPM
CALCULATED P AXIS, ECG09: -5 DEGREES
CALCULATED R AXIS, ECG10: -14 DEGREES
CALCULATED T AXIS, ECG11: -32 DEGREES
DIAGNOSIS, 93000: NORMAL
P-R INTERVAL, ECG05: 194 MS
Q-T INTERVAL, ECG07: 392 MS
QRS DURATION, ECG06: 96 MS
QTC CALCULATION (BEZET), ECG08: 441 MS
SARS-COV-2, XPLCVT: NOT DETECTED
SOURCE, COVRS: NORMAL
VENTRICULAR RATE, ECG03: 76 BPM

## 2021-09-24 NOTE — PROGRESS NOTES
Educated patient about risk for severe COVID-19 due to risk factors according to CDC guidelines. LPN CC reviewed discharge instructions, medical action plan and red flag symptoms with the patient who verbalized understanding. Discussed COVID vaccination status: no. Education provided on COVID-19 vaccination as appropriate. Discussed exposure protocols and quarantine with CDC Guidelines. Patient was given an opportunity to verbalize any questions and concerns and agrees to contact LPN CC or health care provider for questions related to their healthcare.

## 2021-10-25 RX ORDER — CHLORTHALIDONE 25 MG/1
25 TABLET ORAL DAILY
Qty: 90 TABLET | Refills: 3 | Status: SHIPPED | OUTPATIENT
Start: 2021-10-25 | End: 2022-10-30

## 2021-12-10 ENCOUNTER — HOSPITAL ENCOUNTER (EMERGENCY)
Age: 51
Discharge: HOME | End: 2021-12-10
Attending: EMERGENCY MEDICINE

## 2021-12-10 VITALS
TEMPERATURE: 98 F | HEIGHT: 75 IN | WEIGHT: 232.81 LBS | BODY MASS INDEX: 28.95 KG/M2 | SYSTOLIC BLOOD PRESSURE: 156 MMHG | DIASTOLIC BLOOD PRESSURE: 107 MMHG | HEART RATE: 103 BPM | RESPIRATION RATE: 16 BRPM | OXYGEN SATURATION: 100 %

## 2021-12-10 DIAGNOSIS — M10.9 ACUTE GOUT INVOLVING TOE OF LEFT FOOT, UNSPECIFIED CAUSE: Primary | ICD-10-CM

## 2021-12-10 PROCEDURE — 74011250637 HC RX REV CODE- 250/637: Performed by: EMERGENCY MEDICINE

## 2021-12-10 PROCEDURE — 99283 EMERGENCY DEPT VISIT LOW MDM: CPT

## 2021-12-10 RX ORDER — ALLOPURINOL 100 MG/1
100 TABLET ORAL DAILY
Qty: 60 TABLET | Refills: 3 | Status: SHIPPED | OUTPATIENT
Start: 2021-12-10

## 2021-12-10 RX ORDER — COLCHICINE 0.6 MG/1
TABLET ORAL
Qty: 30 TABLET | Refills: 0 | OUTPATIENT
Start: 2021-12-10 | End: 2022-03-26

## 2021-12-10 RX ORDER — OXYCODONE AND ACETAMINOPHEN 5; 325 MG/1; MG/1
1 TABLET ORAL
Status: COMPLETED | OUTPATIENT
Start: 2021-12-10 | End: 2021-12-10

## 2021-12-10 RX ORDER — OXYCODONE AND ACETAMINOPHEN 5; 325 MG/1; MG/1
1 TABLET ORAL
Qty: 4 TABLET | Refills: 0 | Status: SHIPPED | OUTPATIENT
Start: 2021-12-10 | End: 2021-12-12

## 2021-12-10 RX ORDER — COLCHICINE 0.6 MG/1
1.2 TABLET ORAL
Status: COMPLETED | OUTPATIENT
Start: 2021-12-10 | End: 2021-12-10

## 2021-12-10 RX ADMIN — COLCHICINE 1.2 MG: 0.6 TABLET, FILM COATED ORAL at 20:10

## 2021-12-10 RX ADMIN — OXYCODONE AND ACETAMINOPHEN 1 TABLET: 5; 325 TABLET ORAL at 20:10

## 2021-12-11 NOTE — ED NOTES
2012 - Patient discharge by Zunilda Saha MD - pt sent to the [facility], via wheelchair for safety -  Discharge information / home RX / and reasons to return to the ED were reviewed by the doctor.

## 2021-12-11 NOTE — ED PROVIDER NOTES
EMERGENCY DEPARTMENT HISTORY AND PHYSICAL EXAM      Date: 12/10/2021  Patient Name: Kimber Rosa  Patient Age and Sex: 46 y.o. male     History of Presenting Illness     Chief Complaint   Patient presents with    Foot Pain     History of gout - flared up for a day. indomethicin not helping. History Provided By: Patient    HPI: Kimber Rosa is a 70-year-old male with a history of gout presenting with gout flare. Patient states that today started having terrible pain in his left great toe. Patient states he has had a longstanding history of gout and knows it was triggered by drinking some beer as well as seafood in the last couple of days. Patient states that he has indomethacin at home but has not been helping. Patient states that he used to be on allopurinol but ran out of the prescription and has not gotten a refill. Also used to have colchicine but does not have any of that anymore. There are no other complaints, changes, or physical findings at this time. PCP: Gretchen Blum MD    No current facility-administered medications on file prior to encounter. Current Outpatient Medications on File Prior to Encounter   Medication Sig Dispense Refill    chlorthalidone (HYGROTON) 25 mg tablet Take 1 Tablet by mouth daily. For blood pressure 90 Tablet 3    indomethacin (INDOCIN) 25 mg capsule TAKE 1 CAPSULE BY MOUTH THREE TIMES DAILY AS NEEDED 90 Capsule 1    atorvastatin (LIPITOR) 20 mg tablet Take 1 Tab by mouth daily. For cholesterol 90 Tab 3    lisinopriL (PRINIVIL, ZESTRIL) 10 mg tablet Take 1 Tab by mouth daily. 90 Tab 3    naproxen (NAPROSYN) 500 mg tablet Take 1 Tab by mouth two (2) times daily (with meals). For neck pain 60 Tab 3    acetaminophen (TYLENOL) 500 mg tablet Take 2 Tabs by mouth every twelve (12) hours as needed for Pain. Take 2 tabs with one naproxen for severe pain 60 Tab 1    cetirizine (ZYRTEC) 10 mg tablet Take 1 Tab by mouth daily.  20 Tab 0       Past History Past Medical History:  Past Medical History:   Diagnosis Date    Gout     H/O seasonal allergies     Hypertension     Other ill-defined conditions(439.89)     gout       Past Surgical History:  Past Surgical History:   Procedure Laterality Date    HX HEENT      eyes    HX ORTHOPAEDIC      right knee petella tendon repair    HX RETINAL DETACHMENT REPAIR         Family History:  Family History   Problem Relation Age of Onset    Diabetes Neg Hx        Social History:  Social History     Tobacco Use    Smoking status: Former Smoker     Types: Cigarettes    Smokeless tobacco: Never Used   Vaping Use    Vaping Use: Never used   Substance Use Topics    Alcohol use: Yes     Alcohol/week: 14.0 standard drinks     Types: 14 Cans of beer per week     Comment: \"6pack beer day\"    Drug use: No       Allergies: Allergies   Allergen Reactions    Tramadol Nausea and Vomiting         Review of Systems   Review of Systems   Constitutional: Negative for chills and fever. Respiratory: Negative for cough and shortness of breath. Cardiovascular: Negative for chest pain. Gastrointestinal: Negative for abdominal pain, constipation, diarrhea, nausea and vomiting. Genitourinary: Negative for dysuria, frequency and hematuria. Musculoskeletal: Positive for arthralgias and joint swelling. Neurological: Negative for weakness and numbness. All other systems reviewed and are negative. Physical Exam   Physical Exam  Constitutional:       General: He is not in acute distress. Appearance: He is well-developed. HENT:      Head: Normocephalic and atraumatic. Nose: Nose normal.      Mouth/Throat:      Mouth: Mucous membranes are moist.   Eyes:      Extraocular Movements: Extraocular movements intact. Conjunctiva/sclera: Conjunctivae normal.   Cardiovascular:      Comments: Well perfused  Pulmonary:      Effort: Pulmonary effort is normal. No respiratory distress.    Musculoskeletal:         General: Normal range of motion. Cervical back: Normal range of motion. Comments: Left great toe PIP swollen and red TTP. Neurological:      General: No focal deficit present. Mental Status: He is alert and oriented to person, place, and time. Psychiatric:         Mood and Affect: Mood normal.          Diagnostic Study Results     Labs -   No results found for this or any previous visit (from the past 12 hour(s)). Radiologic Studies -   No orders to display     CT Results  (Last 48 hours)    None        CXR Results  (Last 48 hours)    None            Medical Decision Making   I am the first provider for this patient. I reviewed the vital signs, available nursing notes, past medical history, past surgical history, family history and social history. Vital Signs-Reviewed the patient's vital signs. Patient Vitals for the past 12 hrs:   Temp Pulse Resp BP SpO2   12/10/21 1925 98 °F (36.7 °C) (!) 103 16 (!) 156/107 100 %       Records Reviewed: Nursing Notes and Old Medical Records    Provider Notes (Medical Decision Making):   Patient presenting with left great toe pain. Looks like gout and sounds like gout. We will give him colchicine and Percocet here. Will give analgesics and colchicine to go home with. Counseled on not drinking alcohol and avoiding seafood given that it is triggering his gout every time. ED Course:   Initial assessment performed. The patients presenting problems have been discussed, and they are in agreement with the care plan formulated and outlined with them. I have encouraged them to ask questions as they arise throughout their visit. Critical Care Time:   0    Disposition:  Discharge Note:  The patient has been re-evaluated and is ready for discharge. Reviewed available results with patient. Counseled patient on diagnosis and care plan. Patient has expressed understanding, and all questions have been answered.  Patient agrees with plan and agrees to follow up as recommended, or to return to the ED if their symptoms worsen. Discharge instructions have been provided and explained to the patient, along with reasons to return to the ED. PLAN:  Discharge Medication List as of 12/10/2021  8:08 PM      START taking these medications    Details   allopurinoL (ZYLOPRIM) 100 mg tablet Take 1 Tablet by mouth daily. , Normal, Disp-60 Tablet, R-3      colchicine 0.6 mg tablet When you have an exacerbation, please make sure to take 1.2 mg or 2 tablets on day 1 and then 1 tablet daily until the pain and swelling resolved., Normal, Disp-30 Tablet, R-0      oxyCODONE-acetaminophen (Percocet) 5-325 mg per tablet Take 1 Tablet by mouth every six (6) hours as needed for Pain for up to 2 days. Max Daily Amount: 4 Tablets., Normal, Disp-4 Tablet, R-0         CONTINUE these medications which have NOT CHANGED    Details   chlorthalidone (HYGROTON) 25 mg tablet Take 1 Tablet by mouth daily. For blood pressure, Normal, Disp-90 Tablet, R-3      indomethacin (INDOCIN) 25 mg capsule TAKE 1 CAPSULE BY MOUTH THREE TIMES DAILY AS NEEDED, Normal, Disp-90 Capsule, R-1      atorvastatin (LIPITOR) 20 mg tablet Take 1 Tab by mouth daily. For cholesterol, Normal, Disp-90 Tab, R-3      lisinopriL (PRINIVIL, ZESTRIL) 10 mg tablet Take 1 Tab by mouth daily. , Normal, Disp-90 Tab, R-3      naproxen (NAPROSYN) 500 mg tablet Take 1 Tab by mouth two (2) times daily (with meals). For neck pain, Normal, Disp-60 Tab, R-3      acetaminophen (TYLENOL) 500 mg tablet Take 2 Tabs by mouth every twelve (12) hours as needed for Pain. Take 2 tabs with one naproxen for severe pain, Normal, Disp-60 Tab, R-1      cetirizine (ZYRTEC) 10 mg tablet Take 1 Tab by mouth daily. , Print, Disp-20 Tab,R-0         1.   2.   Follow-up Information     Follow up With Specialties Details Why Contact Info    Yennifer Huang MD [facility] Medicine Schedule an appointment as soon as possible for a visit  About medication refills Dante Vega [address]  [phone]          3. Return to ED if worse     Diagnosis     Clinical Impression:   1. Acute gout involving toe of left foot, unspecified cause        Attestations:    Derrek Martin M.D. Please note that this dictation was completed with [employer], the computer voice recognition software. Quite often unanticipated grammatical, syntax, homophones, and other interpretive errors are inadvertently transcribed by the computer software. Please disregard these errors. Please excuse any errors that have escaped final proofreading. Thank you.

## 2022-03-20 PROBLEM — I21.4 NON-ST ELEVATION (NSTEMI) MYOCARDIAL INFARCTION (HCC): Status: ACTIVE | Noted: 2021-01-29

## 2022-03-24 RX ORDER — ATENOLOL 25 MG/1
25 TABLET ORAL DAILY
Qty: 90 TABLET | Refills: 0 | Status: SHIPPED | OUTPATIENT
Start: 2022-03-24 | End: 2022-06-27

## 2022-03-26 ENCOUNTER — HOSPITAL ENCOUNTER (EMERGENCY)
Age: 52
Discharge: HOME | End: 2022-03-26
Attending: EMERGENCY MEDICINE

## 2022-03-26 VITALS
WEIGHT: 232 LBS | RESPIRATION RATE: 16 BRPM | HEIGHT: 75 IN | BODY MASS INDEX: 28.85 KG/M2 | TEMPERATURE: 98.9 F | HEART RATE: 89 BPM | DIASTOLIC BLOOD PRESSURE: 102 MMHG | OXYGEN SATURATION: 100 % | SYSTOLIC BLOOD PRESSURE: 142 MMHG

## 2022-03-26 DIAGNOSIS — M10.9 GOUTY ARTHRITIS OF RIGHT GREAT TOE: ICD-10-CM

## 2022-03-26 DIAGNOSIS — M10.061 ACUTE IDIOPATHIC GOUT OF RIGHT KNEE: ICD-10-CM

## 2022-03-26 DIAGNOSIS — M10.072 ACUTE IDIOPATHIC GOUT OF LEFT ANKLE: ICD-10-CM

## 2022-03-26 DIAGNOSIS — R52 INTRACTABLE PAIN: Primary | ICD-10-CM

## 2022-03-26 PROCEDURE — 99283 EMERGENCY DEPT VISIT LOW MDM: CPT

## 2022-03-26 PROCEDURE — 74011250637 HC RX REV CODE- 250/637: Performed by: PHYSICIAN ASSISTANT

## 2022-03-26 RX ORDER — OXYCODONE AND ACETAMINOPHEN 5; 325 MG/1; MG/1
1 TABLET ORAL
Status: COMPLETED | OUTPATIENT
Start: 2022-03-26 | End: 2022-03-26

## 2022-03-26 RX ORDER — PREDNISONE 10 MG/1
TABLET ORAL
Qty: 30 TABLET | Refills: 0 | Status: SHIPPED | OUTPATIENT
Start: 2022-03-26 | End: 2022-04-05

## 2022-03-26 RX ORDER — COLCHICINE 0.6 MG/1
0.6 CAPSULE ORAL DAILY
Qty: 30 CAPSULE | Refills: 0 | Status: SHIPPED | OUTPATIENT
Start: 2022-03-26 | End: 2022-04-25

## 2022-03-26 RX ORDER — OXYCODONE AND ACETAMINOPHEN 5; 325 MG/1; MG/1
1 TABLET ORAL
Qty: 12 TABLET | Refills: 0 | Status: SHIPPED | OUTPATIENT
Start: 2022-03-26 | End: 2022-03-29

## 2022-03-26 RX ADMIN — OXYCODONE HYDROCHLORIDE AND ACETAMINOPHEN 1 TABLET: 5; 325 TABLET ORAL at 07:43

## 2022-03-26 NOTE — ED PROVIDER NOTES
EMERGENCY DEPARTMENT HISTORY AND PHYSICAL EXAM      Date: 3/26/2022  Patient Name: Gonzales Casas    History of Presenting Illness     Chief Complaint   Patient presents with    Gout     left foot flare and it is starting to come in right knee and right foot. pain has been onset yesterday and today       History Provided By: Patient    HPI: Gonzales Casas, 46 y.o. male with a past medical history of hypertension and gout who presents the emergency department with swelling and pain in multiple joints. Patient noticed yesterday he was having some pain and swelling around his left ankle. He has also noticed some pain and swelling around his right great toe and right knee. He has a history of recurrent gout flares and states this feels identical.  He denies any other symptoms. No fevers, myalgias, sore throat, cough congestion, difficulty breathing abdominal pain, nausea, vomiting, diarrhea, penile discharge, dysuria, hematuria, bloody stool. He denies any history of IV drug use. There are no other complaints, changes, or physical findings at this time. PCP: Jak Juarez MD    No current facility-administered medications on file prior to encounter. Current Outpatient Medications on File Prior to Encounter   Medication Sig Dispense Refill    atenoloL (TENORMIN) 25 mg tablet Take 1 Tablet by mouth daily. 90 Tablet 0    allopurinoL (ZYLOPRIM) 100 mg tablet Take 1 Tablet by mouth daily. 60 Tablet 3    [DISCONTINUED] colchicine 0.6 mg tablet When you have an exacerbation, please make sure to take 1.2 mg or 2 tablets on day 1 and then 1 tablet daily until the pain and swelling resolved. 30 Tablet 0    chlorthalidone (HYGROTON) 25 mg tablet Take 1 Tablet by mouth daily. For blood pressure 90 Tablet 3    indomethacin (INDOCIN) 25 mg capsule TAKE 1 CAPSULE BY MOUTH THREE TIMES DAILY AS NEEDED 90 Capsule 1    atorvastatin (LIPITOR) 20 mg tablet Take 1 Tab by mouth daily.  For cholesterol 90 Tab 3    lisinopriL (PRINIVIL, ZESTRIL) 10 mg tablet Take 1 Tab by mouth daily. 90 Tab 3    naproxen (NAPROSYN) 500 mg tablet Take 1 Tab by mouth two (2) times daily (with meals). For neck pain 60 Tab 3    acetaminophen (TYLENOL) 500 mg tablet Take 2 Tabs by mouth every twelve (12) hours as needed for Pain. Take 2 tabs with one naproxen for severe pain 60 Tab 1    cetirizine (ZYRTEC) 10 mg tablet Take 1 Tab by mouth daily. 20 Tab 0       Past History     Past Medical History:  Past Medical History:   Diagnosis Date    Gout     H/O seasonal allergies     Hypertension     Other ill-defined conditions(859.97)     gout       Past Surgical History:  Past Surgical History:   Procedure Laterality Date    HX HEENT      eyes    HX ORTHOPAEDIC      right knee petella tendon repair    HX RETINAL DETACHMENT REPAIR         Family History:  Family History   Problem Relation Age of Onset    Diabetes Neg Hx        Social History:  Social History     Tobacco Use    Smoking status: Former Smoker     Types: Cigarettes    Smokeless tobacco: Never Used   Vaping Use    Vaping Use: Never used   Substance Use Topics    Alcohol use: Yes     Alcohol/week: 14.0 standard drinks     Types: 14 Cans of beer per week     Comment: \"6pack beer day\"    Drug use: No       Allergies: Allergies   Allergen Reactions    Tramadol Nausea and Vomiting         Review of Systems   Review of Systems   Constitutional: Negative for chills and fever. HENT: Negative for congestion and sore throat. Respiratory: Negative for cough and shortness of breath. Cardiovascular: Negative for chest pain. Gastrointestinal: Negative for abdominal pain, diarrhea, nausea and vomiting. Genitourinary: Negative for dysuria and hematuria. Musculoskeletal: Positive for arthralgias and joint swelling. Negative for myalgias. Skin: Negative for rash. Neurological: Negative for headaches. All other systems reviewed and are negative.       Physical Exam Physical Exam  Vitals and nursing note reviewed. Constitutional:       General: He is not in acute distress. Appearance: Normal appearance. He is normal weight. He is not ill-appearing, toxic-appearing or diaphoretic. HENT:      Head: Atraumatic. Right Ear: External ear normal.      Left Ear: External ear normal.      Nose: Nose normal.      Mouth/Throat:      Mouth: Mucous membranes are moist.   Eyes:      Extraocular Movements: Extraocular movements intact. Conjunctiva/sclera: Conjunctivae normal.   Cardiovascular:      Rate and Rhythm: Normal rate and regular rhythm. Pulses: Normal pulses. Heart sounds: Normal heart sounds. No murmur heard. No friction rub. No gallop. Pulmonary:      Effort: Pulmonary effort is normal. No respiratory distress. Breath sounds: Normal breath sounds. No stridor. No wheezing, rhonchi or rales. Abdominal:      General: Abdomen is flat. There is no distension. Palpations: Abdomen is soft. Tenderness: There is no abdominal tenderness. There is no guarding or rebound. Musculoskeletal:         General: Swelling and tenderness present. Cervical back: Neck supple. Comments: Moderate swelling around left ankle, right great toe, right knee. Joints tender to touch and painful with passive/active range of motion. No warmth, erythema, cellulitic changes, streaking lymphadenitis, or ascending redness/swelling of the extremities. Skin:     General: Skin is warm. Neurological:      Mental Status: He is alert and oriented to person, place, and time. Psychiatric:         Mood and Affect: Mood normal.         Behavior: Behavior normal.         Thought Content: Thought content normal.         Judgment: Judgment normal.         Diagnostic Study Results     Labs -   No results found for this or any previous visit (from the past 12 hour(s)).     Radiologic Studies -   No orders to display     CT Results  (Last 48 hours)    None CXR Results  (Last 48 hours)    None            Medical Decision Making   I am the first provider for this patient. I reviewed the vital signs, available nursing notes, past medical history, past surgical history, family history and social history. Vital Signs-Reviewed the patient's vital signs. Patient Vitals for the past 12 hrs:   Temp Pulse Resp BP SpO2   03/26/22 0730 98.9 °F (37.2 °C) 89 16 (!) 142/102 100 %       Records Reviewed: Nursing Notes    Provider Notes (Medical Decision Making):   Patient presents to the emergency department with polyarticular swelling and pain. He is a well-established history of recurrent gout flares and states this is identical.  He is not having any systemic symptoms and his vital signs are normal in triage. Given this information and the polyarticular nature of this presentation, an infectious etiology of his symptoms is highly unlikely. Patient did not want any further work-up in the ED and elected to be treated for gout empirically. Patient was given a 10-day taper course of prednisone, colchicine, and treated with Percocet for his pain. Patient advised on strict return precautions to the emergency department and close follow-up with his PCP. Patient expressed understanding and agreement with the discharge instructions and treatment plan. Patient's questions answered satisfactorily. ED Course:   Initial assessment performed. The patients presenting problems have been discussed, and they are in agreement with the care plan formulated and outlined with them. I have encouraged them to ask questions as they arise throughout their visit. Critical Care Time: None    Disposition:  discharge    PLAN:  1. Current Discharge Medication List      START taking these medications    Details   colchicine (MITIGARE) 0.6 mg capsule Take 1 Capsule by mouth daily for 30 days.  Take 2 tablets on day 1, then 1 each day until your symptoms resolve  Qty: 30 Capsule, Refills: 0  Start date: 3/26/2022, End date: 4/25/2022      predniSONE (DELTASONE) 10 mg tablet Take 50 mg by mouth daily for 2 days, THEN 40 mg daily for 2 days, THEN 30 mg daily for 2 days, THEN 20 mg daily for 2 days, THEN 10 mg daily for 2 days. Qty: 30 Tablet, Refills: 0  Start date: 3/26/2022, End date: 4/5/2022      oxyCODONE-acetaminophen (Percocet) 5-325 mg per tablet Take 1 Tablet by mouth every six (6) hours as needed for Pain for up to 3 days. Max Daily Amount: 4 Tablets. Qty: 12 Tablet, Refills: 0  Start date: 3/26/2022, End date: 3/29/2022    Associated Diagnoses: Intractable pain           2. Follow-up Information    None       Return to ED if worse     Diagnosis     Clinical Impression:   1. Intractable pain    2. Acute idiopathic gout of left ankle    3. Gouty arthritis of right great toe    4. Acute idiopathic gout of right knee    12:58 PM  I was personally available for consultation in the emergency department. I have reviewed the chart and agree with the documentation recorded by the [facility], including the assessment, treatment plan, and disposition. Nancy Galeazzi, MD        Please note that this dictation was completed with [employer], the computer voice recognition software. Quite often unanticipated grammatical, syntax, homophones, and other interpretive errors are inadvertently transcribed by the computer software. Please disregards these errors. Please excuse any errors that have escaped final proofreading.

## 2022-05-18 ENCOUNTER — HOSPITAL ENCOUNTER (EMERGENCY)
Age: 52
Discharge: HOME | End: 2022-05-18
Attending: EMERGENCY MEDICINE
Payer: COMMERCIAL

## 2022-05-18 VITALS
WEIGHT: 230 LBS | DIASTOLIC BLOOD PRESSURE: 89 MMHG | TEMPERATURE: 97.7 F | HEART RATE: 67 BPM | RESPIRATION RATE: 18 BRPM | HEIGHT: 75 IN | BODY MASS INDEX: 28.6 KG/M2 | OXYGEN SATURATION: 100 % | SYSTOLIC BLOOD PRESSURE: 147 MMHG

## 2022-05-18 DIAGNOSIS — M25.571 ACUTE RIGHT ANKLE PAIN: ICD-10-CM

## 2022-05-18 DIAGNOSIS — M10.9 ACUTE GOUT OF RIGHT ANKLE, UNSPECIFIED CAUSE: Primary | ICD-10-CM

## 2022-05-18 PROCEDURE — 99283 EMERGENCY DEPT VISIT LOW MDM: CPT

## 2022-05-18 PROCEDURE — 74011250637 HC RX REV CODE- 250/637: Performed by: PHYSICIAN ASSISTANT

## 2022-05-18 PROCEDURE — 74011636637 HC RX REV CODE- 636/637: Performed by: PHYSICIAN ASSISTANT

## 2022-05-18 RX ORDER — PREDNISONE 20 MG/1
60 TABLET ORAL
Status: COMPLETED | OUTPATIENT
Start: 2022-05-18 | End: 2022-05-18

## 2022-05-18 RX ORDER — OXYCODONE AND ACETAMINOPHEN 5; 325 MG/1; MG/1
1 TABLET ORAL
Status: COMPLETED | OUTPATIENT
Start: 2022-05-18 | End: 2022-05-18

## 2022-05-18 RX ORDER — PREDNISONE 10 MG/1
TABLET ORAL
Qty: 48 TABLET | Refills: 0 | Status: SHIPPED | OUTPATIENT
Start: 2022-05-18

## 2022-05-18 RX ORDER — COLCHICINE 0.6 MG/1
0.6 TABLET ORAL DAILY
Qty: 20 TABLET | Refills: 0 | OUTPATIENT
Start: 2022-05-18 | End: 2022-08-13

## 2022-05-18 RX ORDER — OXYCODONE HYDROCHLORIDE 5 MG/1
5 TABLET ORAL
Qty: 15 TABLET | Refills: 0 | Status: SHIPPED | OUTPATIENT
Start: 2022-05-18 | End: 2022-05-21

## 2022-05-18 RX ADMIN — PREDNISONE 60 MG: 20 TABLET ORAL at 10:16

## 2022-05-18 RX ADMIN — OXYCODONE AND ACETAMINOPHEN 1 TABLET: 5; 325 TABLET ORAL at 10:16

## 2022-05-18 NOTE — DISCHARGE INSTRUCTIONS
All narcotic pain medications have the potential to cause constipation. Would recommend daily MiraLAX use while using pain medications increase frequency as needed to keep stools sof thanks t and moving.

## 2022-05-18 NOTE — ED NOTES
Discharge paperwork reviewed with patient, no questions at this time. Patient will be transferred to vehicle via wheelchair and Stefani Flores (pts fiancee) is driving him home.

## 2022-05-18 NOTE — ED PROVIDER NOTES
EMERGENCY DEPARTMENT HISTORY AND PHYSICAL EXAM      Date: 5/18/2022  Patient Name: Arnold Lemus    History of Presenting Illness     Chief Complaint   Patient presents with    Gout     Patient to triage w c/o gout flair up to the R ankle. He reports when he comes here he gets a PERCOCET. History Provided By: Patient    HPI: Arnold Lemus, 46 y.o. male presents to the ED with cc of right ankle  Pain. Pt has history of gout. He states that his pain began yesterday. He states his pain is rated 10 out of 10 today worse with touch weightbearing or any movement. He states that is consistent with his gout pain in the past.  He denies any fever or chills. He denies any cough or cold symptoms. He denies any nausea or vomiting. He denies any recent trauma or accidents. There are no other complaints, changes, or physical findings at this time. PCP: Mirta Robles MD    No current facility-administered medications on file prior to encounter. Current Outpatient Medications on File Prior to Encounter   Medication Sig Dispense Refill    atenoloL (TENORMIN) 25 mg tablet Take 1 Tablet by mouth daily. 90 Tablet 0    allopurinoL (ZYLOPRIM) 100 mg tablet Take 1 Tablet by mouth daily. 60 Tablet 3    chlorthalidone (HYGROTON) 25 mg tablet Take 1 Tablet by mouth daily. For blood pressure 90 Tablet 3    indomethacin (INDOCIN) 25 mg capsule TAKE 1 CAPSULE BY MOUTH THREE TIMES DAILY AS NEEDED 90 Capsule 1    atorvastatin (LIPITOR) 20 mg tablet Take 1 Tab by mouth daily. For cholesterol 90 Tab 3    lisinopriL (PRINIVIL, ZESTRIL) 10 mg tablet Take 1 Tab by mouth daily. 90 Tab 3    naproxen (NAPROSYN) 500 mg tablet Take 1 Tab by mouth two (2) times daily (with meals). For neck pain 60 Tab 3    acetaminophen (TYLENOL) 500 mg tablet Take 2 Tabs by mouth every twelve (12) hours as needed for Pain.  Take 2 tabs with one naproxen for severe pain 60 Tab 1    cetirizine (ZYRTEC) 10 mg tablet Take 1 Tab by mouth daily. 20 Tab 0       Past History     Past Medical History:  Past Medical History:   Diagnosis Date    Gout     H/O seasonal allergies     Hypertension     Other ill-defined conditions(799.89)     gout       Past Surgical History:  Past Surgical History:   Procedure Laterality Date    HX HEENT      eyes    HX ORTHOPAEDIC      right knee petella tendon repair    HX RETINAL DETACHMENT REPAIR         Family History:  Family History   Problem Relation Age of Onset    Diabetes Neg Hx        Social History:  Social History     Tobacco Use    Smoking status: Former Smoker     Types: Cigarettes    Smokeless tobacco: Never Used   Vaping Use    Vaping Use: Never used   Substance Use Topics    Alcohol use: Yes     Alcohol/week: 14.0 standard drinks     Types: 14 Cans of beer per week     Comment: \"6pack beer day\"    Drug use: No       Allergies: Allergies   Allergen Reactions    Tramadol Nausea and Vomiting         Review of Systems   Review of Systems   Constitutional: Negative. Negative for appetite change, chills, fatigue and fever. HENT: Negative. Negative for congestion, rhinorrhea, sinus pressure and sore throat. Eyes: Negative. Respiratory: Negative. Negative for cough, choking, chest tightness, shortness of breath and wheezing. Cardiovascular: Negative. Negative for chest pain, palpitations and leg swelling. Gastrointestinal: Negative for abdominal pain, constipation, diarrhea, nausea and vomiting. Endocrine: Negative. Genitourinary: Negative. Negative for difficulty urinating, dysuria, flank pain and urgency. Musculoskeletal:        Right ankle pain     Skin: Negative. Neurological: Negative. Negative for dizziness, speech difficulty, weakness, light-headedness, numbness and headaches. Psychiatric/Behavioral: Negative. All other systems reviewed and are negative. Physical Exam   Physical Exam  Vitals and nursing note reviewed.    Constitutional:       General: He is not in acute distress. Appearance: He is well-developed. He is not diaphoretic. HENT:      Head: Normocephalic and atraumatic. Mouth/Throat:      Mouth: Mucous membranes are moist.      Pharynx: No oropharyngeal exudate. Eyes:      Extraocular Movements: Extraocular movements intact. Conjunctiva/sclera: Conjunctivae normal.      Pupils: Pupils are equal, round, and reactive to light. Neck:      Vascular: No JVD. Trachea: No tracheal deviation. Cardiovascular:      Rate and Rhythm: Normal rate and regular rhythm. Heart sounds: Normal heart sounds. No murmur heard. Pulmonary:      Effort: Pulmonary effort is normal. No respiratory distress. Breath sounds: Normal breath sounds. No stridor. No wheezing or rales. Chest:      Chest wall: No tenderness. Musculoskeletal:         General: No tenderness. Normal range of motion. Cervical back: Normal range of motion and neck supple. Skin:     General: Skin is warm and dry. Capillary Refill: Capillary refill takes less than 2 seconds. Neurological:      Mental Status: He is alert and oriented to person, place, and time. Cranial Nerves: No cranial nerve deficit. Comments: No gross motor or sensory deficits    Psychiatric:         Mood and Affect: Mood normal.         Behavior: Behavior normal.         Diagnostic Study Results     Labs -  None    Radiologic Studies -   No orders to display         Medical Decision Making   I am the first provider for this patient. I reviewed the vital signs, available nursing notes, past medical history, past surgical history, family history and social history. Vital Signs-Reviewed the patient's vital signs.   Patient Vitals for the past 12 hrs:   Temp Pulse Resp BP SpO2   05/18/22 0923 97.7 °F (36.5 °C) 67 18 (!) 147/89 100 %       Records Reviewed: Nursing Notes, Old Medical Records, Previous Radiology Studies and Previous Laboratory Studies, last 2 ED visits 3/26 and 12/10 both for acute gout. Provider Notes (Medical Decision Making):   DDx- Gouty arthritis      ED Course:   Initial assessment performed. The patients presenting problems have been discussed, and they are in agreement with the care plan formulated and outlined with them. I have encouraged them to ask questions as they arise throughout their visit. Patient seen and evaluated patient has history of gout symptoms are consistent with gout we will dose with pain medication and steroids here as he has a ride home will discharge home with colchicine prednisone and pain medication. Disposition:  DC home    DISCHARGE PLAN:  1. Current Discharge Medication List      START taking these medications    Details   oxyCODONE IR (Roxicodone) 5 mg immediate release tablet Take 1 Tablet by mouth every four (4) hours as needed for Pain for up to 3 days. Max Daily Amount: 30 mg.  Qty: 15 Tablet, Refills: 0  Start date: 5/18/2022, End date: 5/21/2022    Associated Diagnoses: Acute gout of right ankle, unspecified cause; Acute right ankle pain      predniSONE (STERAPRED DS) 10 mg dose pack Take as directed  Qty: 48 Tablet, Refills: 0  Start date: 5/18/2022      colchicine 0.6 mg tablet Take 1 Tablet by mouth daily. Qty: 20 Tablet, Refills: 0  Start date: 5/18/2022           2. Follow-up Information     Follow up With Specialties Details Why Contact Info    Mirta Robles MD Family Medicine  As needed [address]  [phone]          3. Return to ED if worse     Diagnosis     Clinical Impression:   1. Acute gout of right ankle, unspecified cause    2. Acute right ankle pain        Attestations:    Irais Armas, DO    Please note that this dictation was completed with [employer], the [employer] voice recognition software. Quite often unanticipated grammatical, syntax, homophones, and other interpretive errors are inadvertently transcribed by the computer software.   Please disregard these errors. Please excuse any errors that have escaped final proofreading. Thank you.

## 2022-05-18 NOTE — Clinical Note
[address]  [facility] EMERGENCY DEPT  [address]  [place] [address]  [phone]    Work/School Note    Date: 5/18/2022    To Whom It May concern:      Eusebio Umanzor was seen and treated today in the emergency room by the following provider(s):  Attending Provider: Naa Aervalo DO  Physician Assistant: Shaquille Jimenez. Eusebio Umanzor is excused from work/school on 05/18/22. He is clear to return to work/school on 05/19/22.         Sincerely,          Van Childs DO

## 2022-05-18 NOTE — Clinical Note
[address]  [facility] EMERGENCY DEPT  [address] [address]  [phone]    Work/School Note    Date: 5/18/2022    To Whom It May concern:    Nuha Solares was seen and treated today in the emergency room by the following provider(s):  Attending Provider: Krishna Reno DO  Physician Assistant: Shaquille Siddiqi. Nuha Solares is excused from work/school on 05/18/22 and 05/19/22. He is medically clear to return to work/school on 5/20/2022.        Sincerely,          Brittani Gonzalez DO

## 2022-06-27 RX ORDER — ATENOLOL 25 MG/1
TABLET ORAL
Qty: 90 TABLET | Refills: 0 | Status: SHIPPED | OUTPATIENT
Start: 2022-06-27 | End: 2022-09-12

## 2022-08-12 ENCOUNTER — TELEPHONE (OUTPATIENT)
Dept: FAMILY MEDICINE CLINIC | Age: 52
End: 2022-08-12

## 2022-08-12 NOTE — TELEPHONE ENCOUNTER
----- Message from [facility] sent at 8/12/2022  2:30 PM EDT -----  Subject: Referral Request    Reason for referral request? Prostate and colonoscopy   Provider patient wants to be referred to(if known):     Provider Phone Number(if known): Additional Information for Provider? Patient is needing routine checks   done and will be needing referrals for some of them.  Please call to   discuss in detail with patient  ---------------------------------------------------------------------------  --------------  [id number]    [phone]; OK to leave message on voicemail  ---------------------------------------------------------------------------  --------------

## 2022-08-13 ENCOUNTER — HOSPITAL ENCOUNTER (EMERGENCY)
Age: 52
Discharge: HOME | End: 2022-08-13
Attending: STUDENT IN AN ORGANIZED HEALTH CARE EDUCATION/TRAINING PROGRAM
Payer: MEDICAID

## 2022-08-13 VITALS
WEIGHT: 236.77 LBS | BODY MASS INDEX: 29.44 KG/M2 | RESPIRATION RATE: 16 BRPM | DIASTOLIC BLOOD PRESSURE: 108 MMHG | OXYGEN SATURATION: 96 % | HEIGHT: 75 IN | SYSTOLIC BLOOD PRESSURE: 167 MMHG | TEMPERATURE: 97.4 F | HEART RATE: 72 BPM

## 2022-08-13 DIAGNOSIS — M10.031 ACUTE IDIOPATHIC GOUT OF RIGHT WRIST: Primary | ICD-10-CM

## 2022-08-13 PROCEDURE — 74011250637 HC RX REV CODE- 250/637: Performed by: PHYSICIAN ASSISTANT

## 2022-08-13 PROCEDURE — 99283 EMERGENCY DEPT VISIT LOW MDM: CPT

## 2022-08-13 PROCEDURE — 74011250637 HC RX REV CODE- 250/637: Performed by: STUDENT IN AN ORGANIZED HEALTH CARE EDUCATION/TRAINING PROGRAM

## 2022-08-13 RX ORDER — COLCHICINE 0.6 MG/1
1.2 TABLET ORAL DAILY
Status: DISCONTINUED | OUTPATIENT
Start: 2022-08-13 | End: 2022-08-13 | Stop reason: HOSPADM

## 2022-08-13 RX ORDER — OXYCODONE HYDROCHLORIDE 5 MG/1
5 TABLET ORAL
Status: DISCONTINUED | OUTPATIENT
Start: 2022-08-13 | End: 2022-08-13

## 2022-08-13 RX ORDER — INDOMETHACIN 25 MG/1
25 CAPSULE ORAL 3 TIMES DAILY
Qty: 30 CAPSULE | Refills: 0 | Status: SHIPPED | OUTPATIENT
Start: 2022-08-13 | End: 2022-08-23

## 2022-08-13 RX ORDER — COLCHICINE 0.6 MG/1
0.6 CAPSULE ORAL DAILY
Qty: 30 CAPSULE | Refills: 0 | Status: SHIPPED | OUTPATIENT
Start: 2022-08-13 | End: 2022-09-12

## 2022-08-13 RX ORDER — OXYCODONE AND ACETAMINOPHEN 5; 325 MG/1; MG/1
1 TABLET ORAL
Status: COMPLETED | OUTPATIENT
Start: 2022-08-13 | End: 2022-08-13

## 2022-08-13 RX ADMIN — COLCHICINE 1.2 MG: 0.6 TABLET, FILM COATED ORAL at 10:28

## 2022-08-13 RX ADMIN — OXYCODONE AND ACETAMINOPHEN 1 TABLET: 5; 325 TABLET ORAL at 09:50

## 2022-08-13 NOTE — DISCHARGE INSTRUCTIONS
Please take 1 additional colchicine pill today after you leave the ER, then begin taking twice daily until flare resolves, please follow-up with primary care doctor for reevaluation of your pain and symptoms week.   Please return to ER if you develop any fevers, chills, worsening pain or new symptoms

## 2022-08-13 NOTE — Clinical Note
[address]  [facility] EMERGENCY DEPT  [address] [facility]  [place] [id number] [phone]    Work/School Note    Date: 8/13/2022    To Whom It May concern:    Oc Conrad was seen and treated today in the emergency room by the following provider(s):  Attending Provider: Kelly Naidu MD.      Oc Conrad is excused from work/school on 08/13/22 and 08/14/22. He is medically clear to return to work/school on 8/15/2022.        Sincerely,          Melvin Winston MD

## 2022-08-13 NOTE — ED PROVIDER NOTES
EMERGENCY DEPARTMENT HISTORY AND PHYSICAL EXAM      Date: 8/13/2022  Patient Name: Geovani Gu    History of Presenting Illness     Chief Complaint   Patient presents with    Gout     Right hand and feels it coming on his ankle too. History Provided By: Patient    Geovani Gu, 46 y.o. male     Patient is a [id number]-old male with history of gout, hypertension presenting with concerns of a gout flare in his right wrist, patient states he began feeling tingling last night and had acute onset pain today, feels typical of gout flares for him, patient denies any fevers, chills, nausea, vomiting or other acute symptoms at this time. Patient states he ran out of colchicine and has no indomethacin at home, did not feel he can make it to a primary care doctor it is the weekend. Patient has no additional complaints at this time       There are no other complaints, changes, or physical findings at this time. PCP: Marques Gonzalez MD    No current facility-administered medications on file prior to encounter. Current Outpatient Medications on File Prior to Encounter   Medication Sig Dispense Refill    atenoloL (TENORMIN) 25 mg tablet Take 1 tablet by mouth once daily 90 Tablet 0    predniSONE (STERAPRED DS) 10 mg dose pack Take as directed 48 Tablet 0    [DISCONTINUED] colchicine 0.6 mg tablet Take 1 Tablet by mouth daily. 20 Tablet 0    allopurinoL (ZYLOPRIM) 100 mg tablet Take 1 Tablet by mouth daily. 60 Tablet 3    chlorthalidone (HYGROTON) 25 mg tablet Take 1 Tablet by mouth daily. For blood pressure 90 Tablet 3    [DISCONTINUED] indomethacin (INDOCIN) 25 mg capsule TAKE 1 CAPSULE BY MOUTH THREE TIMES DAILY AS NEEDED 90 Capsule 1    atorvastatin (LIPITOR) 20 mg tablet Take 1 Tab by mouth daily. For cholesterol 90 Tab 3    lisinopriL (PRINIVIL, ZESTRIL) 10 mg tablet Take 1 Tab by mouth daily. 90 Tab 3    naproxen (NAPROSYN) 500 mg tablet Take 1 Tab by mouth two (2) times daily (with meals).  For neck pain 60 Tab 3    acetaminophen (TYLENOL) 500 mg tablet Take 2 Tabs by mouth every twelve (12) hours as needed for Pain. Take 2 tabs with one naproxen for severe pain 60 Tab 1    cetirizine (ZYRTEC) 10 mg tablet Take 1 Tab by mouth daily. 20 Tab 0       Past History     Past Medical History:  Past Medical History:   Diagnosis Date    Gout     H/O seasonal allergies     Hypertension     Other ill-defined conditions(799.89)     gout       Past Surgical History:  Past Surgical History:   Procedure Laterality Date    HX HEENT      eyes    HX ORTHOPAEDIC      right knee petella tendon repair    HX RETINAL DETACHMENT REPAIR         Family History:  Family History   Problem Relation Age of Onset    Diabetes Neg Hx        Social History:  Social History     Tobacco Use    Smoking status: Former     Types: Cigarettes    Smokeless tobacco: Never   Vaping Use    Vaping Use: Never used   Substance Use Topics    Alcohol use: Yes     Alcohol/week: 14.0 standard drinks     Types: 14 Cans of beer per week     Comment: \"6pack beer day\"    Drug use: No       Allergies: Allergies   Allergen Reactions    Tramadol Nausea and Vomiting         Review of Systems   Review of Systems   Constitutional:  Negative for activity change, chills, fatigue and fever. Respiratory:  Negative for cough. Cardiovascular:  Negative for leg swelling. Gastrointestinal:  Negative for diarrhea, nausea and vomiting. Genitourinary:  Negative for decreased urine volume. Musculoskeletal:  Positive for arthralgias. Negative for myalgias. Skin:  Positive for color change. Negative for rash. Allergic/Immunologic: Negative for immunocompromised state. Neurological:  Negative for headaches. Hematological:  Does not bruise/bleed easily. Physical Exam   Physical Exam  Vitals reviewed. Constitutional:       General: He is not in acute distress. Appearance: He is not ill-appearing, toxic-appearing or diaphoretic.    HENT:      Head: Normocephalic and atraumatic. Mouth/Throat:      Mouth: Mucous membranes are moist.   Cardiovascular:      Rate and Rhythm: Normal rate. Pulmonary:      Effort: Pulmonary effort is normal. No tachypnea, accessory muscle usage or respiratory distress. Abdominal:      Palpations: Abdomen is soft. Musculoskeletal:      Cervical back: Neck supple. Right lower leg: No edema. Left lower leg: No edema. Skin:     General: Skin is warm and dry. Comments: Mild erythema and warmth over right first metacarsal, limited range of motion of first metacarpal   Neurological:      General: No focal deficit present. Mental Status: He is alert. Psychiatric:         Mood and Affect: Mood normal.       Diagnostic Study Results     Labs -   No results found for this or any previous visit (from the past 24 hour(s)). Radiologic Studies -   No orders to display     CT Results  (Last 48 hours)      None          CXR Results  (Last 48 hours)      None              Medical Decision Making   I am the first provider for this patient. I reviewed the vital signs, available nursing notes, past medical history, past surgical history, family history and social history. Vital Signs-Reviewed the patient's vital signs. Patient Vitals for the past 12 hrs:   Temp Pulse Resp BP SpO2   08/13/22 0916 97.4 °F (36.3 °C) 72 16 (!) 167/108 96 %       Records Reviewed: Nursing records and medical records reviewed    Ddx: Gout, wrist pain, wrist injury    Initial assessment performed. The patients presenting problems have been discussed, and they are in agreement with the care plan formulated and outlined with them. I have encouraged them to ask questions as they arise throughout their visit.     MDM  Number of Diagnoses or Management Options  Acute idiopathic gout of right wrist  Diagnosis management comments: Patient is a 43-year-old male with history of gout flare, has no medication at home for this, began feeling symptoms last night with acute onset this morning, patient has exquisite tenderness over right first meta carpal, admitted range of motion, consistent with old flares, will concern for infection at this time given consistency with prior areas, will provide dose of opioid medication while in ED as he did not drive here today, will provide colchicine as well as prescription and refill indomethacin, instructed him to follow-up with primary care states he will do so. Medications Administered       oxyCODONE-acetaminophen (PERCOCET) 5-325 mg per tablet 1 Tablet       Admin Date  08/13/2022 Action  Given Dose  1 Tablet Route  Oral Administered By  Melany Rogers                    Procedures        Disposition: Discharge Note:  10:31 AM  The patient has been re-evaluated and is ready for discharge. Reviewed available results with patient. Counseled patient on diagnosis and care plan. Patient has expressed understanding, and all questions have been answered. Patient agrees with plan and agrees to follow up as recommended, or to return to the ED if their symptoms worsen. Discharge instructions have been provided and explained to the patient, along with reasons to return to the ED. DISCHARGE PLAN:  1. Current Discharge Medication List        START taking these medications    Details   colchicine (MITIGARE) 0.6 mg capsule Take 1 Capsule by mouth in the morning for 30 days. Please take one additional pill today after ED discharge then twice daily until flare resolves, then discontinue  Qty: 30 Capsule, Refills: 0  Start date: 8/13/2022, End date: 9/12/2022           CONTINUE these medications which have CHANGED    Details   indomethacin (INDOCIN) 25 mg capsule Take 1 Capsule by mouth three (3) times daily for 10 days.  With food  Qty: 30 Capsule, Refills: 0  Start date: 8/13/2022, End date: 8/23/2022           STOP taking these medications       colchicine 0.6 mg tablet Comments:   Reason for Stopping: 2.   Follow-up Information       Follow up With Specialties Details Why Contact Info    Gretchen Blum MD [facility] Schedule an appointment as soon as possible for a visit in 3 days  [address]  [phone]      [facility] EMERGENCY DEPT Emergency Medicine  If symptoms worsen [address]  [address] [facility]  [phone]          3. Return to ED if worse     Diagnosis     Clinical Impression:   1. Acute idiopathic gout of right wrist        Attestations:    Ron Hays MD    Please note that this dictation was completed with [employer] ([employer] the [employer] voice recognition software. Quite often unanticipated grammatical, syntax, homophones, and other interpretive errors are inadvertently transcribed by the computer software. Please disregard these errors. Please excuse any errors that have escaped final proofreading. Thank you.

## 2022-08-14 RX ORDER — INDOMETHACIN 25 MG/1
CAPSULE ORAL
Qty: 30 CAPSULE | Refills: 0 | Status: SHIPPED | OUTPATIENT
Start: 2022-08-14

## 2022-08-17 ENCOUNTER — HOSPITAL ENCOUNTER (EMERGENCY)
Age: 52
Discharge: HOME | End: 2022-08-17
Attending: EMERGENCY MEDICINE
Payer: MEDICAID

## 2022-08-17 ENCOUNTER — APPOINTMENT (OUTPATIENT)
Dept: GENERAL RADIOLOGY | Age: 52
End: 2022-08-17
Attending: EMERGENCY MEDICINE
Payer: MEDICAID

## 2022-08-17 VITALS
OXYGEN SATURATION: 93 % | DIASTOLIC BLOOD PRESSURE: 105 MMHG | RESPIRATION RATE: 18 BRPM | HEART RATE: 74 BPM | SYSTOLIC BLOOD PRESSURE: 151 MMHG

## 2022-08-17 DIAGNOSIS — M10.9 ACUTE GOUT OF RIGHT HAND, UNSPECIFIED CAUSE: Primary | ICD-10-CM

## 2022-08-17 LAB
BASE DEFICIT BLD-SCNC: 0.8 MMOL/L
CA-I BLD-MCNC: 1.12 MMOL/L (ref 1.12–1.32)
CHLORIDE BLD-SCNC: 114 MMOL/L (ref 100–108)
CO2 BLD-SCNC: 24 MMOL/L (ref 19–24)
CREAT UR-MCNC: 1.2 MG/DL (ref 0.6–1.3)
GLUCOSE BLD STRIP.AUTO-MCNC: 88 MG/DL (ref 74–106)
HCO3 BLDA-SCNC: 24 MMOL/L
LACTATE BLD-SCNC: 0.91 MMOL/L (ref 0.4–2)
PCO2 BLDV: 39.3 MMHG (ref 41–51)
PH BLDV: 7.39 [PH] (ref 7.32–7.42)
PO2 BLDV: 45 MMHG (ref 25–40)
POTASSIUM BLD-SCNC: 3.9 MMOL/L (ref 3.5–5.5)
SODIUM BLD-SCNC: 146 MMOL/L (ref 136–145)
SPECIMEN SITE: ABNORMAL

## 2022-08-17 PROCEDURE — 73130 X-RAY EXAM OF HAND: CPT

## 2022-08-17 PROCEDURE — 99283 EMERGENCY DEPT VISIT LOW MDM: CPT

## 2022-08-17 PROCEDURE — 82947 ASSAY GLUCOSE BLOOD QUANT: CPT

## 2022-08-17 PROCEDURE — 74011250637 HC RX REV CODE- 250/637: Performed by: EMERGENCY MEDICINE

## 2022-08-17 RX ORDER — OXYCODONE AND ACETAMINOPHEN 5; 325 MG/1; MG/1
1 TABLET ORAL
Qty: 12 TABLET | Refills: 0 | Status: SHIPPED | OUTPATIENT
Start: 2022-08-17 | End: 2022-08-20

## 2022-08-17 RX ORDER — COLCHICINE 0.6 MG/1
0.6 TABLET ORAL
Status: COMPLETED | OUTPATIENT
Start: 2022-08-17 | End: 2022-08-17

## 2022-08-17 RX ORDER — OXYCODONE AND ACETAMINOPHEN 5; 325 MG/1; MG/1
1 TABLET ORAL ONCE
Status: COMPLETED | OUTPATIENT
Start: 2022-08-17 | End: 2022-08-17

## 2022-08-17 RX ORDER — COLCHICINE 0.6 MG/1
1.2 TABLET ORAL
Status: COMPLETED | OUTPATIENT
Start: 2022-08-17 | End: 2022-08-17

## 2022-08-17 RX ADMIN — COLCHICINE 1.2 MG: 0.6 TABLET, FILM COATED ORAL at 07:34

## 2022-08-17 RX ADMIN — COLCHICINE 0.6 MG: 0.6 TABLET, FILM COATED ORAL at 08:54

## 2022-08-17 RX ADMIN — OXYCODONE AND ACETAMINOPHEN 1 TABLET: 5; 325 TABLET ORAL at 07:34

## 2022-08-17 NOTE — ED NOTES
45 y/o M presents to ED with c/o right arm swelling and pain from gout flareup x 4 days. Reports being seen here 4 days ago for same problem. Prescribed colchicine but patient unable to afford prescription. Swelling was on right 2nd finger joint which has now spread to middle finger. Patient is unable to move index finger but small movements in middle finger. Swelling noted at base of right middle finger. Reporting 10/10 pain.

## 2022-08-17 NOTE — DISCHARGE INSTRUCTIONS
It was a pleasure taking care of you in our Emergency Department today. We know that when you come to [facility], you are entrusting us with your health, comfort, and safety. Our physicians and nurses honor that trust, and truly appreciate the opportunity to care for you and your loved ones. We also value your feedback. If you receive a survey about your Emergency Department experience today, please fill it out. We care about our patients' feedback, and we listen to what you have to say. Please read over your discharge instructions as these contain pertinent information to help you in the healing process. These instructions include a list of prescriptions you were given today. Follow-up information is also noted on your discharge papers. There are attached instructions and information pertaining to the reason why you were seen in the emergency department today. These discharge instructions may not be for exactly why you were here, but may be the closest available instructions that we have. These include important advice for things that you can do at home to feel better, and reasons to return to the emergency department. The evaluation and treatment you received in the emergency department is not always definitive care. If follow-up with your primary care doctor or specialist was recommended, it is important that you make these appointments for follow-up care. You may need further testing, procedures, and/or medications to help you feel better. Further tests may be required that are not available in the emergency department. Failure to make these follow-up appointments may jeopardize your health. The emergency department is here for emergent stabilization and evaluation of life and limb threatening illness and/or injuries.   Further care through a specialist or primary care doctor may be required to assist in your healing and complete your treatment and/or evaluation. We may not always be able to make a diagnosis in the emergency department, or things may change that will alter your diagnosis. Our primary goal is to ensure that nothing serious is occurring and that you are stable to continue your treatment and evaluation at home as an outpatient. Of course, if things change, and you feel worse, you are always encouraged to return to the emergency department for re-evaluation. Lab Results Today:  Recent Results (from the past 8 hour(s))   BLOOD GAS,CHEM8,LACTIC ACID POC    Collection Time: 08/17/22  8:01 AM   Result Value Ref Range    Calcium, ionized (POC) 1.12 1.12 - 1.32 mmol/L    BICARBONATE 24 mmol/L    Base deficit (POC) 0.8 mmol/L    Sample source VENOUS BLOOD      CO2, POC 24 19 - 24 MMOL/L    Sodium,  (H) 136 - 145 MMOL/L    Potassium, POC 3.9 3.5 - 5.5 MMOL/L    Chloride,  (H) 100 - 108 MMOL/L    Glucose, POC 88 74 - 106 MG/DL    Creatinine, POC 1.2 0.6 - 1.3 MG/DL    Lactic Acid (POC) 0.91 0.40 - 2.00 mmol/L    pH, venous (POC) 7.39 7.32 - 7.42      pCO2, venous (POC) 39.3 (L) 41 - 51 MMHG    pO2, venous (POC) 45 (H) 25 - 40 mmHg        Radiology Results Today:  XR HAND RT MIN 3 V    Result Date: 8/17/2022  No acute bone abnormality.

## 2022-08-17 NOTE — ED PROVIDER NOTES
EMERGENCY DEPARTMENT HISTORY AND PHYSICAL EXAM      Date: 8/17/2022  Patient Name: Leonidas Keenan    History of Presenting Illness     Chief Complaint   Patient presents with    Wrist Pain       History Provided By: Patient    HPI: Leonidas Keenan, 46 y.o. male  presents to the ED with cc of right hand pain. Patient has a history of gout and has had gout in his hand before. He was seen 4 days ago for same complaint. Patient states this is the worst flare that he is ever had. He was not able to forward the colchicine that he was prescribed at his last visit. He denies fevers or chills. He states he does take allopurinol at home as well. Oxycodone was prescribed for pain and has been helping some. Past History     Past Medical History:  Past Medical History:   Diagnosis Date    Gout     H/O seasonal allergies     Hypertension     Other ill-defined conditions(799.89)     gout       Past Surgical History:  Past Surgical History:   Procedure Laterality Date    HX HEENT      eyes    HX ORTHOPAEDIC      right knee petella tendon repair    HX RETINAL DETACHMENT REPAIR         Medications:  No current facility-administered medications on file prior to encounter. Current Outpatient Medications on File Prior to Encounter   Medication Sig Dispense Refill    indomethacin (INDOCIN) 25 mg capsule Take 1 capsule by mouth three times daily as needed 30 Capsule 0    colchicine (MITIGARE) 0.6 mg capsule Take 1 Capsule by mouth in the morning for 30 days. Please take one additional pill today after ED discharge then twice daily until flare resolves, then discontinue 30 Capsule 0    indomethacin (INDOCIN) 25 mg capsule Take 1 Capsule by mouth three (3) times daily for 10 days. With food 30 Capsule 0    atenoloL (TENORMIN) 25 mg tablet Take 1 tablet by mouth once daily 90 Tablet 0    predniSONE (STERAPRED DS) 10 mg dose pack Take as directed 48 Tablet 0    allopurinoL (ZYLOPRIM) 100 mg tablet Take 1 Tablet by mouth daily. 60 Tablet 3    chlorthalidone (HYGROTON) 25 mg tablet Take 1 Tablet by mouth daily. For blood pressure 90 Tablet 3    atorvastatin (LIPITOR) 20 mg tablet Take 1 Tab by mouth daily. For cholesterol 90 Tab 3    lisinopriL (PRINIVIL, ZESTRIL) 10 mg tablet Take 1 Tab by mouth daily. 90 Tab 3    naproxen (NAPROSYN) 500 mg tablet Take 1 Tab by mouth two (2) times daily (with meals). For neck pain 60 Tab 3    acetaminophen (TYLENOL) 500 mg tablet Take 2 Tabs by mouth every twelve (12) hours as needed for Pain. Take 2 tabs with one naproxen for severe pain 60 Tab 1    cetirizine (ZYRTEC) 10 mg tablet Take 1 Tab by mouth daily. 21 Tab 0       Family History:  Family History   Problem Relation Age of Onset    Diabetes Neg Hx        Social History:  Social History     Tobacco Use    Smoking status: Former     Types: Cigarettes    Smokeless tobacco: Never   Vaping Use    Vaping Use: Never used   Substance Use Topics    Alcohol use: Yes     Alcohol/week: 14.0 standard drinks     Types: 14 Cans of beer per week     Comment: \"6pack beer day\"    Drug use: No       Allergies: Allergies   Allergen Reactions    Tramadol Nausea and Vomiting       All the above components of the past  history are auto-populated from the electronic record. They have been reviewed and the patient has been interviewed for any pertinent past history that pertains to the patient's chief complaint and reason for visit. Not all pre-populated components may be accurate at the time this note was generated. Review of Systems   Review of Systems   Constitutional:  Negative for chills and fever. HENT:  Negative for congestion, ear pain, rhinorrhea, sore throat and trouble swallowing. Eyes:  Negative for visual disturbance. Respiratory:  Negative for cough, chest tightness and shortness of breath. Cardiovascular:  Negative for chest pain and palpitations.    Gastrointestinal:  Negative for abdominal pain, blood in stool, constipation, diarrhea, nausea and vomiting. Genitourinary:  Negative for decreased urine volume, difficulty urinating, dysuria and frequency. Musculoskeletal:  Negative for back pain and neck pain. Right hand pain and swelling   Skin:  Negative for color change and rash. Neurological:  Negative for dizziness, weakness, light-headedness and headaches. Physical Exam   Physical Exam  Vitals and nursing note reviewed. Constitutional:       General: He is not in acute distress. Appearance: He is well-developed. He is not ill-appearing. HENT:      Head: Normocephalic. Eyes:      Conjunctiva/sclera: Conjunctivae normal.   Cardiovascular:      Rate and Rhythm: Normal rate and regular rhythm. Pulmonary:      Effort: Pulmonary effort is normal. No accessory muscle usage or respiratory distress. Abdominal:      General: There is no distension. Musculoskeletal:      Right hand: Swelling and tenderness present. No deformity. Decreased range of motion. Normal pulse. Cervical back: Normal range of motion. Skin:     General: Skin is warm and dry. Neurological:      Mental Status: He is alert and oriented to person, place, and time.        Diagnostic Study Results     Labs -     Recent Results (from the past 24 hour(s))   BLOOD GAS,CHEM8,LACTIC ACID POC    Collection Time: 08/17/22  8:01 AM   Result Value Ref Range    Calcium, ionized (POC) 1.12 1.12 - 1.32 mmol/L    BICARBONATE 24 mmol/L    Base deficit (POC) 0.8 mmol/L    Sample source VENOUS BLOOD      CO2, POC 24 19 - 24 MMOL/L    Sodium,  (H) 136 - 145 MMOL/L    Potassium, POC 3.9 3.5 - 5.5 MMOL/L    Chloride,  (H) 100 - 108 MMOL/L    Glucose, POC 88 74 - 106 MG/DL    Creatinine, POC 1.2 0.6 - 1.3 MG/DL    Lactic Acid (POC) 0.91 0.40 - 2.00 mmol/L    pH, venous (POC) 7.39 7.32 - 7.42      pCO2, venous (POC) 39.3 (L) 41 - 51 MMHG    pO2, venous (POC) 45 (H) 25 - 40 mmHg       Radiologic Studies -   XR HAND RT MIN 3 V   Final Result   No acute bone abnormality. CT Results  (Last 48 hours)      None          CXR Results  (Last 48 hours)      None              Medical Decision Making     I reviewed the vital signs, available nursing notes, past medical history, past surgical history, family history and social history. Vital Signs-I have reviewed the vital signs that have been made available during the patient's emergency department visit. The vital signs auto-populated below are obtained mostly by electronic means through monitoring devices that have been downloaded into the patient's chart by the nursing staff. Some vital signs are not downloaded into the chart until after the patient has been discharged and this note has been completed, therefore some vital signs may not be available to the physician for review prior to patient's discharge or admission. The physician has reviewed the patient's triage vital signs, monitored the electronic monitoring devices remotely for any significant vital sign abnormalities, and have reviewed vital signs prior to discharge. Some vital signs reviewed at bedside or remotely utilizing electronic monitoring devices may be different than the vital signs downloaded into the electronic medical record. Some vital signs may be erroneous and inaccurate since they are obtained by electronic monitoring devices, and not all vital signs are verified for accuracy by nursing staff prior to downloading into the patient's chart. Patient Vitals for the past 24 hrs:   Pulse Resp BP SpO2   08/17/22 0715 -- -- (!) 151/105 93 %   08/17/22 0700 -- -- (!) 134/99 97 %   08/17/22 0615 74 18 (!) 164/102 97 %         Records Reviewed: Nursing notes for today's visit have been reviewed. I have also reviewed most recent medical records pertinent to today's complaints, if available in our medical record system. I have also reviewed all labs and imaging results from previous results in comparison to results obtained today.   If an EKG was obtained today, it has been compared to previous EKGs, if available. If arriving via EMS, the EMS report has been reviewed if made available to us within the patient's time in the emergency department. ED Course and Medical Decision Making:   Patient presents with acute gouty arthritis in the right hand. Patient cannot afford colchicine. We will give him 2 dose therapy here in the emergency department. His kidney function looks good. Will prescribe home opioids for pain control at home. He is on indomethacin. I discussed with him that medications such as NSAIDs, colchicine, steroids have not been proven to always work with gout. May need to just try different things. May need just some time and rest and the opioids will help him tolerate the pain. Follow-up with primary care who may refer to rheumatology if needed. ED Course as of 08/17/22 0846   Wed Aug 17, 2022   0840 BLOOD GAS,CHEM8,LACTIC ACID POC(!):    Calcium, ionized (POC) 1.12   BICARBONATE 24   Base deficit (POC) 0.8   Sample source VENOUS BLOOD   CO2, POC 24   Sodium (POC) 146(!)   Potassium (POC) 3.9   Chloride, (!)   GLUCOSE,FAST - POC 88   Creatinine, POC 1.2   Lactic Acid (POC) 0.91   pH, venous (POC) 7.39   pCO2, venous (POC) 39.3(!)   pO2, venous (POC) 45(!)  No abnormalities in kidney function or electrolytes. No ALFRED. [WM]   0840 XR HAND RT MIN 3 V  No acute abnormalities or bony destruction noted on x-ray. [WM]      ED Course User Index  [WM] Anderson Givens MD       Orders Placed This Encounter    XR HAND RT MIN 3 V    POC CHEM8    BLOOD GAS,CHEM8,LACTIC ACID POC    oxyCODONE-acetaminophen (PERCOCET) 5-325 mg per tablet 1 Tablet    colchicine tablet 1.2 mg    colchicine tablet 0.6 mg    oxyCODONE-acetaminophen (Percocet) 5-325 mg per tablet       Procedures      Critical Care Time:   0    Disposition:  Discharge    The patient's emergency department evaluation is now complete.   I have reviewed all labs, imaging, and pertinent information. I have discussed all results with the patient and/or family. Based on our evaluation today I do believe that the patient is safe to be discharged home. The patient has been provided with at home instructions that are pertinent to their complaint today, although these may not be specific to the exact diagnosis. I have reviewed the patient's home medications and attempted to reconcile if not already done so by pharmacy or nursing staff. I have discussed all new prescriptions with the patient. The patient has been encouraged to follow-up with primary care doctor and/or specialist, and these have been discussed with the patient. The patient has been advised that they may return to the emergency department if they have any worsening symptoms and or new symptoms that are of concern to them. Verbal discharge instructions may have also been provided to the patient that may not be specifically contained in the written discharge instructions. The patient has been given opportunity to ask questions prior to discharge. PLAN:  1. Current Discharge Medication List        START taking these medications    Details   oxyCODONE-acetaminophen (Percocet) 5-325 mg per tablet Take 1 Tablet by mouth every six (6) hours as needed for Pain for up to 3 days. Max Daily Amount: 4 Tablets. Qty: 12 Tablet, Refills: 0  Start date: 8/17/2022, End date: 8/20/2022    Associated Diagnoses: Acute gout of right hand, unspecified cause           2. Follow-up Information       Follow up With Specialties Details Why Contact Info    Anni Arthur MD [facility] Schedule an appointment as soon as possible for a visit   [address] [place] [id number] [phone]            Return to ED if worse     Diagnosis     Clinical Impression:   1.  Acute gout of right hand, unspecified cause

## 2022-09-12 RX ORDER — ATENOLOL 25 MG/1
TABLET ORAL
Qty: 90 TABLET | Refills: 0 | Status: SHIPPED | OUTPATIENT
Start: 2022-09-12 | End: 2022-10-30

## 2022-10-30 RX ORDER — ATENOLOL 25 MG/1
TABLET ORAL
Qty: 90 TABLET | Refills: 0 | Status: SHIPPED | OUTPATIENT
Start: 2022-10-30

## 2022-10-30 RX ORDER — CHLORTHALIDONE 25 MG/1
TABLET ORAL
Qty: 30 TABLET | Refills: 0 | Status: SHIPPED | OUTPATIENT
Start: 2022-10-30

## 2022-12-02 ENCOUNTER — TELEPHONE (OUTPATIENT)
Dept: FAMILY MEDICINE CLINIC | Age: 52
End: 2022-12-02

## 2022-12-02 RX ORDER — CHLORTHALIDONE 25 MG/1
TABLET ORAL
Qty: 30 TABLET | Refills: 0 | Status: SHIPPED | OUTPATIENT
Start: 2022-12-02

## 2022-12-02 NOTE — TELEPHONE ENCOUNTER
Patient is requesting a RX        atenoloL (TENORMIN) 25 mg tablet             colchicine tablet 0.6 mg       indomethacin (INDOCIN) 25 mg capsule     He also need a RX for cough medicine and  albuterol (PROVENTIL HFA, VENTOLIN HFA, PROAIR HFA) 90 mcg/actuation inhaler he can be reached @ [phone]

## 2022-12-02 NOTE — TELEPHONE ENCOUNTER
----- Message from Vladimir Chen sent at 12/1/2022 11:02 AM EST -----  Subject: Message to Provider    QUESTIONS  Information for Provider? Patient requesting an appointment for a   physical. A message was sent with this request on 11/18/22 and the patient   states he has not heard back. Please call the patient back with   availability.  ---------------------------------------------------------------------------  --------------  [address] [facility]  [phone]; OK to leave message on voicemail  ---------------------------------------------------------------------------  --------------  SCRIPT ANSWERS  Relationship to Patient?  Self

## 2022-12-07 RX ORDER — INDOMETHACIN 25 MG/1
CAPSULE ORAL
Qty: 30 CAPSULE | Refills: 0 | Status: SHIPPED | OUTPATIENT
Start: 2022-12-07

## 2022-12-07 RX ORDER — ATENOLOL 25 MG/1
25 TABLET ORAL DAILY
Qty: 90 TABLET | Refills: 0 | Status: SHIPPED | OUTPATIENT
Start: 2022-12-07

## 2022-12-07 NOTE — TELEPHONE ENCOUNTER
Patient notified he was given an appointment for 2/8/23 on 12/1/22. Patient wants physical and blood work. Harriet Sheth

## 2022-12-07 NOTE — TELEPHONE ENCOUNTER
Meds atenolol and indomethacin sent to pcp for review. Colchicine, and inhalers are not on patient med list and therefore considered new meds and will require an office visit. Patient notified in separate encounter of cpe on 2/8/23. Patient stated that's why I wanted an appt to get these meds. Last office visit was 1/28/21 nearly two years ago. Jake Ayala atenolol and indomethacin sent to pcp for review.

## 2022-12-13 ENCOUNTER — DOCUMENTATION ONLY (OUTPATIENT)
Dept: FAMILY MEDICINE CLINIC | Age: 52
End: 2022-12-13

## 2022-12-20 ENCOUNTER — HOSPITAL ENCOUNTER (EMERGENCY)
Age: 52
Discharge: HOME | End: 2022-12-20
Attending: EMERGENCY MEDICINE
Payer: COMMERCIAL

## 2022-12-20 VITALS
WEIGHT: 239.2 LBS | OXYGEN SATURATION: 99 % | BODY MASS INDEX: 29.74 KG/M2 | HEIGHT: 75 IN | HEART RATE: 65 BPM | TEMPERATURE: 97.5 F | SYSTOLIC BLOOD PRESSURE: 127 MMHG | RESPIRATION RATE: 16 BRPM | DIASTOLIC BLOOD PRESSURE: 102 MMHG

## 2022-12-20 DIAGNOSIS — I10 HYPERTENSION, UNSPECIFIED TYPE: ICD-10-CM

## 2022-12-20 DIAGNOSIS — M10.9 ACUTE GOUT, UNSPECIFIED CAUSE, UNSPECIFIED SITE: Primary | ICD-10-CM

## 2022-12-20 DIAGNOSIS — Z87.39 HISTORY OF GOUT: ICD-10-CM

## 2022-12-20 PROCEDURE — 99283 EMERGENCY DEPT VISIT LOW MDM: CPT

## 2022-12-20 RX ORDER — COLCHICINE 0.6 MG/1
0.6 TABLET ORAL DAILY
Qty: 20 TABLET | Refills: 0 | Status: SHIPPED | OUTPATIENT
Start: 2022-12-20

## 2022-12-20 RX ORDER — OXYCODONE HYDROCHLORIDE 5 MG/1
5 TABLET ORAL
Qty: 7 TABLET | Refills: 0 | Status: SHIPPED | OUTPATIENT
Start: 2022-12-20 | End: 2022-12-23

## 2022-12-20 NOTE — ED PROVIDER NOTES
EMERGENCY DEPARTMENT HISTORY AND PHYSICAL EXAM      Date: 12/20/2022  Patient Name: Terri Kussmaul    History of Presenting Illness     Chief Complaint   Patient presents with    Gout     Left elbow, left toe, left ankle, and right knee. He has been taking the Indomethacin two weeks ago. History Provided By: Patient    HPI: Terri Kussmaul, 46 y.o. male with PMHx gout and HTN, per patient and record review, presents  to the ED with cc of left elbow, left ankle, left toe and right knee pain for the past few days. States \"I have had this so long, I know when it is starting to flare up. \" States symptoms are characteristic of prior gout flares. Endorses aching pain, localized, does not radiate. Pain is worse with touch and weightbearing. Denies trauma or injuries. Denies numbness, weakness or tingling. States he is on allopurinol from PCP to prevent flares. States he was put back on indomethacin, but states he is had better relief with colchicine in the past. Denies fevers, HA, CP, SOB, abdominal pain, changes in bowel or bladder habits. No additional exacerbating or alleviating factors. No other complaints at this time. There are no other complaints, changes, or physical findings at this time. PCP: Tavia Diaz MD    Current Outpatient Medications   Medication Sig Dispense Refill    oxyCODONE IR (Roxicodone) 5 mg immediate release tablet Take 1 Tablet by mouth every six (6) hours as needed for Pain for up to 3 days. Max Daily Amount: 20 mg. 7 Tablet 0    colchicine 0.6 mg tablet Take 1 Tablet by mouth daily. 20 Tablet 0    atenoloL (TENORMIN) 25 mg tablet Take 1 Tablet by mouth daily.  90 Tablet 0    indomethacin (INDOCIN) 25 mg capsule Take one capsule by mouth three times daily as needed 30 Capsule 0    chlorthalidone (HYGROTON) 25 mg tablet Take 1 tablet by mouth once daily for blood pressure 30 Tablet 0    predniSONE (STERAPRED DS) 10 mg dose pack Take as directed 48 Tablet 0    allopurinoL (ZYLOPRIM) 100 mg tablet Take 1 Tablet by mouth daily. 60 Tablet 3    atorvastatin (LIPITOR) 20 mg tablet Take 1 Tab by mouth daily. For cholesterol 90 Tab 3    lisinopriL (PRINIVIL, ZESTRIL) 10 mg tablet Take 1 Tab by mouth daily. 90 Tab 3    naproxen (NAPROSYN) 500 mg tablet Take 1 Tab by mouth two (2) times daily (with meals). For neck pain 60 Tab 3    acetaminophen (TYLENOL) 500 mg tablet Take 2 Tabs by mouth every twelve (12) hours as needed for Pain. Take 2 tabs with one naproxen for severe pain 60 Tab 1    cetirizine (ZYRTEC) 10 mg tablet Take 1 Tab by mouth daily. 20 Tab 0     Past History     Past Medical History:  Past Medical History:   Diagnosis Date    Gout     H/O seasonal allergies     Hypertension     Other ill-defined conditions(799.89)     gout       Past Surgical History:  Past Surgical History:   Procedure Laterality Date    HX HEENT      eyes    HX ORTHOPAEDIC      right knee petella tendon repair    HX RETINAL DETACHMENT REPAIR         Family History:  Family History   Problem Relation Age of Onset    Diabetes Neg Hx        Social History:  Social History     Tobacco Use    Smoking status: Former     Types: Cigarettes    Smokeless tobacco: Never   Vaping Use    Vaping Use: Never used   Substance Use Topics    Alcohol use: Yes     Alcohol/week: 14.0 standard drinks     Types: 14 Cans of beer per week     Comment: \"6pack beer day\"    Drug use: No       Allergies: Allergies   Allergen Reactions    Tramadol Nausea and Vomiting     Review of Systems   Review of Systems   Constitutional:  Negative for appetite change, chills and fever. HENT:  Negative for congestion. Eyes:  Negative for pain. Respiratory:  Negative for cough and shortness of breath. Cardiovascular:  Negative for chest pain. Gastrointestinal:  Negative for abdominal pain, constipation, diarrhea, nausea and vomiting. Genitourinary:  Negative for difficulty urinating, dysuria and frequency.    Musculoskeletal:  Positive for arthralgias, gait problem and joint swelling. Negative for neck pain and neck stiffness. Skin:  Negative for rash. Neurological:  Negative for syncope and headaches. All other systems reviewed and are negative. Physical Exam   Physical Exam  Vitals and nursing note reviewed. Constitutional:       General: He is not in acute distress. Appearance: Normal appearance. He is not ill-appearing or toxic-appearing. Comments: 46 y.o. male   HENT:      Head: Normocephalic and atraumatic. Right Ear: External ear normal.      Left Ear: External ear normal.      Nose: Nose normal.   Eyes:      Extraocular Movements: Extraocular movements intact. Conjunctiva/sclera: Conjunctivae normal.   Cardiovascular:      Rate and Rhythm: Normal rate and regular rhythm. Pulses: Normal pulses. Pulmonary:      Effort: Pulmonary effort is normal. No respiratory distress. Abdominal:      General: There is no distension. Musculoskeletal:         General: Tenderness present. No deformity. Normal range of motion. Right hand: Normal strength. Normal sensation. There is no disruption of two-point discrimination. Normal capillary refill. Normal pulse. Left hand: Normal strength. Normal sensation. There is no disruption of two-point discrimination. Normal capillary refill. Normal pulse. Cervical back: Normal range of motion. Right foot: Normal capillary refill. No crepitus. Normal pulse. Left foot: Normal capillary refill. No crepitus. Normal pulse. Comments: Full active ROM throughout all extremities. Strength 5 out of 5 in upper and lower extremities bilaterally. Strong radial pulses bilaterally. Negative Arlyn's sign bilaterally   Strong DP and PT pulses bilaterally. Ambulatory with normal gait. Skin:     General: Skin is warm and dry. Neurological:      General: No focal deficit present. Mental Status: He is alert and oriented to person, place, and time. Psychiatric:         Mood and Affect: Mood normal.         Behavior: Behavior normal.     Diagnostic Study Results     Labs -   No results found for this or any previous visit (from the past 12 hour(s)). Radiologic Studies -   No orders to display     CT Results  (Last 48 hours)      None          CXR Results  (Last 48 hours)      None          Medical Decision Making   I am the first provider for this patient. I reviewed the vital signs, available nursing notes, past medical history, past surgical history, family history and social history. Vital Signs-Reviewed the patient's vital signs. Patient Vitals for the past 12 hrs:   Temp Pulse Resp BP SpO2   12/20/22 1101 -- 65 -- (!) 127/102 --   12/20/22 1025 97.5 °F (36.4 °C) 66 16 (!) 140/114 99 %       Pulse Oximetry Analysis - 99% on RA    Records Reviewed: Nursing Notes, Old Medical Records, and Previous Laboratory Studies    Provider Notes (Medical Decision Making):   Patient is a pleasant and well appearing 45 yo male who presents to the ED for evaluation of joint pain as noted above. Patient states symptoms are characteristic of prior gout flares. Neurovascularly intact, range of motion intact. Reviewed prior labs and imaging. Patient would just like something for pain control and symptom management at this time and politely defers further evaluation and work-up. Low suspicion of septic arthritis, acute fracture, blood clot, deep space infection, neurovascular compromise, or other emergent conditions requiring further evaluation/management acutely here at this time. Shared decision-making form and care plan created together, discussed diagnosis and treatment plan. Given patient has had these medications and done well with them multiple times in the past, feels reasonable to provide short course prescription for this until he is able to follow-up with his PCP and/or orthopedics.  (Prescribed colchicine, advised discontinue indomethacin, and calling his primary care provider to keep them abreast of today's visit medication management). Counseled additional symptomatic management techniques, along with diet and lifestyle modifications. Verbal return precautions advised. Patient verbalizes understanding and agreement of current plan of care. ED Course:   Initial assessment performed. The patients presenting problems have been discussed, and they are in agreement with the care plan formulated and outlined with them. I have encouraged them to ask questions as they arise throughout their visit. Disposition:  Discharge     PLAN:  1. Discharge Medication List as of 12/20/2022 11:40 AM        START taking these medications    Details   oxyCODONE IR (Roxicodone) 5 mg immediate release tablet Take 1 Tablet by mouth every six (6) hours as needed for Pain for up to 3 days. Max Daily Amount: 20 mg., Normal, Disp-7 Tablet, R-0Attending physician Dr. Rita Shaver      colchicine 0.6 mg tablet Take 1 Tablet by mouth daily. , Normal, Disp-20 Tablet, R-0           CONTINUE these medications which have NOT CHANGED    Details   atenoloL (TENORMIN) 25 mg tablet Take 1 Tablet by mouth daily. , Normal, Disp-90 Tablet, R-0No further refills without office visit - 12/7/22      indomethacin (INDOCIN) 25 mg capsule Take one capsule by mouth three times daily as needed, Normal, Disp-30 Capsule, R-0      chlorthalidone (HYGROTON) 25 mg tablet Take 1 tablet by mouth once daily for blood pressure, Normal, Disp-30 Tablet, R-0      predniSONE (STERAPRED DS) 10 mg dose pack Take as directed, Normal, Disp-48 Tablet, R-0      allopurinoL (ZYLOPRIM) 100 mg tablet Take 1 Tablet by mouth daily. , Normal, Disp-60 Tablet, R-3      atorvastatin (LIPITOR) 20 mg tablet Take 1 Tab by mouth daily. For cholesterol, Normal, Disp-90 Tab, R-3      lisinopriL (PRINIVIL, ZESTRIL) 10 mg tablet Take 1 Tab by mouth daily. , Normal, Disp-90 Tab, R-3      naproxen (NAPROSYN) 500 mg tablet Take 1 Tab by mouth two (2) times daily (with meals). For neck pain, Normal, Disp-60 Tab, R-3      acetaminophen (TYLENOL) 500 mg tablet Take 2 Tabs by mouth every twelve (12) hours as needed for Pain. Take 2 tabs with one naproxen for severe pain, Normal, Disp-60 Tab, R-1      cetirizine (ZYRTEC) 10 mg tablet Take 1 Tab by mouth daily. , Print, Disp-20 Tab,R-0           2. Follow-up Information       Follow up With Specialties Details Why Contact Info    [facility] EMERGENCY DEPT Emergency Medicine  As needed, If symptoms worsen [address]    [facility], [address] In 3 days  [address] [facility] [address] [phone]      [facility]  In 3 days  [address] [facility]  [address] [facility] [address]          Return to ED if worse     Diagnosis     Clinical Impression:   1. Acute gout, unspecified cause, unspecified site    2. History of gout    3.  Hypertension, unspecified type

## 2022-12-20 NOTE — DISCHARGE INSTRUCTIONS
Thank You! It was a pleasure taking care of you in our Emergency Department today. We know that when you come to [facility], you are entrusting us with your health, comfort, and safety. Our clinicians honor that trust, and truly appreciate the opportunity to care for you and your loved ones. We also value your feedback. If you receive a survey about your Emergency Department experience today, please fill it out. We care about our patients' feedback, and we listen to what you have to say. Thank you.     Pilar Lay PA-C

## 2023-01-11 RX ORDER — CHLORTHALIDONE 25 MG/1
TABLET ORAL
Qty: 30 TABLET | Refills: 0 | Status: SHIPPED | OUTPATIENT
Start: 2023-01-11

## 2023-02-06 ENCOUNTER — HOSPITAL ENCOUNTER (EMERGENCY)
Age: 53
Discharge: HOME | End: 2023-02-06
Attending: STUDENT IN AN ORGANIZED HEALTH CARE EDUCATION/TRAINING PROGRAM
Payer: COMMERCIAL

## 2023-02-06 VITALS
HEIGHT: 75 IN | WEIGHT: 235.89 LBS | TEMPERATURE: 97.9 F | HEART RATE: 98 BPM | RESPIRATION RATE: 18 BRPM | OXYGEN SATURATION: 99 % | BODY MASS INDEX: 29.33 KG/M2 | DIASTOLIC BLOOD PRESSURE: 99 MMHG | SYSTOLIC BLOOD PRESSURE: 146 MMHG

## 2023-02-06 DIAGNOSIS — M25.511 ACUTE PAIN OF RIGHT SHOULDER: Primary | ICD-10-CM

## 2023-02-06 PROCEDURE — 99283 EMERGENCY DEPT VISIT LOW MDM: CPT

## 2023-02-06 PROCEDURE — 74011250637 HC RX REV CODE- 250/637: Performed by: PHYSICIAN ASSISTANT

## 2023-02-06 RX ORDER — NAPROXEN 250 MG/1
250 TABLET ORAL 2 TIMES DAILY WITH MEALS
Qty: 14 TABLET | Refills: 0 | Status: SHIPPED | OUTPATIENT
Start: 2023-02-06 | End: 2023-02-13

## 2023-02-06 RX ORDER — NAPROXEN 250 MG/1
500 TABLET ORAL ONCE
Status: COMPLETED | OUTPATIENT
Start: 2023-02-06 | End: 2023-02-06

## 2023-02-06 RX ORDER — CYCLOBENZAPRINE HCL 10 MG
10 TABLET ORAL
Qty: 15 TABLET | Refills: 0 | Status: SHIPPED | OUTPATIENT
Start: 2023-02-06 | End: 2023-02-11

## 2023-02-06 RX ADMIN — NAPROXEN 500 MG: 250 TABLET ORAL at 07:19

## 2023-02-06 NOTE — Clinical Note
[address]  [facility] EMERGENCY DEPT  [address] [place] [id number] [phone]    Work/School Note    Date: 2/6/2023    To Whom It May concern:    Monster Bejarano was seen and treated today in the emergency room by the following provider(s):  Attending Provider: Phoebe Mazariegos MD  Physician Assistant: Shaquille Lynne. Monster Bejarano is excused from work/school on 2/6/2023 through 2/8/2023. He is medically clear to return to work/school on 2/9/2023.          Sincerely,          Shaquille Mulligan

## 2023-02-06 NOTE — ED PROVIDER NOTES
[facility] EMERGENCY DEPT  EMERGENCY DEPARTMENT ENCOUNTER       Pt Name: Kavita Mancia  MRN: [id number]  [place] 1970  Date of evaluation: 2/6/2023  Provider: EM Conway   PCP: Abiola Flowers MD  Note Started: 7:03 AM 2/6/23     CHIEF COMPLAINT       Chief Complaint   Patient presents with    Shoulder Pain     Pt to ED c/o Right shoulder pain. Pt states that while working on his vehicle, He felt a sharp pain in his shoulder radiating into his neck and down his arm. HISTORY OF PRESENT ILLNESS: 1 or more elements      History From: Patient  HPI Limitations : None     Kavita Mancia is a 46 y.o. male who presents BIB self with CC of acute onset R shoulder pain. He is a  and reports pulling a deer very abruptly and feeling a sudden sharp pain in the right shoulder. The pain is described as sharp and aching, radiating up towards his neck and down towards his right hand. He also reports a pins-and-needles sensation that comes and goes in the right hand. He denies chest pain, shortness of breath, dizziness, nausea, vomiting. Endorses full range of motion with pain. Nursing Notes were all reviewed and agreed with or any disagreements were addressed in the HPI. REVIEW OF SYSTEMS      Review of Systems   Constitutional:  Negative for diaphoresis and fever. Respiratory:  Negative for shortness of breath. Cardiovascular:  Negative for chest pain. Gastrointestinal:  Negative for nausea and vomiting. Musculoskeletal:  Positive for arthralgias. Neurological:  Negative for dizziness. Positives and Pertinent negatives as per HPI.     PAST HISTORY     Past Medical History:  Past Medical History:   Diagnosis Date    Gout     H/O seasonal allergies     Hypertension     Other ill-defined conditions(799.89)     gout       Past Surgical History:  Past Surgical History:   Procedure Laterality Date    HX HEENT      eyes    HX ORTHOPAEDIC      right knee petella tendon repair HX RETINAL DETACHMENT REPAIR         Family History:  Family History   Problem Relation Age of Onset    Diabetes Neg Hx        Social History:  Social History     Tobacco Use    Smoking status: Former     Types: Cigarettes    Smokeless tobacco: Never   Vaping Use    Vaping Use: Never used   Substance Use Topics    Alcohol use: Yes     Alcohol/week: 14.0 standard drinks     Types: 14 Cans of beer per week     Comment: \"6pack beer day\"    Drug use: No       Allergies: Allergies   Allergen Reactions    Tramadol Nausea and Vomiting       CURRENT MEDICATIONS      Discharge Medication List as of 2/6/2023  7:31 AM        CONTINUE these medications which have NOT CHANGED    Details   chlorthalidone (HYGROTON) 25 mg tablet Take 1 tablet by mouth once daily for blood pressure, Normal, Disp-30 Tablet, R-0      colchicine 0.6 mg tablet Take 1 Tablet by mouth daily. , Normal, Disp-20 Tablet, R-0      atenoloL (TENORMIN) 25 mg tablet Take 1 Tablet by mouth daily. , Normal, Disp-90 Tablet, R-0No further refills without office visit - 12/7/22      indomethacin (INDOCIN) 25 mg capsule Take one capsule by mouth three times daily as needed, Normal, Disp-30 Capsule, R-0      predniSONE (STERAPRED DS) 10 mg dose pack Take as directed, Normal, Disp-48 Tablet, R-0      allopurinoL (ZYLOPRIM) 100 mg tablet Take 1 Tablet by mouth daily. , Normal, Disp-60 Tablet, R-3      atorvastatin (LIPITOR) 20 mg tablet Take 1 Tab by mouth daily. For cholesterol, Normal, Disp-90 Tab, R-3      lisinopriL (PRINIVIL, ZESTRIL) 10 mg tablet Take 1 Tab by mouth daily. , Normal, Disp-90 Tab, R-3      !! naproxen (NAPROSYN) 500 mg tablet Take 1 Tab by mouth two (2) times daily (with meals). For neck pain, Normal, Disp-60 Tab, R-3      acetaminophen (TYLENOL) 500 mg tablet Take 2 Tabs by mouth every twelve (12) hours as needed for Pain.  Take 2 tabs with one naproxen for severe pain, Normal, Disp-60 Tab, R-1      cetirizine (ZYRTEC) 10 mg tablet Take 1 Tab by mouth daily., Print, Disp-20 Tab,R-0       !! - Potential duplicate medications found. Please discuss with provider. PHYSICAL EXAM      ED Triage Vitals [02/06/23 0655]   ED Encounter Vitals Group      BP (!) 146/99      Pulse (Heart Rate) 98      Resp Rate 18      Temp 97.9 °F (36.6 °C)      Temp src       O2 Sat (%) 99 %      Weight 235 lb 14.3 oz      Height 6' 3\"        Physical Exam  Vitals and nursing note reviewed. Constitutional:       Appearance: Normal appearance. HENT:      Head: Normocephalic and atraumatic. Nose: Nose normal.      Mouth/Throat:      Mouth: Mucous membranes are moist.   Eyes:      Extraocular Movements: Extraocular movements intact. Conjunctiva/sclera: Conjunctivae normal.      Pupils: Pupils are equal, round, and reactive to light. Neck:      Comments: No midline TTP. Cardiovascular:      Rate and Rhythm: Normal rate and regular rhythm. Pulmonary:      Effort: Pulmonary effort is normal.      Breath sounds: Normal breath sounds. Abdominal:      Palpations: Abdomen is soft. Tenderness: There is no abdominal tenderness. There is no guarding. Musculoskeletal:         General: Normal range of motion. Cervical back: Normal range of motion and neck supple. Comments: Subjective pain reported at the right shoulder and right trapezius. No tenderness to palpation. The patient demonstrates full range of motion with pain reported. 5/5 strength and sensation b/l UE/LEs. Gait is steady and symmetrical.   Skin:     General: Skin is warm and dry. Neurological:      General: No focal deficit present. Mental Status: He is alert and oriented to person, place, and time. Psychiatric:         Mood and Affect: Mood normal.         Behavior: Behavior normal.        DIAGNOSTIC RESULTS   LABS:     No results found for this or any previous visit (from the past 12 hour(s)). EKG:  When ordered, EKG's are interpreted by the Emergency Department Physician in the absence of a cardiologist.  Please see their note for interpretation of EKG. RADIOLOGY:  Non-plain film images such as CT, Ultrasound and MRI are read by the radiologist. Plain radiographic images are visualized and preliminarily interpreted by the ED Provider with the below findings:          Interpretation per the Radiologist below, if available at the time of this note:     No results found. PROCEDURES   Unless otherwise noted below, none  Procedures     CRITICAL CARE TIME       EMERGENCY DEPARTMENT COURSE and DIFFERENTIAL DIAGNOSIS/MDM   Vitals:    Vitals:    02/06/23 0655   BP: (!) 146/99   Pulse: 98   Resp: 18   Temp: 97.9 °F (36.6 °C)   SpO2: 99%   Weight: 107 kg (235 lb 14.3 oz)   Height: 6' 3\" (1.905 m)        Patient was given the following medications:  Medications   naproxen (NAPROSYN) tablet 500 mg (500 mg Oral Given 2/6/23 0719)       CONSULTS: (Who and What was discussed)  None    Chronic Conditions: HTN    Social Determinants affecting Dx or Tx: None    Records Reviewed (source and summary of external records): Prior medical records, Previous Radiology studies, Previous Laboratory studies, and Nursing notes    CC/HPI Summary, DDx, ED Course, and Reassessment:     59-year-old male presents with acute onset right shoulder pain with radiculopathy down the arm and radiation up towards the neck that began abruptly when he pulled a gearshift while driving. On chart review, I see the patient was seen for something similar in January 2021. He presented endorsing right shoulder pain that radiated in a similar distribution, though at the time there was no triggering mechanism of injury and his blood pressure was noted to be quite high. At that time he was found to be having an NSTEMI. I asked him if this is in any way similar to that episode, and he denies. He states this definitely feels musculoskeletal and declines cardiac work-up when recommended.   Offered EKG at a minimum, and he again declines. I believe this is reasonable, given that the sxs were acute onset that the patient attributes to a specific exertional movement. He prefers to treat with NSAIDs, muscle relaxants, and heating pad at home. Very strict ED return precautions discussed. The patient verbalizes understanding and agreement. Disposition Considerations (Tests not done, Shared Decision Making, Pt Expectation of Test or Tx.): as above     FINAL IMPRESSION     1. Acute pain of right shoulder          DISPOSITION/PLAN   Discharged    Discharge Note: The patient is stable for discharge home. The signs, symptoms, diagnosis, and discharge instructions have been discussed, understanding conveyed, and agreed upon. The patient is to follow up as recommended or return to ER should their symptoms worsen. PATIENT REFERRED TO:  Follow-up Information       Follow up With Specialties Details Why Contact Info    [facility] EMERGENCY DEPT Emergency Medicine  As needed, If symptoms worsen [address] [place] [address]  [phone]    Nina Myrick MD Family Medicine Call  For follow up [address]                 DISCHARGE MEDICATIONS:  Discharge Medication List as of 2/6/2023  7:31 AM        START taking these medications    Details   cyclobenzaprine (FLEXERIL) 10 mg tablet Take 1 Tablet by mouth three (3) times daily as needed for Muscle Spasm(s) for up to 5 days. , Normal, Disp-15 Tablet, R-0      !! naproxen (NAPROSYN) 250 mg tablet Take 1 Tablet by mouth two (2) times daily (with meals) for 7 days. , Normal, Disp-14 Tablet, R-0       !! - Potential duplicate medications found. Please discuss with provider. CONTINUE these medications which have NOT CHANGED    Details   chlorthalidone (HYGROTON) 25 mg tablet Take 1 tablet by mouth once daily for blood pressure, Normal, Disp-30 Tablet, R-0      colchicine 0.6 mg tablet Take 1 Tablet by mouth daily. , Normal, Disp-20 Tablet, R-0      atenoloL (TENORMIN) 25 mg tablet Take 1 Tablet by mouth daily. , Normal, Disp-90 Tablet, R-0No further refills without office visit - 12/7/22      indomethacin (INDOCIN) 25 mg capsule Take one capsule by mouth three times daily as needed, Normal, Disp-30 Capsule, R-0      predniSONE (STERAPRED DS) 10 mg dose pack Take as directed, Normal, Disp-48 Tablet, R-0      allopurinoL (ZYLOPRIM) 100 mg tablet Take 1 Tablet by mouth daily. , Normal, Disp-60 Tablet, R-3      atorvastatin (LIPITOR) 20 mg tablet Take 1 Tab by mouth daily. For cholesterol, Normal, Disp-90 Tab, R-3      lisinopriL (PRINIVIL, ZESTRIL) 10 mg tablet Take 1 Tab by mouth daily. , Normal, Disp-90 Tab, R-3      !! naproxen (NAPROSYN) 500 mg tablet Take 1 Tab by mouth two (2) times daily (with meals). For neck pain, Normal, Disp-60 Tab, R-3      acetaminophen (TYLENOL) 500 mg tablet Take 2 Tabs by mouth every twelve (12) hours as needed for Pain. Take 2 tabs with one naproxen for severe pain, Normal, Disp-60 Tab, R-1      cetirizine (ZYRTEC) 10 mg tablet Take 1 Tab by mouth daily. , Print, Disp-20 Tab,R-0       !! - Potential duplicate medications found. Please discuss with provider. DISCONTINUED MEDICATIONS:  Discharge Medication List as of 2/6/2023  7:31 AM          ED Attending Involvement : I have seen and evaluated the patient. My supervision physician was available for consultation. I am the Primary Clinician of Record. Shaquille Barahona (electronically signed)    (Please note that parts of this dictation were completed with voice recognition software. Quite often unanticipated grammatical, syntax, homophones, and other interpretive errors are inadvertently transcribed by the computer software. Please disregards these errors.  Please excuse any errors that have escaped final proofreading.)

## 2023-02-18 ENCOUNTER — HOSPITAL ENCOUNTER (EMERGENCY)
Age: 53
Discharge: LEFT BEFORE DISCHARGE | End: 2023-02-18
Attending: STUDENT IN AN ORGANIZED HEALTH CARE EDUCATION/TRAINING PROGRAM
Payer: MEDICAID

## 2023-02-18 ENCOUNTER — APPOINTMENT (OUTPATIENT)
Dept: GENERAL RADIOLOGY | Age: 53
End: 2023-02-18
Attending: STUDENT IN AN ORGANIZED HEALTH CARE EDUCATION/TRAINING PROGRAM
Payer: MEDICAID

## 2023-02-18 ENCOUNTER — APPOINTMENT (OUTPATIENT)
Dept: GENERAL RADIOLOGY | Age: 53
End: 2023-02-18
Attending: EMERGENCY MEDICINE
Payer: MEDICAID

## 2023-02-18 VITALS
TEMPERATURE: 97.7 F | BODY MASS INDEX: 29.96 KG/M2 | DIASTOLIC BLOOD PRESSURE: 119 MMHG | OXYGEN SATURATION: 100 % | HEART RATE: 76 BPM | SYSTOLIC BLOOD PRESSURE: 171 MMHG | HEIGHT: 75 IN | RESPIRATION RATE: 16 BRPM | WEIGHT: 240.96 LBS

## 2023-02-18 DIAGNOSIS — K08.89 PAIN, DENTAL: ICD-10-CM

## 2023-02-18 DIAGNOSIS — M25.511 ACUTE PAIN OF RIGHT SHOULDER: Primary | ICD-10-CM

## 2023-02-18 LAB
ALBUMIN SERPL-MCNC: 4 G/DL (ref 3.5–5)
ALBUMIN/GLOB SERPL: 1 (ref 1.1–2.2)
ALP SERPL-CCNC: 68 U/L (ref 45–117)
ALT SERPL-CCNC: 41 U/L (ref 12–78)
ANION GAP SERPL CALC-SCNC: 10 MMOL/L (ref 5–15)
AST SERPL-CCNC: 41 U/L (ref 15–37)
BASOPHILS # BLD: 0.1 K/UL (ref 0–0.1)
BASOPHILS NFR BLD: 2 % (ref 0–1)
BILIRUB SERPL-MCNC: 0.5 MG/DL (ref 0.2–1)
BNP SERPL-MCNC: 20 PG/ML
BUN SERPL-MCNC: 10 MG/DL (ref 6–20)
BUN/CREAT SERPL: 9 (ref 12–20)
CALCIUM SERPL-MCNC: 9.1 MG/DL (ref 8.5–10.1)
CHLORIDE SERPL-SCNC: 103 MMOL/L (ref 97–108)
CO2 SERPL-SCNC: 25 MMOL/L (ref 21–32)
CREAT SERPL-MCNC: 1.15 MG/DL (ref 0.7–1.3)
DIFFERENTIAL METHOD BLD: ABNORMAL
EOSINOPHIL # BLD: 0.4 K/UL (ref 0–0.4)
EOSINOPHIL NFR BLD: 7 % (ref 0–7)
ERYTHROCYTE [DISTWIDTH] IN BLOOD BY AUTOMATED COUNT: 14.2 % (ref 11.5–14.5)
GLOBULIN SER CALC-MCNC: 4 G/DL (ref 2–4)
GLUCOSE SERPL-MCNC: 108 MG/DL (ref 65–100)
HCT VFR BLD AUTO: 45.6 % (ref 36.6–50.3)
HGB BLD-MCNC: 15 G/DL (ref 12.1–17)
IMM GRANULOCYTES # BLD AUTO: 0 K/UL (ref 0–0.04)
IMM GRANULOCYTES NFR BLD AUTO: 0 % (ref 0–0.5)
LYMPHOCYTES # BLD: 1.2 K/UL (ref 0.8–3.5)
LYMPHOCYTES NFR BLD: 23 % (ref 12–49)
MCH RBC QN AUTO: 29.1 PG (ref 26–34)
MCHC RBC AUTO-ENTMCNC: 32.9 G/DL (ref 30–36.5)
MCV RBC AUTO: 88.4 FL (ref 80–99)
MONOCYTES # BLD: 0.6 K/UL (ref 0–1)
MONOCYTES NFR BLD: 12 % (ref 5–13)
NEUTS SEG # BLD: 2.9 K/UL (ref 1.8–8)
NEUTS SEG NFR BLD: 56 % (ref 32–75)
NRBC # BLD: 0 K/UL (ref 0–0.01)
NRBC BLD-RTO: 0 PER 100 WBC
PLATELET # BLD AUTO: 205 K/UL (ref 150–400)
PMV BLD AUTO: 11.3 FL (ref 8.9–12.9)
POTASSIUM SERPL-SCNC: 3.3 MMOL/L (ref 3.5–5.1)
PROT SERPL-MCNC: 8 G/DL (ref 6.4–8.2)
RBC # BLD AUTO: 5.16 M/UL (ref 4.1–5.7)
SODIUM SERPL-SCNC: 138 MMOL/L (ref 136–145)
TROPONIN I SERPL HS-MCNC: 33 NG/L (ref 0–76)
WBC # BLD AUTO: 5.1 K/UL (ref 4.1–11.1)

## 2023-02-18 PROCEDURE — 96374 THER/PROPH/DIAG INJ IV PUSH: CPT

## 2023-02-18 PROCEDURE — 84484 ASSAY OF TROPONIN QUANT: CPT

## 2023-02-18 PROCEDURE — 99285 EMERGENCY DEPT VISIT HI MDM: CPT

## 2023-02-18 PROCEDURE — 74011250636 HC RX REV CODE- 250/636: Performed by: STUDENT IN AN ORGANIZED HEALTH CARE EDUCATION/TRAINING PROGRAM

## 2023-02-18 PROCEDURE — 93005 ELECTROCARDIOGRAM TRACING: CPT

## 2023-02-18 PROCEDURE — 36415 COLL VENOUS BLD VENIPUNCTURE: CPT

## 2023-02-18 PROCEDURE — 85025 COMPLETE CBC W/AUTO DIFF WBC: CPT

## 2023-02-18 PROCEDURE — 83880 ASSAY OF NATRIURETIC PEPTIDE: CPT

## 2023-02-18 PROCEDURE — 74011250637 HC RX REV CODE- 250/637: Performed by: STUDENT IN AN ORGANIZED HEALTH CARE EDUCATION/TRAINING PROGRAM

## 2023-02-18 PROCEDURE — 74011000250 HC RX REV CODE- 250: Performed by: STUDENT IN AN ORGANIZED HEALTH CARE EDUCATION/TRAINING PROGRAM

## 2023-02-18 PROCEDURE — 71045 X-RAY EXAM CHEST 1 VIEW: CPT

## 2023-02-18 PROCEDURE — 80053 COMPREHEN METABOLIC PANEL: CPT

## 2023-02-18 RX ORDER — METHOCARBAMOL 750 MG/1
750 TABLET, FILM COATED ORAL 4 TIMES DAILY
Qty: 20 TABLET | Refills: 0 | Status: SHIPPED | OUTPATIENT
Start: 2023-02-18 | End: 2023-02-23

## 2023-02-18 RX ORDER — LIDOCAINE 4 G/100G
1 PATCH TOPICAL EVERY 24 HOURS
Status: DISCONTINUED | OUTPATIENT
Start: 2023-02-18 | End: 2023-02-19 | Stop reason: HOSPADM

## 2023-02-18 RX ORDER — DIAZEPAM 5 MG/1
5 TABLET ORAL
Status: COMPLETED | OUTPATIENT
Start: 2023-02-18 | End: 2023-02-18

## 2023-02-18 RX ORDER — AMOXICILLIN AND CLAVULANATE POTASSIUM 875; 125 MG/1; MG/1
1 TABLET, FILM COATED ORAL 2 TIMES DAILY
Qty: 14 TABLET | Refills: 0 | Status: SHIPPED | OUTPATIENT
Start: 2023-02-18 | End: 2023-02-25

## 2023-02-18 RX ORDER — KETOROLAC TROMETHAMINE 30 MG/ML
15 INJECTION, SOLUTION INTRAMUSCULAR; INTRAVENOUS
Status: COMPLETED | OUTPATIENT
Start: 2023-02-18 | End: 2023-02-18

## 2023-02-18 RX ADMIN — KETOROLAC TROMETHAMINE 15 MG: 30 INJECTION, SOLUTION INTRAMUSCULAR; INTRAVENOUS at 21:57

## 2023-02-18 RX ADMIN — DIAZEPAM 5 MG: 5 TABLET ORAL at 21:57

## 2023-02-18 NOTE — Clinical Note
[address]  [facility] EMERGENCY DEPT  [address] [facility]  [place] [id number] [phone]    Work/School Note    Date: 2/18/2023    To Whom It May concern:    Shlomo Justin was seen and treated today in the emergency room by the following provider(s):  Attending Provider: Tin Crowley Latanya Mendenhall is excused from work/school on 02/18/23 and 02/19/23. He is medically clear to return to work/school on 2/20/2023.        Sincerely,          Lou Boothe, DO

## 2023-02-19 LAB
ATRIAL RATE: 82 BPM
CALCULATED P AXIS, ECG09: 2 DEGREES
CALCULATED R AXIS, ECG10: -22 DEGREES
CALCULATED T AXIS, ECG11: -24 DEGREES
DIAGNOSIS, 93000: NORMAL
P-R INTERVAL, ECG05: 186 MS
Q-T INTERVAL, ECG07: 380 MS
QRS DURATION, ECG06: 92 MS
QTC CALCULATION (BEZET), ECG08: 443 MS
VENTRICULAR RATE, ECG03: 82 BPM

## 2023-02-19 NOTE — ED PROVIDER NOTES
[facility] EMERGENCY DEPT  EMERGENCY DEPARTMENT ENCOUNTER       Pt Name: Leandro Delgado  MRN: [id number]  [place] 1970  Date of evaluation: 2/18/2023  Provider: Ruthie Castellanos DO   PCP: Mimi Estrada MD  Note Started: 11:03 PM 2/18/23     CHIEF COMPLAINT       Chief Complaint   Patient presents with    Shortness of Breath     Pt arrives ambulatory to triage for SOB that began today. Pt denies any respiratory hx but hx of MI. Shoulder Pain     Pt advises his right shoulder/neck has moreno hurting since 2/6 and was given naproxen and a muscle relaxer w/o relief. Hx of Mi     Headache     Headache that began today. Naproxen has not given any relief. Mouth Pain     X3 weeks ago pt had dental work done and states is is concerned his dental work has came undone. His entire right side of mouth hurts. HISTORY OF PRESENT ILLNESS: 1 or more elements      History From: Patient  HPI Limitations : None     Leandro Delgado is a 46 y.o. male who presents with multiple complaints. Patient reports he has had right shoulder and neck pain that has been hurting since 2/6 when he was seen in the ER for that. Patient states that he was falling a tractor trailer when he felt a pull in his neck. He was seen in the ER here and discharged with Flexeril and naproxen. He states that \"has not done anything\". He returns due to worsening pain. He has a history of an NSTEMI that presented as right shoulder and right-sided neck pain in the past.  Also has history of high blood pressure. He denies any chest pain. He states he is short of breath when he pain and spasm comes on. He is additionally reporting right upper dental pain. He states this has been hurting for the past several days. He states he had a crown removed 3 weeks ago and the right side of his mouth is hurting which is radiating up into his head and causing a headache. He denies any recent illnesses.   He denies any fever, chills, nausea, vomiting, diarrhea. He denies any trauma. He denies any chiropractic manipulation. Nursing Notes were all reviewed and agreed with or any disagreements were addressed in the HPI. REVIEW OF SYSTEMS      Review of Systems   Constitutional:  Negative for chills. HENT:  Positive for dental problem. Respiratory:  Negative for shortness of breath. Cardiovascular:  Negative for chest pain. Gastrointestinal:  Negative for diarrhea, nausea and vomiting. Musculoskeletal:  Positive for arthralgias and neck pain. Neurological:  Positive for headaches. Negative for weakness and numbness. Positives and Pertinent negatives as per HPI. PAST HISTORY     Past Medical History:  Past Medical History:   Diagnosis Date    Gout     H/O seasonal allergies     Hypertension     Other ill-defined conditions(179.66)     gout       Past Surgical History:  Past Surgical History:   Procedure Laterality Date    HX HEENT      eyes    HX ORTHOPAEDIC      right knee petella tendon repair    HX RETINAL DETACHMENT REPAIR         Family History:  Family History   Problem Relation Age of Onset    Diabetes Neg Hx        Social History:  Social History     Tobacco Use    Smoking status: Former     Types: Cigarettes    Smokeless tobacco: Never   Vaping Use    Vaping Use: Never used   Substance Use Topics    Alcohol use: Yes     Alcohol/week: 14.0 standard drinks     Types: 14 Cans of beer per week     Comment: \"6pack beer day\"    Drug use: No       Allergies: Allergies   Allergen Reactions    Tramadol Nausea and Vomiting       CURRENT MEDICATIONS      Previous Medications    ACETAMINOPHEN (TYLENOL) 500 MG TABLET    Take 2 Tabs by mouth every twelve (12) hours as needed for Pain. Take 2 tabs with one naproxen for severe pain    ALLOPURINOL (ZYLOPRIM) 100 MG TABLET    Take 1 Tablet by mouth daily. ATENOLOL (TENORMIN) 25 MG TABLET    Take 1 Tablet by mouth daily. ATORVASTATIN (LIPITOR) 20 MG TABLET    Take 1 Tab by mouth daily. For cholesterol    CETIRIZINE (ZYRTEC) 10 MG TABLET    Take 1 Tab by mouth daily. CHLORTHALIDONE (HYGROTON) 25 MG TABLET    Take 1 tablet by mouth once daily for blood pressure    COLCHICINE 0.6 MG TABLET    Take 1 Tablet by mouth daily. INDOMETHACIN (INDOCIN) 25 MG CAPSULE    Take one capsule by mouth three times daily as needed    LISINOPRIL (PRINIVIL, ZESTRIL) 10 MG TABLET    Take 1 Tab by mouth daily. NAPROXEN (NAPROSYN) 500 MG TABLET    Take 1 Tab by mouth two (2) times daily (with meals). For neck pain    PREDNISONE (STERAPRED DS) 10 MG DOSE PACK    Take as directed       SCREENINGS               No data recorded         PHYSICAL EXAM      ED Triage Vitals [02/18/23 1957]   ED Encounter Vitals Group      BP (!) 171/119      Pulse (Heart Rate) 76      Resp Rate 16      Temp 97.7 °F (36.5 °C)      Temp src       O2 Sat (%) 100 %      Weight 240 lb 15.4 oz      Height 6' 3\"        Physical Exam  Vitals and nursing note reviewed. Constitutional:       Appearance: Normal appearance. HENT:      Head: Normocephalic and atraumatic. Mouth/Throat:      Mouth: Mucous membranes are moist.        Comments: Reporting irritation to above tooth, tenderness to palpation at the root, no obvious abscess  Eyes:      Conjunctiva/sclera: Conjunctivae normal.   Cardiovascular:      Rate and Rhythm: Normal rate and regular rhythm. Pulmonary:      Effort: Pulmonary effort is normal.      Breath sounds: Normal breath sounds. Abdominal:      General: Abdomen is flat. There is no distension. Tenderness: There is no abdominal tenderness. Musculoskeletal:        Arms:       Comments: + right sided upper trapezius tenderness to palpation, no midline cervical spine tenderness to palpation, no bony tenderness   Skin:     General: Skin is warm and dry. Neurological:      Mental Status: He is alert. Mental status is at baseline.         DIAGNOSTIC RESULTS   LABS:     Recent Results (from the past 12 hour(s))   EKG, 12 LEAD, INITIAL    Collection Time: 02/18/23  8:03 PM   Result Value Ref Range    Ventricular Rate 82 BPM    Atrial Rate 82 BPM    P-R Interval 186 ms    QRS Duration 92 ms    Q-T Interval 380 ms    QTC Calculation (Bezet) 443 ms    Calculated P Axis 2 degrees    Calculated R Axis -22 degrees    Calculated T Axis -24 degrees    Diagnosis       Normal sinus rhythm  Minimal voltage criteria for LVH, may be normal variant  When compared with ECG of 23-SEP-2021 16:28,  No significant change was found     CBC WITH AUTOMATED DIFF    Collection Time: 02/18/23  8:20 PM   Result Value Ref Range    WBC 5.1 4.1 - 11.1 K/uL    RBC 5.16 4.10 - 5.70 M/uL    HGB 15.0 12.1 - 17.0 g/dL    HCT 45.6 36.6 - 50.3 %    MCV 88.4 80.0 - 99.0 FL    MCH 29.1 26.0 - 34.0 PG    MCHC 32.9 30.0 - 36.5 g/dL    RDW 14.2 11.5 - 14.5 %    PLATELET 055 030 - 425 K/uL    MPV 11.3 8.9 - 12.9 FL    NRBC 0.0 0  WBC    ABSOLUTE NRBC 0.00 0.00 - 0.01 K/uL    NEUTROPHILS 56 32 - 75 %    LYMPHOCYTES 23 12 - 49 %    MONOCYTES 12 5 - 13 %    EOSINOPHILS 7 0 - 7 %    BASOPHILS 2 (H) 0 - 1 %    IMMATURE GRANULOCYTES 0 0.0 - 0.5 %    ABS. NEUTROPHILS 2.9 1.8 - 8.0 K/UL    ABS. LYMPHOCYTES 1.2 0.8 - 3.5 K/UL    ABS. MONOCYTES 0.6 0.0 - 1.0 K/UL    ABS. EOSINOPHILS 0.4 0.0 - 0.4 K/UL    ABS. BASOPHILS 0.1 0.0 - 0.1 K/UL    ABS. IMM. GRANS. 0.0 0.00 - 0.04 K/UL    DF AUTOMATED     METABOLIC PANEL, COMPREHENSIVE    Collection Time: 02/18/23  8:20 PM   Result Value Ref Range    Sodium 138 136 - 145 mmol/L    Potassium 3.3 (L) 3.5 - 5.1 mmol/L    Chloride 103 97 - 108 mmol/L    CO2 25 21 - 32 mmol/L    Anion gap 10 5 - 15 mmol/L    Glucose 108 (H) 65 - 100 mg/dL    BUN 10 6 - 20 MG/DL    Creatinine 1.15 0.70 - 1.30 MG/DL    BUN/Creatinine ratio 9 (L) 12 - 20      eGFR >60 >60 ml/min/1.73m2    Calcium 9.1 8.5 - 10.1 MG/DL    Bilirubin, total 0.5 0.2 - 1.0 MG/DL    ALT (SGPT) 41 12 - 78 U/L    AST (SGOT) 41 (H) 15 - 37 U/L    Alk.  phosphatase 68 45 - 117 U/L Protein, total 8.0 6.4 - 8.2 g/dL    Albumin 4.0 3.5 - 5.0 g/dL    Globulin 4.0 2.0 - 4.0 g/dL    A-G Ratio 1.0 (L) 1.1 - 2.2     TROPONIN-HIGH SENSITIVITY    Collection Time: 02/18/23  8:20 PM   Result Value Ref Range    Troponin-High Sensitivity 33 0 - 76 ng/L   NT-PRO BNP    Collection Time: 02/18/23  8:20 PM   Result Value Ref Range    NT pro-BNP 20 <125 PG/ML        EKG interpreted by me: Normal sinus rhythm, T wave inversion  3, aVF, unchanged from previous     RADIOLOGY:  Non-plain film images such as CT, Ultrasound and MRI are read by the radiologist. Plain radiographic images are visualized and preliminarily interpreted by the ED Provider with the below findings:       Interpretation per the Radiologist below, if available at the time of this note:     XR CHEST PORT    Result Date: 2/18/2023  INDICATION: . sob Additional history: Dyspnea COMPARISON: Previous chest xray, 9/23/2021. LIMITATIONS: Portable technique. Erasto Mathias FINDINGS: Single frontal view of the chest. . Lines/tubes/surgical: None. Heart/mediastinum: Unremarkable. Lungs/pleura:  No focal consolidation or mass. No visualized pleural effusion or pneumothorax. Additional Comments: None. .    1. No radiographic evidence of acute cardiopulmonary disease.            PROCEDURES   Unless otherwise noted below, none  Procedures     CRITICAL CARE TIME       EMERGENCY DEPARTMENT COURSE and DIFFERENTIAL DIAGNOSIS/MDM   Vitals:    Vitals:    02/18/23 1957   BP: (!) 171/119   Pulse: 76   Resp: 16   Temp: 97.7 °F (36.5 °C)   SpO2: 100%   Weight: 109.3 kg (240 lb 15.4 oz)   Height: 6' 3\" (1.905 m)        Patient was given the following medications:  Medications   lidocaine 4 % patch 1 Patch (1 Patch TransDERmal Apply Patch 2/18/23 2156)   diazePAM (VALIUM) tablet 5 mg (5 mg Oral Given 2/18/23 2157)   ketorolac (TORADOL) injection 15 mg (15 mg IntraVENous Given 2/18/23 2157)       CONSULTS: (Who and What was discussed)  None    Chronic Conditions: gout    Social Determinants affecting Dx or Tx: None    Records Reviewed (source and summary of external notes): Prior medical records, Previous Radiology studies, Previous Laboratory studies, and Previous EKGs including previous EKG from previous admission, admission for NSTEMI through her right shoulder and right-sided neck pain. CC/HPI Summary, DDx, ED Course, and Reassessment: Patient presenting to the emergency department with chief complaint of right shoulder/right-sided neck pain, onset 13 days ago and progressively worsening. In the emergency department, prescribed Flexeril and naproxen without any improvement. Tenderness to palpation of the right trapezius reproduces complaint. Differentials include musculoskeletal versus ACS versus arrhythmia. Lower suspicion for acute respiratory etiologies given normal lung sounds and complaint of shortness of breath only when the pain \"takes his breath away\". He does have some tenderness to palpation along the root of the tooth that previously had a crown removed from it and did not see any obvious abscess but will treat with course of antibiotics this patient is scheduled to see his dentist next week. We will treat with Valium, Toradol, lidocaine patch for patient's right shoulder pain. We will repeat troponin, obtain x-ray. ED Course as of 02/18/23 2303   Sat Feb 18, 2023   2150 Patient stating that he only wants percocet. Accepted valium and toradol. [NM]   2476 Patient refused to wait for 2nd troponin, stating his lady has to work tomorrow and he has no interest in waiting. [NM]      ED Course User Index  [NM] Alex Collins, DO     Patient is alert, oriented and capable of making his own medical decisions. I discussed that given his history of NSTEMI they recommended a second troponin and patient stated that he did no longer wanted to wait. He is a separate set that he did not receive Percocet as he states he [place] gets that when he comes here\".   I discussed that pending his lab work we could discuss additional dose of pain medicine but he just received his previous dose of pain medication. He stated he did not want to wait, discharged AMA    Disposition Considerations (Tests not done, Shared Decision Making, Pt Expectation of Test or Tx.): See above     FINAL IMPRESSION     1. Acute pain of right shoulder    2. Pain, dental          DISPOSITION/PLAN   AMA    AMA     PATIENT REFERRED TO:  Follow-up Information       Follow up With Specialties Details Why Contact Info    Alida Varela MD Family Medicine  If symptoms worsen Jina Monaco.  [phone]      [facility] EMERGENCY DEPT Emergency Medicine  If symptoms worsen [address] [place] [address]  [phone]              DISCHARGE MEDICATIONS:  Current Discharge Medication List        START taking these medications    Details   methocarbamoL (ROBAXIN) 750 mg tablet Take 1 Tablet by mouth four (4) times daily for 5 days. Qty: 20 Tablet, Refills: 0  Start date: 2/18/2023, End date: 2/23/2023      amoxicillin-clavulanate (Augmentin) 875-125 mg per tablet Take 1 Tablet by mouth two (2) times a day for 7 days. Qty: 14 Tablet, Refills: 0  Start date: 2/18/2023, End date: 2/25/2023               DISCONTINUED MEDICATIONS:  Current Discharge Medication List            (Please note that parts of this dictation were completed with voice recognition software. Quite often unanticipated grammatical, syntax, homophones, and other interpretive errors are inadvertently transcribed by the computer software. Please disregards these errors.  Please excuse any errors that have escaped final proofreading.)    Maya Gosselin,

## 2023-03-13 RX ORDER — CHLORTHALIDONE 25 MG/1
TABLET ORAL
Qty: 30 TABLET | Refills: 0 | Status: SHIPPED | OUTPATIENT
Start: 2023-03-13

## 2023-03-13 NOTE — TELEPHONE ENCOUNTER
Last Visit: 1/28/21 with MD Stew Pate  Next Appointment: no show 2/8/23  Previous Refill Encounter(s): 2/8/23 #30    Requested Prescriptions     Pending Prescriptions Disp Refills    chlorthalidone (HYGROTON) 25 mg tablet [Pharmacy Med Name: Chlorthalidone 25 MG Oral Tablet] 30 Tablet 0     Sig: Take 1 tablet by mouth once daily for blood pressure         For Pharmacy Admin Tracking Only    Program: Medication Refill  CPA in place:   Recommendation Provided To:    Intervention Detail: New Rx: 1, reason: Patient Preference and Scheduled Appointment  Intervention Accepted By:   Fredrick Ortiz Closed?:   Time Spent (min): 5

## 2023-06-04 ENCOUNTER — HOSPITAL ENCOUNTER (EMERGENCY)
Facility: HOSPITAL | Age: 53
Discharge: HOME | End: 2023-06-04
Payer: MEDICAID

## 2023-06-04 ENCOUNTER — APPOINTMENT (OUTPATIENT)
Facility: HOSPITAL | Age: 53
End: 2023-06-04
Payer: MEDICAID

## 2023-06-04 VITALS
SYSTOLIC BLOOD PRESSURE: 148 MMHG | WEIGHT: 246.03 LBS | BODY MASS INDEX: 30.75 KG/M2 | TEMPERATURE: 97.7 F | RESPIRATION RATE: 20 BRPM | OXYGEN SATURATION: 100 % | HEART RATE: 68 BPM | DIASTOLIC BLOOD PRESSURE: 108 MMHG

## 2023-06-04 DIAGNOSIS — M87.9 OSTEONECROSIS (HCC): ICD-10-CM

## 2023-06-04 DIAGNOSIS — M13.0 POLYARTHROPATHY: Primary | ICD-10-CM

## 2023-06-04 PROCEDURE — 73562 X-RAY EXAM OF KNEE 3: CPT

## 2023-06-04 PROCEDURE — 6370000000 HC RX 637 (ALT 250 FOR IP): Performed by: PHYSICIAN ASSISTANT

## 2023-06-04 PROCEDURE — 99283 EMERGENCY DEPT VISIT LOW MDM: CPT

## 2023-06-04 RX ORDER — OXYCODONE HYDROCHLORIDE AND ACETAMINOPHEN 5; 325 MG/1; MG/1
1 TABLET ORAL
Status: COMPLETED | OUTPATIENT
Start: 2023-06-04 | End: 2023-06-04

## 2023-06-04 RX ORDER — COLCHICINE 0.6 MG/1
0.6 TABLET ORAL DAILY
Qty: 14 TABLET | Refills: 0 | Status: SHIPPED | OUTPATIENT
Start: 2023-06-04 | End: 2023-06-18

## 2023-06-04 RX ORDER — COLCHICINE 0.6 MG/1
0.6 TABLET ORAL DAILY
Status: DISCONTINUED | OUTPATIENT
Start: 2023-06-04 | End: 2023-06-04 | Stop reason: HOSPADM

## 2023-06-04 RX ORDER — OXYCODONE HYDROCHLORIDE 5 MG/1
5 TABLET ORAL EVERY 6 HOURS PRN
Qty: 9 TABLET | Refills: 0 | Status: SHIPPED | OUTPATIENT
Start: 2023-06-04 | End: 2023-06-07

## 2023-06-04 RX ORDER — ACETAMINOPHEN 325 MG/1
650 TABLET ORAL EVERY 6 HOURS PRN
Qty: 56 TABLET | Refills: 0 | Status: SHIPPED | OUTPATIENT
Start: 2023-06-04 | End: 2023-06-11

## 2023-06-04 RX ADMIN — OXYCODONE HYDROCHLORIDE AND ACETAMINOPHEN 1 TABLET: 5; 325 TABLET ORAL at 12:08

## 2023-06-04 RX ADMIN — COLCHICINE 0.6 MG: 0.6 TABLET ORAL at 12:08

## 2023-06-04 ASSESSMENT — PAIN SCALES - GENERAL: PAINLEVEL_OUTOF10: 10

## 2023-08-25 ENCOUNTER — APPOINTMENT (OUTPATIENT)
Facility: HOSPITAL | Age: 53
End: 2023-08-25
Payer: MEDICAID

## 2023-08-25 ENCOUNTER — HOSPITAL ENCOUNTER (EMERGENCY)
Facility: HOSPITAL | Age: 53
Discharge: HOME | End: 2023-08-25
Payer: MEDICAID

## 2023-08-25 VITALS
TEMPERATURE: 98.4 F | SYSTOLIC BLOOD PRESSURE: 138 MMHG | WEIGHT: 250.88 LBS | RESPIRATION RATE: 18 BRPM | BODY MASS INDEX: 31.36 KG/M2 | OXYGEN SATURATION: 97 % | HEART RATE: 74 BPM | DIASTOLIC BLOOD PRESSURE: 97 MMHG

## 2023-08-25 DIAGNOSIS — J20.9 ACUTE BRONCHITIS, UNSPECIFIED ORGANISM: Primary | ICD-10-CM

## 2023-08-25 PROCEDURE — 6370000000 HC RX 637 (ALT 250 FOR IP)

## 2023-08-25 PROCEDURE — 99283 EMERGENCY DEPT VISIT LOW MDM: CPT

## 2023-08-25 PROCEDURE — 71046 X-RAY EXAM CHEST 2 VIEWS: CPT

## 2023-08-25 RX ORDER — METHYLPREDNISOLONE 4 MG/1
TABLET ORAL
Qty: 1 KIT | Refills: 0 | Status: SHIPPED | OUTPATIENT
Start: 2023-08-25

## 2023-08-25 RX ORDER — BENZONATATE 200 MG/1
200 CAPSULE ORAL 3 TIMES DAILY PRN
Qty: 21 CAPSULE | Refills: 0 | Status: SHIPPED | OUTPATIENT
Start: 2023-08-25 | End: 2023-09-01

## 2023-08-25 RX ORDER — CODEINE PHOSPHATE AND GUAIFENESIN 10; 100 MG/5ML; MG/5ML
5 SOLUTION ORAL ONCE
Status: COMPLETED | OUTPATIENT
Start: 2023-08-25 | End: 2023-08-25

## 2023-08-25 RX ORDER — PREDNISONE 20 MG/1
40 TABLET ORAL ONCE
Status: COMPLETED | OUTPATIENT
Start: 2023-08-25 | End: 2023-08-25

## 2023-08-25 RX ORDER — ALBUTEROL SULFATE 90 UG/1
2 AEROSOL, METERED RESPIRATORY (INHALATION) 4 TIMES DAILY PRN
Qty: 54 G | Refills: 1 | Status: SHIPPED | OUTPATIENT
Start: 2023-08-25

## 2023-08-25 RX ORDER — ACETAMINOPHEN 500 MG
1000 TABLET ORAL
Status: COMPLETED | OUTPATIENT
Start: 2023-08-25 | End: 2023-08-25

## 2023-08-25 RX ORDER — LEVOCETIRIZINE DIHYDROCHLORIDE 5 MG/1
5 TABLET, FILM COATED ORAL NIGHTLY
Qty: 30 TABLET | Refills: 0 | Status: SHIPPED | OUTPATIENT
Start: 2023-08-25

## 2023-08-25 RX ADMIN — ACETAMINOPHEN 1000 MG: 500 TABLET ORAL at 15:19

## 2023-08-25 RX ADMIN — GUAIFENESIN AND CODEINE PHOSPHATE 5 ML: 100; 10 SOLUTION ORAL at 15:20

## 2023-08-25 RX ADMIN — PREDNISONE 40 MG: 20 TABLET ORAL at 15:19

## 2023-08-25 ASSESSMENT — ENCOUNTER SYMPTOMS
GASTROINTESTINAL NEGATIVE: 1
RHINORRHEA: 1
SORE THROAT: 1
SHORTNESS OF BREATH: 0
SINUS PRESSURE: 1
VOMITING: 0
NAUSEA: 0
EYE REDNESS: 0
ABDOMINAL PAIN: 0
CHEST TIGHTNESS: 0

## 2023-08-25 ASSESSMENT — PAIN SCALES - GENERAL: PAINLEVEL_OUTOF10: 8

## 2023-08-25 NOTE — ED PROVIDER NOTES
[facility] EMERGENCY DEPT  EMERGENCY DEPARTMENT ENCOUNTER       Pt Name: Ernestina Rubio  MRN: [id number]  [address] [facility] 1970  Date of evaluation: 8/25/2023  Provider: BRIANA Frank CNP   PCP: No primary care provider on file. Note Started:  3:10 PM EDT 8/25/23     CHIEF COMPLAINT       Chief Complaint   Patient presents with    Cough     Onset Tuesday night with cough, that has gotten worse. Pt and wife tested negative for Covid-his wifeworks at a hospital. Pt has coarse cough. The cough spell at home so hard it shot a sharp pain through his head. HISTORY OF PRESENT ILLNESS: 1 or more elements      History From: Patient  HPI Limitations: None     Ernestina Rubio is a 46 y.o. male who presents with shortness of breath, headache, and non productive cough x4 days. Patient reports history of allergies. Denies history of asthma or COPD, denies tobacco abuse. Patient denies fever, chills, fatigue, earache, shortness of breath, chest pain, abdominal pain, nausea vomiting, diarrhea, dizziness or generalized myalgia. Nursing Notes were all reviewed and agreed with or any disagreements were addressed in the HPI. REVIEW OF SYSTEMS      Review of Systems   Constitutional:  Negative for activity change, appetite change, chills and fatigue. HENT:  Positive for congestion, rhinorrhea, sinus pressure and sore throat. Negative for ear pain and postnasal drip. Eyes:  Negative for redness. Respiratory:  Negative for chest tightness and shortness of breath. Cardiovascular:  Negative for chest pain. Gastrointestinal: Negative. Negative for abdominal pain, nausea and vomiting. Musculoskeletal:  Negative for myalgias. Skin:  Negative for rash. Neurological:  Positive for headaches. Negative for dizziness, weakness and numbness. Positives and Pertinent negatives as per HPI.     PAST HISTORY     Past Medical History:  Past Medical History:   Diagnosis Date    Gout     H/O seasonal allergies

## 2023-09-12 ENCOUNTER — HOSPITAL ENCOUNTER (EMERGENCY)
Facility: HOSPITAL | Age: 53
Discharge: HOME | End: 2023-09-12
Attending: EMERGENCY MEDICINE
Payer: MEDICAID

## 2023-09-12 VITALS
RESPIRATION RATE: 20 BRPM | WEIGHT: 248 LBS | HEIGHT: 75 IN | DIASTOLIC BLOOD PRESSURE: 89 MMHG | TEMPERATURE: 98.8 F | BODY MASS INDEX: 30.84 KG/M2 | OXYGEN SATURATION: 97 % | SYSTOLIC BLOOD PRESSURE: 140 MMHG | HEART RATE: 76 BPM

## 2023-09-12 DIAGNOSIS — M10.9 ACUTE GOUT INVOLVING TOE OF LEFT FOOT, UNSPECIFIED CAUSE: Primary | ICD-10-CM

## 2023-09-12 DIAGNOSIS — J40 BRONCHITIS: ICD-10-CM

## 2023-09-12 LAB
ALBUMIN SERPL-MCNC: 3.2 G/DL (ref 3.5–5)
ALBUMIN/GLOB SERPL: 0.8 (ref 1.1–2.2)
ALP SERPL-CCNC: 64 U/L (ref 45–117)
ALT SERPL-CCNC: 28 U/L (ref 12–78)
ANION GAP SERPL CALC-SCNC: 5 MMOL/L (ref 5–15)
AST SERPL-CCNC: 22 U/L (ref 15–37)
BASOPHILS # BLD: 0 K/UL (ref 0–0.1)
BASOPHILS NFR BLD: 1 % (ref 0–1)
BILIRUB SERPL-MCNC: 0.5 MG/DL (ref 0.2–1)
BUN SERPL-MCNC: 8 MG/DL (ref 6–20)
BUN/CREAT SERPL: 7 (ref 12–20)
CALCIUM SERPL-MCNC: 9.1 MG/DL (ref 8.5–10.1)
CHLORIDE SERPL-SCNC: 108 MMOL/L (ref 97–108)
CO2 SERPL-SCNC: 27 MMOL/L (ref 21–32)
COMMENT:: NORMAL
CREAT SERPL-MCNC: 1.09 MG/DL (ref 0.7–1.3)
DIFFERENTIAL METHOD BLD: ABNORMAL
EOSINOPHIL # BLD: 0.3 K/UL (ref 0–0.4)
EOSINOPHIL NFR BLD: 5 % (ref 0–7)
ERYTHROCYTE [DISTWIDTH] IN BLOOD BY AUTOMATED COUNT: 14.8 % (ref 11.5–14.5)
GLOBULIN SER CALC-MCNC: 4 G/DL (ref 2–4)
GLUCOSE SERPL-MCNC: 96 MG/DL (ref 65–100)
HCT VFR BLD AUTO: 38.4 % (ref 36.6–50.3)
HGB BLD-MCNC: 12.7 G/DL (ref 12.1–17)
IMM GRANULOCYTES # BLD AUTO: 0 K/UL (ref 0–0.04)
IMM GRANULOCYTES NFR BLD AUTO: 0 % (ref 0–0.5)
LYMPHOCYTES # BLD: 1 K/UL (ref 0.8–3.5)
LYMPHOCYTES NFR BLD: 18 % (ref 12–49)
MCH RBC QN AUTO: 29.3 PG (ref 26–34)
MCHC RBC AUTO-ENTMCNC: 33.1 G/DL (ref 30–36.5)
MCV RBC AUTO: 88.5 FL (ref 80–99)
MONOCYTES # BLD: 0.6 K/UL (ref 0–1)
MONOCYTES NFR BLD: 11 % (ref 5–13)
NEUTS SEG # BLD: 3.7 K/UL (ref 1.8–8)
NEUTS SEG NFR BLD: 65 % (ref 32–75)
NRBC # BLD: 0 K/UL (ref 0–0.01)
NRBC BLD-RTO: 0 PER 100 WBC
NT PRO BNP: 83 PG/ML
PLATELET # BLD AUTO: 198 K/UL (ref 150–400)
PMV BLD AUTO: 11.4 FL (ref 8.9–12.9)
POTASSIUM SERPL-SCNC: 3.8 MMOL/L (ref 3.5–5.1)
PROT SERPL-MCNC: 7.2 G/DL (ref 6.4–8.2)
RBC # BLD AUTO: 4.34 M/UL (ref 4.1–5.7)
SODIUM SERPL-SCNC: 140 MMOL/L (ref 136–145)
SPECIMEN HOLD: NORMAL
URATE SERPL-MCNC: 5.6 MG/DL (ref 3.5–7.2)
WBC # BLD AUTO: 5.6 K/UL (ref 4.1–11.1)

## 2023-09-12 PROCEDURE — 6360000002 HC RX W HCPCS: Performed by: EMERGENCY MEDICINE

## 2023-09-12 PROCEDURE — 36415 COLL VENOUS BLD VENIPUNCTURE: CPT

## 2023-09-12 PROCEDURE — 80053 COMPREHEN METABOLIC PANEL: CPT

## 2023-09-12 PROCEDURE — 99284 EMERGENCY DEPT VISIT MOD MDM: CPT

## 2023-09-12 PROCEDURE — 83880 ASSAY OF NATRIURETIC PEPTIDE: CPT

## 2023-09-12 PROCEDURE — 84550 ASSAY OF BLOOD/URIC ACID: CPT

## 2023-09-12 PROCEDURE — 85025 COMPLETE CBC W/AUTO DIFF WBC: CPT

## 2023-09-12 PROCEDURE — 96374 THER/PROPH/DIAG INJ IV PUSH: CPT

## 2023-09-12 PROCEDURE — 96375 TX/PRO/DX INJ NEW DRUG ADDON: CPT

## 2023-09-12 PROCEDURE — 6370000000 HC RX 637 (ALT 250 FOR IP): Performed by: EMERGENCY MEDICINE

## 2023-09-12 RX ORDER — PREDNISONE 20 MG/1
60 TABLET ORAL DAILY
Status: DISCONTINUED | OUTPATIENT
Start: 2023-09-12 | End: 2023-09-12 | Stop reason: HOSPADM

## 2023-09-12 RX ORDER — MORPHINE SULFATE 10 MG/ML
8 INJECTION, SOLUTION INTRAMUSCULAR; INTRAVENOUS
Status: COMPLETED | OUTPATIENT
Start: 2023-09-12 | End: 2023-09-12

## 2023-09-12 RX ORDER — PREDNISONE 20 MG/1
20 TABLET ORAL DAILY
Qty: 5 TABLET | Refills: 0 | Status: SHIPPED | OUTPATIENT
Start: 2023-09-13 | End: 2023-09-18

## 2023-09-12 RX ORDER — IPRATROPIUM BROMIDE AND ALBUTEROL SULFATE 2.5; .5 MG/3ML; MG/3ML
1 SOLUTION RESPIRATORY (INHALATION)
Status: DISCONTINUED | OUTPATIENT
Start: 2023-09-12 | End: 2023-09-12 | Stop reason: HOSPADM

## 2023-09-12 RX ORDER — ALLOPURINOL 100 MG/1
100 TABLET ORAL DAILY
Qty: 30 TABLET | Refills: 0 | Status: SHIPPED | OUTPATIENT
Start: 2023-09-12

## 2023-09-12 RX ORDER — COLCHICINE 0.6 MG/1
0.6 TABLET ORAL DAILY
Qty: 14 TABLET | Refills: 0 | Status: SHIPPED | OUTPATIENT
Start: 2023-09-12 | End: 2023-09-26

## 2023-09-12 RX ORDER — ONDANSETRON 2 MG/ML
8 INJECTION INTRAMUSCULAR; INTRAVENOUS ONCE
Status: COMPLETED | OUTPATIENT
Start: 2023-09-12 | End: 2023-09-12

## 2023-09-12 RX ADMIN — MORPHINE SULFATE 8 MG: 10 INJECTION INTRAVENOUS at 07:29

## 2023-09-12 RX ADMIN — ONDANSETRON 8 MG: 2 INJECTION INTRAMUSCULAR; INTRAVENOUS at 07:28

## 2023-09-12 RX ADMIN — PREDNISONE 60 MG: 20 TABLET ORAL at 08:42

## 2023-09-12 ASSESSMENT — PAIN - FUNCTIONAL ASSESSMENT: PAIN_FUNCTIONAL_ASSESSMENT: 0-10

## 2023-09-12 ASSESSMENT — PAIN SCALES - GENERAL
PAINLEVEL_OUTOF10: 5
PAINLEVEL_OUTOF10: 10
PAINLEVEL_OUTOF10: 10

## 2023-09-12 ASSESSMENT — ENCOUNTER SYMPTOMS
NAUSEA: 0
SHORTNESS OF BREATH: 0
COLOR CHANGE: 0
ABDOMINAL PAIN: 0
COUGH: 1
VOMITING: 0

## 2023-09-12 ASSESSMENT — LIFESTYLE VARIABLES
HOW MANY STANDARD DRINKS CONTAINING ALCOHOL DO YOU HAVE ON A TYPICAL DAY: 5 OR 6
HOW OFTEN DO YOU HAVE A DRINK CONTAINING ALCOHOL: 4 OR MORE TIMES A WEEK

## 2023-09-12 NOTE — ED PROVIDER NOTES
known as: LIPITOR     chlorthalidone 25 MG tablet  Commonly known as: HYGROTON     levocetirizine 5 MG tablet  Commonly known as: XYZAL  Take 1 tablet by mouth nightly     lisinopril 10 MG tablet  Commonly known as: PRINIVIL;ZESTRIL               Where to Get Your Medications        These medications were sent to [address] [place] [address], [address] [place] [address],[address] [facility] [phone] - F [phone]  [address], [address] [place] [address]      Phone: [phone]   allopurinol 100 MG tablet  colchicine 0.6 MG tablet  predniSONE 20 MG tablet           DISCONTINUED MEDICATIONS:  Discharge Medication List as of 9/12/2023  8:36 AM        STOP taking these medications       methylPREDNISolone (MEDROL, [place],) 4 MG tablet Comments:   Reason for Stopping:         indomethacin (INDOCIN) 25 MG capsule Comments:   Reason for Stopping:         naproxen (NAPROSYN) 500 MG tablet Comments:   Reason for Stopping:         predniSONE 10 MG (21) TBPK Comments:   Reason for Stopping:               I am the Primary Clinician of Record. Chacorta Stone MD (electronically signed)      (Please note that parts of this dictation were completed with voice recognition software. Quite often unanticipated grammatical, syntax, homophones, and other interpretive errors are inadvertently transcribed by the computer software. Please disregards these errors. Please excuse any errors that have escaped final proofreading.)          Clifford Rodriguez.  MD Concha  09/13/23 5432

## 2023-11-16 ENCOUNTER — CLINICAL DOCUMENTATION (OUTPATIENT)
Age: 53
End: 2023-11-16

## 2024-03-04 NOTE — PROGRESS NOTES
Returned call to  [employer] . Patient concerned about Hypertension and lack of medication. Suggested the following sites to try to be seen: [facility] for the upcoming weeks. And then [facility] and [facility] for the future. [employer] stated that he was familiar with the locations and would arrive early to be seen. Vicente Henry LPN 15